# Patient Record
Sex: FEMALE | Race: WHITE | NOT HISPANIC OR LATINO | Employment: FULL TIME | URBAN - METROPOLITAN AREA
[De-identification: names, ages, dates, MRNs, and addresses within clinical notes are randomized per-mention and may not be internally consistent; named-entity substitution may affect disease eponyms.]

---

## 2018-11-15 ENCOUNTER — OFFICE VISIT (OUTPATIENT)
Dept: FAMILY MEDICINE CLINIC | Facility: CLINIC | Age: 58
End: 2018-11-15

## 2018-11-15 VITALS
SYSTOLIC BLOOD PRESSURE: 112 MMHG | WEIGHT: 176.4 LBS | BODY MASS INDEX: 32.46 KG/M2 | HEIGHT: 62 IN | TEMPERATURE: 97.8 F | OXYGEN SATURATION: 98 % | DIASTOLIC BLOOD PRESSURE: 78 MMHG | HEART RATE: 88 BPM

## 2018-11-15 DIAGNOSIS — H04.123 DRY EYES: ICD-10-CM

## 2018-11-15 DIAGNOSIS — K22.2 ESOPHAGEAL STRICTURE: ICD-10-CM

## 2018-11-15 DIAGNOSIS — K13.21 LEUKOPLAKIA OF TONGUE: Primary | ICD-10-CM

## 2018-11-15 DIAGNOSIS — R53.82 CHRONIC FATIGUE: ICD-10-CM

## 2018-11-15 DIAGNOSIS — R68.2 MOUTH DRYNESS: ICD-10-CM

## 2018-11-15 DIAGNOSIS — M25.50 ARTHRALGIA, UNSPECIFIED JOINT: ICD-10-CM

## 2018-11-15 DIAGNOSIS — H91.93 BILATERAL HEARING LOSS, UNSPECIFIED HEARING LOSS TYPE: ICD-10-CM

## 2018-11-15 DIAGNOSIS — H53.9 CHANGES IN VISION: ICD-10-CM
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
## 2018-11-15 DIAGNOSIS — R51.9 FREQUENT HEADACHES: ICD-10-CM

## 2018-11-15 DIAGNOSIS — M79.10 MYALGIA: ICD-10-CM

## 2018-11-15 DIAGNOSIS — R07.9 CHEST PAIN, UNSPECIFIED TYPE: ICD-10-CM

## 2018-11-15 PROCEDURE — 99204 OFFICE O/P NEW MOD 45 MIN: CPT | Performed by: NURSE PRACTITIONER

## 2018-11-15 NOTE — PROGRESS NOTES
Assessment/Plan:    No problem-specific Assessment & Plan notes found for this encounter  Diagnoses and all orders for this visit:    Leukoplakia of tongue  Comments:  DW patient that I am concerned with presence over a year need to see an ENT referral given   Orders:  -     Ambulatory Referral to Otolaryngology; Future    Bilateral hearing loss, unspecified hearing loss type  Comments:  ENT referral placed  Orders:  -     Ambulatory Referral to Otolaryngology; Future    Chronic fatigue  -     Lyme Antibody Profile with reflex to WB; Future  -     CBC and differential; Future  -     Comprehensive metabolic panel; Future  -     Lipid Panel with Direct LDL reflex; Future  -     TSH, 3rd generation with Free T4 reflex; Future    Myalgia  -     Lyme Antibody Profile with reflex to WB; Future  -     CBC and differential; Future  -     Comprehensive metabolic panel; Future  -     Lipid Panel with Direct LDL reflex; Future  -     TSH, 3rd generation with Free T4 reflex; Future    Arthralgia, unspecified joint  -     Lyme Antibody Profile with reflex to WB; Future  -     CBC and differential; Future  -     Comprehensive metabolic panel; Future  -     Lipid Panel with Direct LDL reflex; Future  -     TSH, 3rd generation with Free T4 reflex; Future    Frequent headaches  Comments:  prior neurology W/U 2 years ago for similar was told she had atypical migraines will eventually need neurology  Orders:  -     Lyme Antibody Profile with reflex to WB; Future  -     CBC and differential; Future  -     Comprehensive metabolic panel; Future  -     Lipid Panel with Direct LDL reflex; Future  -     TSH, 3rd generation with Free T4 reflex; Future    Changes in vision  -     Lyme Antibody Profile with reflex to WB; Future  -     CBC and differential; Future  -     Comprehensive metabolic panel; Future  -     Lipid Panel with Direct LDL reflex; Future  -     TSH, 3rd generation with Free T4 reflex;  Future    Esophageal stricture  Comments:  eventually will need to see GI for evaluation and possible EGD  Orders:  -     Lyme Antibody Profile with reflex to WB; Future  -     CBC and differential; Future  -     Comprehensive metabolic panel; Future  -     Lipid Panel with Direct LDL reflex; Future  -     TSH, 3rd generation with Free T4 reflex; Future    Dry eyes  -     Lyme Antibody Profile with reflex to WB; Future  -     CBC and differential; Future  -     Comprehensive metabolic panel; Future  -     Lipid Panel with Direct LDL reflex; Future  -     TSH, 3rd generation with Free T4 reflex; Future    Mouth dryness  Comments:  appearing to be possible autoimmune condition such as sjgrens   Orders:  -     Lyme Antibody Profile with reflex to WB; Future  -     CBC and differential; Future  -     Comprehensive metabolic panel; Future  -     Lipid Panel with Direct LDL reflex; Future  -     TSH, 3rd generation with Free T4 reflex; Future    Tick bite, initial encounter  Comments:  labs ordered  Orders:  -     Lyme Antibody Profile with reflex to WB; Future    Chest pain, unspecified type  Comments:  DW patient if symptoms return ED precautions           Subjective:      Patient ID: Chio Agustin is a 62 y o  female  Patient here today to establish care  Patient rpeorts that she is almost never sick  Patient reports that she feels that she has some autoimmune condition possible  Patient had seen rheumatology about 20 years ago and was told she may have had fibromyalgia and told she had rheumatoid arthritis and was not ever on any medications for this  Recently she is having multiple symptoms and feeling that all of her symptoms are on the right side of her body   Symptoms started about 3 months ago with an "ear Infection" on the right side of her head having a pain in the right ear and went to a walk in center and was told to take sudafed and was having ear blocked up and had diminished hearing and hearing is decreased and now has started on the other side as well  Patient then started with having right inside cheek mouth ulcers and something on her tongue and was told that her tongue was feeling too big and rubbing on things and having pain on the right side of her face  Patient is feeling a pain and swelling on the right side of her face  Patient at one point woke up with boils on her face and woke up at times with a rash red comes and goes right foot red rash and itchying  Pins and needles feeling on the right upper thigh comes and goes pins and needles an does have a history of Lumbar disc disease and joints are hurting elbows wrists hip right knees and attributable to weight per patient  Finger joint hurting at times and goes away  Patient also reports that she has a history of migraines her entire life and come and goes  Had a history of a tick bite several months ago present for two days  Patient reports that last week she was at work and reports that everything "went black" could not see anything but 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 and then vision came back slowly after about 30 seconds  Patient last eye exam was six months ago  Following that episode she felt like she couldn't talk and words were garbled  Patient had a neurology at [facility] and had a w/u in the past and had an MRI on the brain and CT scan and was done eight months after the prior episode which was 2 years ago  Patient has a constant headache and a pain in the left side of the head  Patient takes Excedrin migraine and does help  Father 80 with diabetes   Mom 80 with dementia aneurysm in brain and cardiomyopathy  The following portions of the patient's history were reviewed and updated as appropriate:   She  has no past medical history on file    She   Patient Active Problem List    Diagnosis Date Noted    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    Bilateral hearing loss 11/15/2018    Chronic fatigue 11/15/2018    Myalgia 11/15/2018    Arthralgia 11/15/2018  Changes in vision 11/15/2018    Frequent headaches 11/15/2018    Esophageal stricture 11/15/2018    Dry eyes 11/15/2018    Mouth dryness 11/15/2018    Tick bite 11/15/2018    Chest pain 11/15/2018     She  has no past surgical history on file  Her family history is not on file  She  reports that she has never smoked  She has never used smokeless tobacco  Her alcohol and drug histories are not on file  She is allergic to albuterol and penicillins       Review of Systems   Constitutional: Positive for fatigue  Negative for activity change, appetite change, chills, diaphoresis, fever and unexpected weight change  HENT: Positive for ear pain, hearing loss, mouth sores, tinnitus, trouble swallowing and voice change  Negative for congestion, dental problem, drooling, nosebleeds, postnasal drip, rhinorrhea, sinus pain, sinus pressure, sneezing and sore throat  Very dry mouth    Eyes: Negative for visual disturbance  Dry eyes   Respiratory: Positive for cough  Cardiovascular: Positive for chest pain  Had an episode of crushing chest pain last week lasting a few minutes not associated with eating a meal    Gastrointestinal: Negative for abdominal distention, abdominal pain, anal bleeding, blood in stool, constipation, diarrhea, nausea and vomiting  Esophageal stricture history with prior dilation r/t her GERD history now having problems with foods getting stuck in her throat    Endocrine: Positive for polydipsia, polyphagia and polyuria  Genitourinary:        Vaginal yeast infection had hysterectomy 11 years ago done for cevical precancer cells Patient has mammograms yearly last one 3 years ago have been ok   Musculoskeletal: Positive for arthralgias, back pain, joint swelling and myalgias  Skin: Positive for rash  Neurological: Positive for dizziness, numbness and headaches   Negative for tremors, seizures, syncope, facial asymmetry, speech difficulty, weakness and light-headedness  Right side of face and right leg at times goes numb    Hematological: Negative for adenopathy  Bruises/bleeds easily  Psychiatric/Behavioral: Positive for sleep disturbance  Falling asleep ok waking up at times and can't fall back to sleep happening more than not  Patient does snore no prior sleep study  Objective:      /78 (Cuff Size: Standard)   Pulse 88   Temp 97 8 °F (36 6 °C) (Tympanic)   Ht 5' 2" (1 575 m)   Wt 80 kg (176 lb 6 4 oz)   SpO2 98%   BMI 32 26 kg/m²          Physical Exam   Constitutional: She is oriented to person, place, and time  Vital signs are normal  She appears well-developed and well-nourished  She is cooperative  No distress  HENT:   Head: Normocephalic  Right Ear: Tympanic membrane, external ear and ear canal normal  Decreased hearing is noted  Left Ear: Tympanic membrane, external ear and ear canal normal  Decreased hearing is noted  Mouth/Throat: Uvula is midline, oropharynx is clear and moist and mucous membranes are normal  Oral lesions present  Neck: Trachea normal and normal range of motion  Normal carotid pulses present  Spinous process tenderness and muscular tenderness present  Carotid bruit is not present  No thyroid mass and no thyromegaly present  Cardiovascular: Normal rate, regular rhythm, S1 normal, S2 normal and normal heart sounds  Pulmonary/Chest: Effort normal and breath sounds normal    Abdominal: Soft  Normal appearance and bowel sounds are normal  She exhibits distension  Musculoskeletal:        Cervical back: She exhibits tenderness and spasm  Lymphadenopathy:     She has cervical adenopathy  Right cervical: Superficial cervical adenopathy present  Neurological: She is alert and oriented to person, place, and time  She has normal strength  GCS eye subscore is 4  GCS verbal subscore is 5  GCS motor subscore is 6  Skin: Skin is warm and dry  She is not diaphoretic  Psychiatric: She has a normal mood and affect  Her speech is normal and behavior is normal  Judgment and thought content normal  Cognition and memory are normal    Nursing note and vitals reviewed

## 2018-11-24 ENCOUNTER — APPOINTMENT (OUTPATIENT)
Dept: LAB | Facility: CLINIC | Age: 58
End: 2018-11-24
Payer: COMMERCIAL

## 2018-11-24 DIAGNOSIS — R51.9 FREQUENT HEADACHES: ICD-10-CM

## 2018-11-24 DIAGNOSIS — M25.50 ARTHRALGIA, UNSPECIFIED JOINT: ICD-10-CM

## 2018-11-24 DIAGNOSIS — R53.82 CHRONIC FATIGUE: ICD-10-CM

## 2018-11-24 DIAGNOSIS — R68.2 MOUTH DRYNESS: ICD-10-CM

## 2018-11-24 DIAGNOSIS — K22.2 ESOPHAGEAL STRICTURE: ICD-10-CM
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
## 2018-11-24 DIAGNOSIS — H53.9 CHANGES IN VISION: ICD-10-CM

## 2018-11-24 DIAGNOSIS — M79.10 MYALGIA: ICD-10-CM

## 2018-11-24 DIAGNOSIS — H04.123 DRY EYES: ICD-10-CM

## 2018-11-24 LAB
ALBUMIN SERPL BCP-MCNC: 3.5 G/DL (ref 3.5–5)
ALP SERPL-CCNC: 88 U/L (ref 46–116)
ALT SERPL W P-5'-P-CCNC: 31 U/L (ref 12–78)
ANION GAP SERPL CALCULATED.3IONS-SCNC: 5 MMOL/L (ref 4–13)
AST SERPL W P-5'-P-CCNC: 23 U/L (ref 5–45)
BASOPHILS # BLD AUTO: 0.03 THOUSANDS/ΜL (ref 0–0.1)
BASOPHILS NFR BLD AUTO: 1 % (ref 0–1)
BILIRUB SERPL-MCNC: 0.31 MG/DL (ref 0.2–1)
BUN SERPL-MCNC: 18 MG/DL (ref 5–25)
CALCIUM SERPL-MCNC: 9.7 MG/DL (ref 8.3–10.1)
CHLORIDE SERPL-SCNC: 106 MMOL/L (ref 100–108)
CHOLEST SERPL-MCNC: 238 MG/DL (ref 50–200)
CO2 SERPL-SCNC: 26 MMOL/L (ref 21–32)
CREAT SERPL-MCNC: 0.85 MG/DL (ref 0.6–1.3)
EOSINOPHIL # BLD AUTO: 0.14 THOUSAND/ΜL (ref 0–0.61)
EOSINOPHIL NFR BLD AUTO: 2 % (ref 0–6)
ERYTHROCYTE [DISTWIDTH] IN BLOOD BY AUTOMATED COUNT: 14.6 % (ref 11.6–15.1)
GFR SERPL CREATININE-BSD FRML MDRD: 76 ML/MIN/1.73SQ M
GLUCOSE P FAST SERPL-MCNC: 100 MG/DL (ref 65–99)
HCT VFR BLD AUTO: 43.5 % (ref 34.8–46.1)
HDLC SERPL-MCNC: 56 MG/DL (ref 40–60)
HGB BLD-MCNC: 13.9 G/DL (ref 11.5–15.4)
IMM GRANULOCYTES # BLD AUTO: 0.01 THOUSAND/UL (ref 0–0.2)
IMM GRANULOCYTES NFR BLD AUTO: 0 % (ref 0–2)
LDLC SERPL CALC-MCNC: 157 MG/DL (ref 0–100)
LYMPHOCYTES # BLD AUTO: 1.99 THOUSANDS/ΜL (ref 0.6–4.47)
LYMPHOCYTES NFR BLD AUTO: 30 % (ref 14–44)
MCH RBC QN AUTO: 28.7 PG (ref 26.8–34.3)
MCHC RBC AUTO-ENTMCNC: 32 G/DL (ref 31.4–37.4)
MCV RBC AUTO: 90 FL (ref 82–98)
MONOCYTES # BLD AUTO: 0.43 THOUSAND/ΜL (ref 0.17–1.22)
MONOCYTES NFR BLD AUTO: 7 % (ref 4–12)
NEUTROPHILS # BLD AUTO: 4.03 THOUSANDS/ΜL (ref 1.85–7.62)
NEUTS SEG NFR BLD AUTO: 60 % (ref 43–75)
NRBC BLD AUTO-RTO: 0 /100 WBCS
PLATELET # BLD AUTO: 312 THOUSANDS/UL (ref 149–390)
PMV BLD AUTO: 10.5 FL (ref 8.9–12.7)
POTASSIUM SERPL-SCNC: 4.1 MMOL/L (ref 3.5–5.3)
PROT SERPL-MCNC: 7.3 G/DL (ref 6.4–8.2)
RBC # BLD AUTO: 4.85 MILLION/UL (ref 3.81–5.12)
SODIUM SERPL-SCNC: 137 MMOL/L (ref 136–145)
TRIGL SERPL-MCNC: 127 MG/DL
TSH SERPL DL<=0.05 MIU/L-ACNC: 2.17 UIU/ML (ref 0.36–3.74)
WBC # BLD AUTO: 6.63 THOUSAND/UL (ref 4.31–10.16)

## 2018-11-24 PROCEDURE — 85025 COMPLETE CBC W/AUTO DIFF WBC: CPT

## 2018-11-24 PROCEDURE — 86618 LYME DISEASE ANTIBODY: CPT

## 2018-11-24 PROCEDURE — 80061 LIPID PANEL: CPT

## 2018-11-24 PROCEDURE — 80053 COMPREHEN METABOLIC PANEL: CPT

## 2018-11-24 PROCEDURE — 36415 COLL VENOUS BLD VENIPUNCTURE: CPT

## 2018-11-24 PROCEDURE — 84443 ASSAY THYROID STIM HORMONE: CPT

## 2018-11-26 LAB
B BURGDOR IGG SER IA-ACNC: 0.32
B BURGDOR IGM SER IA-ACNC: 0.4

## 2018-11-29 ENCOUNTER — LAB REQUISITION (OUTPATIENT)
Dept: LAB | Facility: HOSPITAL | Age: 58
End: 2018-11-29

## 2018-11-29 DIAGNOSIS — D37.02 NEOPLASM OF UNCERTAIN BEHAVIOR OF TONGUE: ICD-10-CM

## 2018-11-29 PROCEDURE — 88309 TISSUE EXAM BY PATHOLOGIST: CPT | Performed by: PATHOLOGY

## 2018-11-30 DIAGNOSIS — M25.50 ARTHRALGIA, UNSPECIFIED JOINT: ICD-10-CM

## 2018-11-30 DIAGNOSIS — R53.82 CHRONIC FATIGUE: Primary | ICD-10-CM

## 2018-11-30 DIAGNOSIS — M79.10 MYALGIA: ICD-10-CM

## 2018-12-04 ENCOUNTER — TELEPHONE (OUTPATIENT)
Dept: NEUROLOGY | Facility: CLINIC | Age: 58
End: 2018-12-04

## 2018-12-05 ENCOUNTER — TRANSCRIBE ORDERS (OUTPATIENT)
Dept: ADMINISTRATIVE | Facility: HOSPITAL | Age: 58
End: 2018-12-05

## 2018-12-05 DIAGNOSIS — C02.2 MALIGNANT NEOPLASM OF VENTRAL SURFACE OF TONGUE (HCC): Primary | ICD-10-CM

## 2018-12-13 ENCOUNTER — HOSPITAL ENCOUNTER (OUTPATIENT)
Dept: RADIOLOGY | Age: 58
Discharge: HOME | End: 2018-12-13
Payer: COMMERCIAL

## 2018-12-13 DIAGNOSIS — C02.2 MALIGNANT NEOPLASM OF VENTRAL SURFACE OF TONGUE (HCC): ICD-10-CM

## 2018-12-13 LAB — GLUCOSE SERPL-MCNC: 99 MG/DL (ref 65–140)

## 2018-12-13 PROCEDURE — 82948 REAGENT STRIP/BLOOD GLUCOSE: CPT

## 2018-12-13 PROCEDURE — A9552 F18 FDG: HCPCS

## 2018-12-13 PROCEDURE — 70491 CT SOFT TISSUE NECK W/DYE: CPT

## 2018-12-13 PROCEDURE — 78815 PET IMAGE W/CT SKULL-THIGH: CPT

## 2018-12-13 RX ORDER — LORAZEPAM 2 MG/ML
1 INJECTION INTRAMUSCULAR
Status: COMPLETED | OUTPATIENT
Start: 2018-12-13 | End: 2018-12-13

## 2018-12-13 RX ADMIN — IOHEXOL 85 ML: 350 INJECTION, SOLUTION INTRAVENOUS at 10:25

## 2018-12-13 RX ADMIN — LORAZEPAM 1 MG: 2 INJECTION INTRAMUSCULAR; INTRAVENOUS at 08:05

## 2018-12-22 ENCOUNTER — TRANSCRIBE ORDERS (OUTPATIENT)
Dept: ADMINISTRATIVE | Facility: HOSPITAL | Age: 58
End: 2018-12-22

## 2018-12-22 ENCOUNTER — APPOINTMENT (OUTPATIENT)
Dept: LAB | Facility: CLINIC | Age: 58
End: 2018-12-22
Payer: COMMERCIAL

## 2018-12-22 DIAGNOSIS — C02.2 MALIGNANT NEOPLASM OF VENTRAL SURFACE OF TONGUE (HCC): ICD-10-CM

## 2018-12-22 DIAGNOSIS — D68.8 FAMILIAL MULTIPLE FACTOR DEFICIENCY SYNDROME (HCC): ICD-10-CM

## 2018-12-22 DIAGNOSIS — D68.8 FAMILIAL MULTIPLE FACTOR DEFICIENCY SYNDROME (HCC): Primary | ICD-10-CM

## 2018-12-22 LAB
ANION GAP SERPL CALCULATED.3IONS-SCNC: 6 MMOL/L (ref 4–13)
APTT PPP: 32 SECONDS (ref 26–38)
BASOPHILS # BLD AUTO: 0.03 THOUSANDS/ΜL (ref 0–0.1)
BASOPHILS NFR BLD AUTO: 1 % (ref 0–1)
BUN SERPL-MCNC: 14 MG/DL (ref 5–25)
CALCIUM SERPL-MCNC: 9.1 MG/DL (ref 8.3–10.1)
CHLORIDE SERPL-SCNC: 108 MMOL/L (ref 100–108)
CO2 SERPL-SCNC: 27 MMOL/L (ref 21–32)
CREAT SERPL-MCNC: 0.82 MG/DL (ref 0.6–1.3)
EOSINOPHIL # BLD AUTO: 0.1 THOUSAND/ΜL (ref 0–0.61)
EOSINOPHIL NFR BLD AUTO: 2 % (ref 0–6)
ERYTHROCYTE [DISTWIDTH] IN BLOOD BY AUTOMATED COUNT: 14.5 % (ref 11.6–15.1)
GFR SERPL CREATININE-BSD FRML MDRD: 79 ML/MIN/1.73SQ M
GLUCOSE P FAST SERPL-MCNC: 103 MG/DL (ref 65–99)
HCT VFR BLD AUTO: 44 % (ref 34.8–46.1)
HGB BLD-MCNC: 14.4 G/DL (ref 11.5–15.4)
IMM GRANULOCYTES # BLD AUTO: 0 THOUSAND/UL (ref 0–0.2)
IMM GRANULOCYTES NFR BLD AUTO: 0 % (ref 0–2)
INR PPP: 0.95 (ref 0.86–1.17)
LYMPHOCYTES # BLD AUTO: 1.65 THOUSANDS/ΜL (ref 0.6–4.47)
LYMPHOCYTES NFR BLD AUTO: 35 % (ref 14–44)
MCH RBC QN AUTO: 29 PG (ref 26.8–34.3)
MCHC RBC AUTO-ENTMCNC: 32.7 G/DL (ref 31.4–37.4)
MCV RBC AUTO: 89 FL (ref 82–98)
MONOCYTES # BLD AUTO: 0.35 THOUSAND/ΜL (ref 0.17–1.22)
MONOCYTES NFR BLD AUTO: 7 % (ref 4–12)
NEUTROPHILS # BLD AUTO: 2.65 THOUSANDS/ΜL (ref 1.85–7.62)
NEUTS SEG NFR BLD AUTO: 55 % (ref 43–75)
NRBC BLD AUTO-RTO: 0 /100 WBCS
PLATELET # BLD AUTO: 278 THOUSANDS/UL (ref 149–390)
PMV BLD AUTO: 11.3 FL (ref 8.9–12.7)
POTASSIUM SERPL-SCNC: 4.1 MMOL/L (ref 3.5–5.3)
PROTHROMBIN TIME: 12.8 SECONDS (ref 11.8–14.2)
RBC # BLD AUTO: 4.97 MILLION/UL (ref 3.81–5.12)
SODIUM SERPL-SCNC: 141 MMOL/L (ref 136–145)
WBC # BLD AUTO: 4.78 THOUSAND/UL (ref 4.31–10.16)

## 2018-12-22 PROCEDURE — 80048 BASIC METABOLIC PNL TOTAL CA: CPT

## 2018-12-22 PROCEDURE — 36415 COLL VENOUS BLD VENIPUNCTURE: CPT

## 2018-12-22 PROCEDURE — 85025 COMPLETE CBC W/AUTO DIFF WBC: CPT

## 2018-12-22 PROCEDURE — 85610 PROTHROMBIN TIME: CPT

## 2018-12-22 PROCEDURE — 85730 THROMBOPLASTIN TIME PARTIAL: CPT

## 2018-12-27 ENCOUNTER — OFFICE VISIT (OUTPATIENT)
Dept: FAMILY MEDICINE CLINIC | Facility: CLINIC | Age: 58
End: 2018-12-27

## 2018-12-27 VITALS
HEART RATE: 74 BPM | DIASTOLIC BLOOD PRESSURE: 76 MMHG | BODY MASS INDEX: 30.99 KG/M2 | TEMPERATURE: 97.5 F | OXYGEN SATURATION: 99 % | HEIGHT: 62 IN | WEIGHT: 168.4 LBS | SYSTOLIC BLOOD PRESSURE: 120 MMHG

## 2018-12-27 DIAGNOSIS — D00.07 SQUAMOUS CELL CARCINOMA IN SITU OF LATERAL PORTION OF TONGUE: ICD-10-CM

## 2018-12-27 DIAGNOSIS — Z01.818 PRE-OP EXAMINATION: Primary | ICD-10-CM

## 2018-12-27 PROCEDURE — 93000 ELECTROCARDIOGRAM COMPLETE: CPT | Performed by: FAMILY MEDICINE

## 2018-12-27 PROCEDURE — 99213 OFFICE O/P EST LOW 20 MIN: CPT | Performed by: FAMILY MEDICINE

## 2018-12-27 NOTE — PROGRESS NOTES
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  She is cleared for surgery  Follow up in 1 month for a wellness exam        Subjective:      Patient ID: Sheridan Odell is a 62 y o  female  Patient is here for a Pre-op clearance of removal of squamous cell carcinoma cancer of the right lateral portion of the tongue  The surgery is to be done on January 9th by Dr Juanjo Brewster at [address] [place] [address]  She was never a smoker never chewed tobacco          The following portions of the patient's history were reviewed and updated as appropriate:   She  has no past medical history on file  She   Patient Active Problem List    Diagnosis Date Noted    Pre-op examination 12/27/2018    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 12/27/2018    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    Bilateral hearing loss 11/15/2018    Chronic fatigue 11/15/2018    Myalgia 11/15/2018    Arthralgia 11/15/2018    Changes in vision 11/15/2018    Frequent headaches 11/15/2018    Esophageal stricture 11/15/2018    Dry eyes 11/15/2018    Mouth dryness 11/15/2018    Tick bite 11/15/2018    Chest pain 11/15/2018     She  has no past surgical history on file  Her family history is not on file  She  reports that she has never smoked  She has never used smokeless tobacco  Her alcohol and drug histories are not on file  No current outpatient prescriptions on file  No current facility-administered medications for this visit  No current outpatient prescriptions on file prior to visit  No current facility-administered medications on file prior to visit  She is allergic to bee venom; albuterol; and penicillins       Review of Systems   Constitutional: Negative for activity change, appetite change, fatigue and fever  HENT: Negative for congestion and ear discharge  Respiratory: Negative for cough and shortness of breath  Cardiovascular: Negative for chest pain and palpitations  Gastrointestinal: Negative for diarrhea and nausea  Musculoskeletal: Negative for arthralgias and back pain  Skin: Positive for color change and wound  Negative for rash  Neurological: Negative for dizziness and headaches  Psychiatric/Behavioral: Negative for agitation and behavioral problems  Objective:      /76   Pulse 74   Temp 97 5 °F (36 4 °C)   Ht 5' 2" (1 575 m)   Wt 76 4 kg (168 lb 6 4 oz)   SpO2 99%   BMI 30 80 kg/m²          Physical Exam   Constitutional: She is oriented to person, place, and time  She appears well-developed and well-nourished  No distress  HENT:   Head: Normocephalic and atraumatic  Nose: Nose normal    Mouth/Throat: Oropharynx is clear and moist    A well circumscribed white plaque noted on the right side of her tongue  Eyes: Pupils are equal, round, and reactive to light  Conjunctivae are normal    Cardiovascular: Normal rate, regular rhythm and normal heart sounds  No murmur heard  Pulmonary/Chest: Effort normal and breath sounds normal  No respiratory distress  She has no wheezes  Abdominal: Soft  Bowel sounds are normal  She exhibits no distension  There is no tenderness  Neurological: She is alert and oriented to person, place, and time  Skin: Skin is warm and dry  No rash noted  She is not diaphoretic  No erythema  Psychiatric: She has a normal mood and affect

## 2018-12-31 NOTE — PRE-PROCEDURE INSTRUCTIONS
No outpatient prescriptions have been marked as taking for the 1/9/19 encounter [facility] Encounter)  Instructed has no medications to be taken morning of surgery  Stop NSAIDs, aspirin, vitamins, and supplements 1 week before surgery  Instructed re chlorhexidine showers per hospital policy

## 2019-01-08 ENCOUNTER — ANESTHESIA EVENT (OUTPATIENT)
Dept: PERIOP | Facility: HOSPITAL | Age: 59
End: 2019-01-08

## 2019-01-09 ENCOUNTER — ANESTHESIA (OUTPATIENT)
Dept: PERIOP | Facility: HOSPITAL | Age: 59
End: 2019-01-09

## 2019-01-09 ENCOUNTER — HOSPITAL ENCOUNTER (OUTPATIENT)
Facility: HOSPITAL | Age: 59
Setting detail: OUTPATIENT SURGERY
Discharge: HOME | End: 2019-01-09
Attending: OTOLARYNGOLOGY | Admitting: OTOLARYNGOLOGY

## 2019-01-09 VITALS
TEMPERATURE: 97.5 F | BODY MASS INDEX: 30.91 KG/M2 | WEIGHT: 168 LBS | HEIGHT: 62 IN | DIASTOLIC BLOOD PRESSURE: 70 MMHG | HEART RATE: 67 BPM | RESPIRATION RATE: 16 BRPM | OXYGEN SATURATION: 98 % | SYSTOLIC BLOOD PRESSURE: 133 MMHG

## 2019-01-09 DIAGNOSIS — C02.2 MALIGNANT NEOPLASM OF VENTRAL SURFACE OF TONGUE (HCC): ICD-10-CM

## 2019-01-09 PROCEDURE — 88309 TISSUE EXAM BY PATHOLOGIST: CPT | Performed by: PATHOLOGY

## 2019-01-09 PROCEDURE — 88331 PATH CONSLTJ SURG 1 BLK 1SPC: CPT | Performed by: PATHOLOGY

## 2019-01-09 RX ORDER — DEXAMETHASONE SODIUM PHOSPHATE 4 MG/ML
INJECTION, SOLUTION INTRA-ARTICULAR; INTRALESIONAL; INTRAMUSCULAR; INTRAVENOUS; SOFT TISSUE AS NEEDED
Status: DISCONTINUED | OUTPATIENT
Start: 2019-01-09 | End: 2019-01-09 | Stop reason: SURG

## 2019-01-09 RX ORDER — ONDANSETRON 2 MG/ML
4 INJECTION INTRAMUSCULAR; INTRAVENOUS ONCE AS NEEDED
Status: DISCONTINUED | OUTPATIENT
Start: 2019-01-09 | End: 2019-01-09 | Stop reason: HOSPADM

## 2019-01-09 RX ORDER — ACETAMINOPHEN 325 MG/1
650 TABLET ORAL EVERY 4 HOURS PRN
Status: DISCONTINUED | OUTPATIENT
Start: 2019-01-09 | End: 2019-01-09 | Stop reason: HOSPADM

## 2019-01-09 RX ORDER — MIDAZOLAM HYDROCHLORIDE 1 MG/ML
INJECTION INTRAMUSCULAR; INTRAVENOUS AS NEEDED
Status: DISCONTINUED | OUTPATIENT
Start: 2019-01-09 | End: 2019-01-09 | Stop reason: SURG

## 2019-01-09 RX ORDER — NEOSTIGMINE METHYLSULFATE 1 MG/ML
INJECTION INTRAVENOUS AS NEEDED
Status: DISCONTINUED | OUTPATIENT
Start: 2019-01-09 | End: 2019-01-09 | Stop reason: SURG

## 2019-01-09 RX ORDER — DEXTROSE MONOHYDRATE AND SODIUM CHLORIDE 5; .45 G/100ML; G/100ML
125 INJECTION, SOLUTION INTRAVENOUS CONTINUOUS
Status: DISCONTINUED | OUTPATIENT
Start: 2019-01-09 | End: 2019-01-09 | Stop reason: HOSPADM

## 2019-01-09 RX ORDER — FENTANYL CITRATE 50 UG/ML
INJECTION, SOLUTION INTRAMUSCULAR; INTRAVENOUS AS NEEDED
Status: DISCONTINUED | OUTPATIENT
Start: 2019-01-09 | End: 2019-01-09 | Stop reason: SURG

## 2019-01-09 RX ORDER — CLINDAMYCIN PHOSPHATE 900 MG/50ML
900 INJECTION INTRAVENOUS ONCE
Status: COMPLETED | OUTPATIENT
Start: 2019-01-09 | End: 2019-01-09

## 2019-01-09 RX ORDER — LIDOCAINE HYDROCHLORIDE AND EPINEPHRINE 10; 10 MG/ML; UG/ML
INJECTION, SOLUTION INFILTRATION; PERINEURAL AS NEEDED
Status: DISCONTINUED | OUTPATIENT
Start: 2019-01-09 | End: 2019-01-09 | Stop reason: HOSPADM

## 2019-01-09 RX ORDER — SODIUM CHLORIDE 9 MG/ML
125 INJECTION, SOLUTION INTRAVENOUS CONTINUOUS
Status: DISCONTINUED | OUTPATIENT
Start: 2019-01-09 | End: 2019-01-09 | Stop reason: HOSPADM

## 2019-01-09 RX ORDER — FENTANYL CITRATE/PF 50 MCG/ML
50 SYRINGE (ML) INJECTION
Status: DISCONTINUED | OUTPATIENT
Start: 2019-01-09 | End: 2019-01-09 | Stop reason: HOSPADM

## 2019-01-09 RX ORDER — OXYMETAZOLINE HYDROCHLORIDE 0.05 G/100ML
SPRAY NASAL AS NEEDED
Status: DISCONTINUED | OUTPATIENT
Start: 2019-01-09 | End: 2019-01-09 | Stop reason: SURG

## 2019-01-09 RX ORDER — ONDANSETRON 2 MG/ML
INJECTION INTRAMUSCULAR; INTRAVENOUS AS NEEDED
Status: DISCONTINUED | OUTPATIENT
Start: 2019-01-09 | End: 2019-01-09 | Stop reason: SURG

## 2019-01-09 RX ORDER — PROPOFOL 10 MG/ML
INJECTION, EMULSION INTRAVENOUS AS NEEDED
Status: DISCONTINUED | OUTPATIENT
Start: 2019-01-09 | End: 2019-01-09 | Stop reason: SURG

## 2019-01-09 RX ORDER — OXYCODONE HYDROCHLORIDE AND ACETAMINOPHEN 5; 325 MG/1; MG/1
2 TABLET ORAL EVERY 4 HOURS PRN
Status: DISCONTINUED | OUTPATIENT
Start: 2019-01-09 | End: 2019-01-09 | Stop reason: HOSPADM

## 2019-01-09 RX ORDER — ONDANSETRON 2 MG/ML
4 INJECTION INTRAMUSCULAR; INTRAVENOUS EVERY 6 HOURS PRN
Status: DISCONTINUED | OUTPATIENT
Start: 2019-01-09 | End: 2019-01-09 | Stop reason: HOSPADM

## 2019-01-09 RX ORDER — ROCURONIUM BROMIDE 10 MG/ML
INJECTION, SOLUTION INTRAVENOUS AS NEEDED
Status: DISCONTINUED | OUTPATIENT
Start: 2019-01-09 | End: 2019-01-09 | Stop reason: SURG

## 2019-01-09 RX ORDER — MAGNESIUM HYDROXIDE 1200 MG/15ML
LIQUID ORAL AS NEEDED
Status: DISCONTINUED | OUTPATIENT
Start: 2019-01-09 | End: 2019-01-09 | Stop reason: HOSPADM

## 2019-01-09 RX ORDER — GLYCOPYRROLATE 0.2 MG/ML
INJECTION INTRAMUSCULAR; INTRAVENOUS AS NEEDED
Status: DISCONTINUED | OUTPATIENT
Start: 2019-01-09 | End: 2019-01-09 | Stop reason: SURG

## 2019-01-09 RX ADMIN — LIDOCAINE HYDROCHLORIDE 50 MG: 20 INJECTION, SOLUTION INTRAVENOUS at 09:27

## 2019-01-09 RX ADMIN — SODIUM CHLORIDE 125 ML/HR: 0.9 INJECTION, SOLUTION INTRAVENOUS at 07:38

## 2019-01-09 RX ADMIN — OXYMETAZOLINE HYDROCHLORIDE 2 SPRAY: 5 SPRAY NASAL at 09:26

## 2019-01-09 RX ADMIN — GLYCOPYRROLATE 0.4 MG: 0.2 INJECTION, SOLUTION INTRAMUSCULAR; INTRAVENOUS at 10:48

## 2019-01-09 RX ADMIN — ROCURONIUM BROMIDE 35 MG: 10 INJECTION INTRAVENOUS at 09:28

## 2019-01-09 RX ADMIN — NEOSTIGMINE METHYLSULFATE 3 MG: 1 INJECTION INTRAVENOUS at 10:48

## 2019-01-09 RX ADMIN — MIDAZOLAM 2 MG: 1 INJECTION INTRAMUSCULAR; INTRAVENOUS at 09:17

## 2019-01-09 RX ADMIN — SODIUM CHLORIDE: 0.9 INJECTION, SOLUTION INTRAVENOUS at 10:40

## 2019-01-09 RX ADMIN — FENTANYL CITRATE 150 MCG: 50 INJECTION, SOLUTION INTRAMUSCULAR; INTRAVENOUS at 09:27

## 2019-01-09 RX ADMIN — CLINDAMYCIN PHOSPHATE 900 MG: 18 INJECTION, SOLUTION INTRAMUSCULAR; INTRAVENOUS at 09:30

## 2019-01-09 RX ADMIN — ONDANSETRON 4 MG: 2 INJECTION INTRAMUSCULAR; INTRAVENOUS at 10:47

## 2019-01-09 RX ADMIN — DEXAMETHASONE SODIUM PHOSPHATE 12 MG: 4 INJECTION, SOLUTION INTRAMUSCULAR; INTRAVENOUS at 09:40

## 2019-01-09 RX ADMIN — FENTANYL CITRATE 50 MCG: 50 INJECTION, SOLUTION INTRAMUSCULAR; INTRAVENOUS at 11:26

## 2019-01-09 RX ADMIN — PROPOFOL 160 MG: 10 INJECTION, EMULSION INTRAVENOUS at 09:27

## 2019-01-09 NOTE — ANESTHESIA PREPROCEDURE EVALUATION
Review of Systems/Medical History  Patient summary reviewed  Chart reviewed  No history of anesthetic complications     Cardiovascular  Negative cardio ROS    Pulmonary  Negative pulmonary ROS        GI/Hepatic  Negative GI/hepatic ROS          Kidney stones,        Endo/Other  Negative endo/other ROS      GYN  Negative gynecology ROS          Hematology  Negative hematology ROS      Musculoskeletal  Negative musculoskeletal ROS        Neurology    Headaches,    Psychology   Negative psychology ROS              Physical Exam    Airway    Mallampati score: III  TM Distance: >3 FB  Neck ROM: full     Dental   No notable dental hx     Cardiovascular  Comment: Negative ROS, Rhythm: regular, Rate: normal, Cardiovascular exam normal    Pulmonary  Pulmonary exam normal     Other Findings        Anesthesia Plan  ASA Score- 2     Anesthesia Type- general with ASA Monitors  Additional Monitors:   Airway Plan: ETT  Plan Factors-Patient not instructed to abstain from smoking on day of procedure  Patient did not smoke on day of surgery  Induction- intravenous  Postoperative Plan-     Informed Consent- Anesthetic plan and risks discussed with spouse and patient

## 2019-01-09 NOTE — OP NOTE
OPERATIVE REPORT  PATIENT NAME: Jolie Rincon    :  1960  MRN: [id number]  Pt Location: [facility] OR ROOM 04    SURGERY DATE: 2019    Surgeon(s) and Role:     Alysha Mancilla DO - Primary    Preop Diagnosis:  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 ([facility] [address] ) [C02 2]    Post-Op Diagnosis Codes:     * 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 (HCC) [C02 2]    Procedure(s) (LRB):  Partial glossectomy with FROZEN SECTION (N/A)    Specimen(s):  ID Type Source Tests Collected by Time Destination   1 : Ventral Tongue Right Dorsal margin 1200 Tissue Tongue TISSUE EXAM Clinton Thomas DO 2019 0943        Estimated Blood Loss:   Minimal    Drains:       Anesthesia Type:   General    Operative Indications:  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 ([facility] [address] ) [C02 2]      Operative Findings:  As above  Complications:   None    Procedure and Technique:  After the patient was properly identified she was placed on the operating table in the usual supine position  Informed time-out was completed noting the patient's date of birth, her drug allergies as well as the appropriate preop antibiotic which was given within 1 hr start time  The procedure was stated by the operative surgeon confirmed by Nursing and anesthesia with no contraindications to proceeding with surgery  General nasotracheal intubation was performed without difficulty  When deemed adequate patient was prepped and draped in the usual sterile fashion  A mouth gag was placed to keep the mouth open  A total of 4 mL of 1% lidocaine 1 to [id number] epinephrine was injected into the tongue  Small ulcer on the ventral surface of the right lateral tongue was noted  Using a marking pen, at least 1 cm anterior and inferiorly as well as 2 cm posteriorly was marked on the ventral tongue and carried along the dorsal surface of the tongue circumferentially around the lesion    A 15 blade scalpel was used to incise the lesion following markings and then cut through the intrinsic muscles of the tongue deep to the tumor  The specimen was passed off the table and marked anatomically with a 2 0 silk suture marking the 12 o'clock position  The specimen was placed and specimen container with and stapled at 3 6 and 9 o'clock to help pathology with orientation of the specimen  The specimen was sent to pathology and I personally spoke with the pathologist to make sure all the orientation of the specimen was clear  Mild bleeding at the surgical site was controlled with electrocautery  A wet dressing was placed over the wound as we waited for pathologic diagnosis  Pathology confirmed that all margins were free of invasive squamous cell carcinoma  Initially surgical closure was planned with a biological dressing; this was not required I was able to close the wound primarily using 3 0 Vicryl simple interrupted sutures  The wound was inspected and noted to be hemostatic  The oropharynx was suctioned and there was no active bleeding noted within the oral cavity or on the tongue proper  General nasotracheal intubation was discontinued, the nasotracheal tube was removed without difficulty and the patient was returned to the recovery room stable condition     I was present for the entire procedure    Patient Disposition:  PACU     SIGNATURE: Jarrod Powell DO  DATE: January 9, 2019  TIME: 11:05 AM

## 2019-01-09 NOTE — DISCHARGE INSTRUCTIONS
[facility]    POST OPERATIVE EXCISION INSTRUCTIONS        1  Keep area of incision clean and dry  2   You may shower ad kera after surgery  No strenuous activity  3   No smoking  Avoid second hand smoke exposure  4   May eat and drink as tolerated  Avoid spicy, acidic foods  Avoid mouthwash or drinks containing alcohol  Your speech may be slurred during healing process; do not be concerned, speech will return to normal as you heal  Do not be concerned if unable to eat solids or pureed foods but you must drink adequately to avoid dehydration  If unable to drink adequately contact our office; we will guide you and set up IV fluid hydration if necessary  5    If you are short of breath call 911 immediately or go to the nearest emergency room  6    A graft was not required to close surgical site on the tongue  You may rinse mouth with warm water and peroxide (50/50) or warm water alone before and after meals  4   Call office at [phone] or [phone] for any of the following:  Fever greater than 101°F, redness or warmth of surgical area, acute swelling of surgical area, discharge from surgical area

## 2019-02-06 NOTE — PROGRESS NOTES
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 in 2019 referred here for evaluation of headache  Neurologic assessment reveals normal neurological exam     Migraine with aura  Dr Mikel Bingham reports she has had migraines since age 9 and they have changed over her lifetime  She reports she will have long periods of time where she does not have migraines, she can go months or up to a year without one, but typically has 12-15 a year  In the last 2 years she has some new features to her Migraines with aura:  - visual auras in the past where her vision would tunnel down and worked its way back up, but in 2018 she had complete loss of vision for about 30 sec followed by slowly coming back in reversed tunnel vision  She also had vertical diplopia with this episode  - typical sensory aura with right hand and arm numbness with this episode  - language auras which started about 10 years ago, where her speech is not clear, words come out in the wrong order, which also happened with this most recent episode  - no history of hemiplegic weakness, however does feel generally "weak" all over/fatigue type - not true weakness  - over the last few years, she has had more of these auras without subsequent migraine, but she also admits that she often goes to bed and sleeps     Workup:  - she reports in  she had a workup including MRI brain, carotid ultrasound, TTE that she thinks were unremarkable    However, it certainly sounds like she has had new symptoms since then  - I discussed that I recommended further evaluation for alternative cause besides migraine with aura, although the description does sound more like migraine aura than stroke, stroke evaluation is indicated in this case  - recommended MRI brain with without contrast, TTE and carotid US ordered    Preventative:  - I recommended starting aspirin 81 mg daily which she plans to buy over-the-counter, recommended optimizing lipid control/as last LDL was 157 11/2018, she reports she has made multiple lifestyle changes since then and believes it will be much lower at recheck, recommended healthy diet and exercise  - due to low frequency of her migraines, she would not like to start a prescription daily preventative at this time  - I recommended considering at least daily headache preventative supplements including riboflavin and butterbur (magnesium caused diarrhea for her in the past)  - discussed precaution with aggressive chiropractic neck adjustments due to stroke risk    Abortive:  - okay to take Excedrin in limited fashion, however recommended avoiding triptans      * I recommended that she go to the emergency department for further evaluation next time this happens  Patient instructions      Additional Testing:   Neurodiagnostic workup:  Stroke work up to ensure nothing more than migraine with visual, language, basilar aura     I would recommend aspirin 81 mg daily until we evaluate for stroke risk   And discuss with your primary doctor the high cholesterol    Headache/migraine treatment:   - When you have a moderate to severe headache, you should seek rest, initiate relaxation and apply cold compresses to the head  Abortive medications (for immediate treatment of a headache): It is ok to take ibuprofen, acetaminophen or naproxen (Advil, Tylenol,  Aleve, Excedrin) if they help your headaches you should limit these to No more than 3 times a week to avoid medication overuse/rebound headaches       I do not recommend ever using "triptans" for your migraines     Over the counter preventive supplements for headaches/migraines   (to take every day to help prevent headaches - not to take at the time of headache):  [x] Riboflavin (Vitamin B2) 400mg daily   (FYI B2 may make your urine bright/neon yellow)  AND/OR  [x] Herbal medication: Petasites/Butterbur 150 mg daily  (When choosing your Butterbur online or in the store, beware that there are some poor preps containing pyrrolizidine alkaloids (PAs) that can be harmful to the liver  Therefore, do not use butterbur products that are not certified and labeled as hepatotoxic PA-free )    Lifestyle Recommendations:  [x] SLEEP - Maintain a regular sleep schedule: Adults need at least 7-8 hours of uninterrupted a night  Maintain good sleep hygiene:  Going to bed and waking up at consistent times, avoiding excessive daytime naps, avoiding caffeinated beverages in the evening, avoid excessive stimulation in the evening and generally using bed primarily for sleeping  One hour before bedtime would recommend turning lights down lower, decreasing your activity (may read quietly, listen to music at a low volume)  When you get into bed, should eliminate all technology (no texting, emailing, playing with your phone, iPad or tablet in bed)  [x] HYDRATION - Maintain good hydration  Drink  2L of fluid a day (4 typical small water bottles)  [x] DIET - Maintain good nutrition  In particular don't skip meals and try and eat healthy balanced meals regularly  [x] TRIGGERS - Look for other triggers and avoid them: Limit caffeine to 1-2 cups a day or less  Avoid dietary triggers that you have noticed bring on your headaches (this could include aged cheese, peanuts, MSG, aspartame and nitrates)  [x] EXERCISE - physical exercise as we all know is good for you in many ways, and not only is good for your heart, but also is beneficial for your mental health, cognitive health and  chronic pain/headaches  I would encourage at the least 5 days of physical exercise weekly for at least 30 minutes  Education and Follow-up  [x] Please call with any questions or concerns  [x] follow up once testing completed, bring in old tests if possible       CC: We had the pleasure of evaluating Margarita Louis in neurological consultation today  Margarita Louis is a 62 y o    right handed female who presents today for evaluation of headaches  History of Present Illness:   ENT note reviewed mentioning an episode early November where she described loss of vision slowly recovered after 30 sec  She reported a period of aphasia followed by garbled speech  History of migraines, tongue squamous cell cancer on right ventral side of tongue and floor of mouth    What is your current pain level - 0    Headaches started at what age? 9years old  - changed over time  How often do the headaches occur?    Has not had one since [place]  Will have long periods of time where she has migraines - Can go months to a year without one   12-15 a year     - in the last two years some new features:   -In sept was sitting talking to a patient and both eye completely black, could see nothing for max 30 seconds, and then slowly came back in reverse tunnel vision, then had vertical double vision, right hand and arm numbness (like usual) and felt spacey, and then he said she was talking weird and he could not understand her because it sounds like she is talking in a different language (words come out in wrong order), and she went white - took 4 exedrin, did not get a headache, lasted maybe 30 minutes  - another time could not find the 5 on the phone - could not dial a number she was looking at - lasted maybe an hour  - overall feeling weak  - after these episodes go to bed, wakes up without symptoms except for fog for a it after   - the last couple over the last couple of years does not get head pain     (before would tunnel down and work its way up, never complete loss before, in the last 10 years the language aura is new, always had the sensory aura)    - thinks she had a TTE, Carotid US and MRI back in 2013     What time of the day do the headaches start? no particular time of day  How long do the headaches last? 1-2 hours aura, head pain lasts a few hours, never more than 12 hours  Are you ever headache free? Yes    Aura? with aura - see above - visual, language, sensory auras    Describe your usual headache pain quality and location? Pain varies, starts mid frontal or left frontal, sometimes creep up from back, rarely on the right   What is the intensity of pain? 10  Associated symptoms:   [x] Nausea       [] Vomiting        [] Diarrhea  [] Insomnia     [] Stiff or sore neck   [x] Problems with concentration  [x] Photophobia     [x]Phonophobia      [x] Osmophobia  [x] Blurred vision   [x] Prefer quiet, dark room  [x] Light-headed  [x] Tinnitus   [x] Hands or feet tingle or feel numb/paresthesias  - aura     [] Red ear      [] Ptosis      [] Facial droop  [] Lacrimation  [] Nasal congestion/rhinorrhea   [] Flushing of face  [] Change in pupil size      Headache triggers:  None noticed, except for pizza/ or spagetti two days in a row      Have you seen someone else for headaches or pain? Yes, last saw neurology Dr Zhen Hodges   Have you had trigger point injection performed and how often? No  Have you had Botox injection performed and how often? No   Have you had epidural injections or transforaminal injections performed? No  Have you used CBD or THC for your headaches and how often? No  Are you current pregnant or planning on getting pregnant? No  Have you ever had any Brain imaging? Yes     What medications do you take or have you taken for your headaches?    ABORTIVE:    exedrin    PREVENTIVE:   No     Magnesium at 1000 mg and then 500 mg caused major diarrhea     Alternative therapies used in the past for headaches? chiropractic care once a month for neck disc issue, massage every 2 month     Neck pain?: no    LIFESTYLE  Sleep - averages 8 hours    Physical activity: not recently   Water: 4 bottles       Mood: no history of anxiety or depression       The following portions of the patient's history were reviewed and updated as appropriate: allergies, current medications, past family history, past medical history, past social history, past surgical history and problem list     Past Medical History:     Past Medical History:   Diagnosis Date    Cancer ([address] )     tongue    Hearing loss     down 50% in right ear    Kidney stone     6 or 7 yrs ago       Patient Active Problem List   Diagnosis    Leukoplakia of tongue    Bilateral hearing loss    Chronic fatigue    Myalgia    Arthralgia    Changes in vision    Frequent headaches    Esophageal stricture    Dry eyes    Mouth dryness    Tick bite    Chest pain    Pre-op examination    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    Memory loss    Hemispheric carotid artery syndrome    Calculus of right kidney       Medications:      No current outpatient prescriptions on file  No current facility-administered medications for this visit  Allergies: Allergies   Allergen Reactions    Bee Venom Anaphylaxis    Albuterol      tachycardia    Penicillins        Family History:   Family history of headaches:  migraine headaches in son  Any family history of aneurysms - Yes her mother noted 4 years ago   Dementia in mom around [de-identified]  No family history on file  Social History:   Work: psychologist   Education: psy[employer]  Lives with      Illicit Drugs: denies  Alcohol/tobacco: Denies tobacco use, Denies caffeine use  Alcohol: none     Social History     Social History    Marital status: /Civil Union     Spouse name: N/A    Number of children: N/A    Years of education: N/A     Occupational History    Not on file       Social History Main Topics    Smoking status: Never Smoker    Smokeless tobacco: Never Used    Alcohol use No    Drug use: No    Sexual activity: Yes     Partners: Male     Other Topics Concern    Not on file     Social History Narrative    No narrative on file         Objective:     Physical Exam:                                                                 Vitals:            Constitutional:    /78 (BP Location: Right arm, Patient Position: Sitting, Cuff Size: Standard)   Pulse 75   Ht 5' 2" (1 575 m)   Wt 72 1 kg (159 lb)   BMI 29 08 kg/m²   BP Readings from Last 3 Encounters:   02/07/19 120/78   01/09/19 133/70   12/27/18 120/76     Pulse Readings from Last 3 Encounters:   02/07/19 75   01/09/19 67   12/27/18 74         Well developed, well nourished, well groomed  No dysmorphic features  HEENT:  Normocephalic atraumatic  No meningismus  Oropharynx is clear and moist  No oral mucosal lesions  Chest:  Respirations regular and unlabored  Cardiovascular:  Regular rate, intact distal pulses  Distal extremities warm without palpable edema or tenderness, no observed significant swelling  Musculoskeletal:  Full range of motion  (see below under neurologic exam for evaluation of motor function and gait)   Skin:  warm and dry, not diaphoretic  No apparent birthmarks or stigmata of neurocutaneous disease  Psychiatric:  Normal behavior and appropriate affect        Neurological Examination:     Mental status/cognitive function:   Orientated to time, place and person  Recent and remote memory intact  Attention span and concentration as well as fund of knowledge are appropriate for age  Normal language and spontaneous speech  Cranial Nerves:  II-visual fields full  Fundi poorly visualized due to pupillary constriction (last dilated fundus exam 3/2018, next in 2 weeks)  III, IV, VI-Pupils were equal, round, and reactive to light and accomodation  Extraocular movements were full and conjugate without nystagmus  conjugate gaze, normal smooth pursuits, normal saccades   V-facial sensation symmetric      VII-facial expression symmetric, intact forehead wrinkle, strong eye closure, symmetric smile    VIII-hearing grossly intact bilaterally   IX, X-palate elevation symmetric, no dysarthria  XI-shoulder shrug strength intact    XII-tongue protrusion midline  Motor Exam: symmetric bulk and tone throughout, no pronator drift  Power/strength 5/5 bilateral upper and lower extremities, no atrophy, fasciculations or abnormal movements noted  Sensory: grossly intact light touch in all extremities  Reflexes: brachioradialis 2+, biceps 2+, knee 2+, ankle 2+ bilaterally  No ankle clonus  Coordination: Finger nose finger intact bilaterally, no apparent dysmetria, ataxia or tremor noted  Gait: steady casual and tandem gait  Romberg Negative  Pertinent lab results:    11/24/2018:  CMP and CBC unremarkable  TSH 2 17    11/24/2018:      Imaging:   MRI Brain 3/11/13 - told was normal      Review of Systems:   ROS obtained by medical assistant Personally reviewed and updated if indicated  Review of Systems   Constitutional: Negative  HENT: Negative  Eyes: Positive for visual disturbance (flashing lights)  Respiratory: Negative  Cardiovascular: Negative  Gastrointestinal: Positive for nausea  Endocrine: Negative  Genitourinary: Negative  Musculoskeletal: Positive for neck stiffness  Skin: Negative  Allergic/Immunologic: Negative  Neurological: Positive for speech difficulty and headaches (no pattern)  Hematological: Negative  Psychiatric/Behavioral: Positive for confusion, decreased concentration and sleep disturbance (less)  All other systems reviewed and are negative  I have spent 60 minutes with Patient  today in which greater than 50% of this time was spent in counseling/coordination of care regarding Prognosis, Risks and benefits of tx options, Intructions for management, Importance of tx compliance, Risk factor reductions and Impressions        Author:  Lorrie Pena MD 2/7/2019 11:31 AM

## 2019-02-07 ENCOUNTER — OFFICE VISIT (OUTPATIENT)
Dept: NEUROLOGY | Facility: CLINIC | Age: 59
End: 2019-02-07

## 2019-02-07 VITALS
HEIGHT: 62 IN | HEART RATE: 75 BPM | BODY MASS INDEX: 29.26 KG/M2 | DIASTOLIC BLOOD PRESSURE: 78 MMHG | SYSTOLIC BLOOD PRESSURE: 120 MMHG | WEIGHT: 159 LBS

## 2019-02-07 DIAGNOSIS — G43.109 MIGRAINE WITH AURA AND WITHOUT STATUS MIGRAINOSUS, NOT INTRACTABLE: Primary | ICD-10-CM

## 2019-02-07 DIAGNOSIS — G43.109 RETINAL MIGRAINE: ICD-10-CM

## 2019-02-07 PROCEDURE — 99244 OFF/OP CNSLTJ NEW/EST MOD 40: CPT | Performed by: PSYCHIATRY & NEUROLOGY

## 2019-02-07 RX ORDER — ASPIRIN 81 MG/1
81 TABLET, CHEWABLE ORAL ONCE
Status: DISCONTINUED | OUTPATIENT
Start: 2019-02-07 | End: 2019-02-07

## 2019-02-07 RX ORDER — ASPIRIN 81 MG/1
81 TABLET ORAL DAILY
Qty: 90 TABLET | Refills: 3
Start: 2019-02-07 | End: 2019-03-08 | Stop reason: HOSPADM

## 2019-02-07 NOTE — LETTER
2019     Belen Melendez, [address]  [facility]  [address]    Patient: Chio Agustin   YOB: 1960   Date of Visit: 2019       Dear Dr Maxine Valencia: Thank you for referring Chio Agustin to me for evaluation  Below are my notes for this consultation  In short, although her symptoms sound consistent with migraine with aura, stroke workup ordered to ensure symptoms not TIA, recommended asa out of an abundance of caution until work up completed   - MRI Brain, TTE, Echo  - diet and exercise  - improved lipid control     If you have questions, please do not hesitate to call me  I look forward to following your patient along with you  Sincerely,        Spencer Lott MD        CC: No Recipients  Spencer Lott MD  2019  3:59 PM  Sign at close encounter  [facility] Consult  PATIENT:  Chio Agustin  MRN:  [id number]  :  1960  DATE OF SERVICE:  2019  REFERRED BY: PHILIPPE Valverde  PMD: PHILIPPE Walls    Assessment/Plan:     Chio Agustin is a delightful 62 y o  female with a past medical history of migraine with aura, right ear hearing loss, 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 in  referred here for evaluation of headache  Neurologic assessment reveals normal neurological exam     Migraine with aura  Dr Paulette Badillo reports she has had migraines since age 9 and they have changed over her lifetime  She reports she will have long periods of time where she does not have migraines, she can go months or up to a year without one, but typically has 12-15 a year  In the last 2 years she has some new features to her Migraines with aura:  - visual auras in the past where her vision would tunnel down and worked its way back up, but in 2018 she had complete loss of vision for about 30 sec followed by slowly coming back in reversed tunnel vision    She also had vertical diplopia with this episode  - typical sensory aura with right hand and arm numbness with this episode  - language auras which started about 10 years ago, where her speech is not clear, words come out in the wrong order, which also happened with this most recent episode  - no history of hemiplegic weakness, however does feel generally "weak" all over/fatigue type - not true weakness  - over the last few years, she has had more of these auras without subsequent migraine, but she also admits that she often goes to bed and sleeps     Workup:  - she reports in 2013 she had a workup including MRI brain, carotid ultrasound, TTE that she thinks were unremarkable  However, it certainly sounds like she has had new symptoms since then  - I discussed that I recommended further evaluation for alternative cause besides migraine with aura, although the description does sound more like migraine aura than stroke, stroke evaluation is indicated in this case  - recommended MRI brain with without contrast, TTE and carotid US ordered    Preventative:  - I recommended starting aspirin 81 mg daily which she plans to buy over-the-counter, recommended optimizing lipid control/as last LDL was 157 11/2018, she reports she has made multiple lifestyle changes since then and believes it will be much lower at recheck, recommended healthy diet and exercise  - due to low frequency of her migraines, she would not like to start a prescription daily preventative at this time  - I recommended considering at least daily headache preventative supplements including riboflavin and butterbur (magnesium caused diarrhea for her in the past)  - discussed precaution with aggressive chiropractic neck adjustments due to stroke risk    Abortive:  - okay to take Excedrin in limited fashion, however recommended avoiding triptans      * I recommended that she go to the emergency department for further evaluation next time this happens        Patient instructions Additional Testing:   Neurodiagnostic workup:  Stroke work up to ensure nothing more than migraine with visual, language, basilar aura     I would recommend aspirin 81 mg daily until we evaluate for stroke risk   And discuss with your primary doctor the high cholesterol    Headache/migraine treatment:   - When you have a moderate to severe headache, you should seek rest, initiate relaxation and apply cold compresses to the head  Abortive medications (for immediate treatment of a headache): It is ok to take ibuprofen, acetaminophen or naproxen (Advil, Tylenol,  Aleve, Excedrin) if they help your headaches you should limit these to No more than 3 times a week to avoid medication overuse/rebound headaches  I do not recommend ever using "triptans" for your migraines     Over the counter preventive supplements for headaches/migraines   (to take every day to help prevent headaches - not to take at the time of headache):  [x] Riboflavin (Vitamin B2) 400mg daily   (FYI B2 may make your urine bright/neon yellow)  AND/OR  [x] Herbal medication: Petasites/Butterbur 150 mg daily  (When choosing your Butterbur online or in the store, beware that there are some poor preps containing pyrrolizidine alkaloids (PAs) that can be harmful to the liver  Therefore, do not use butterbur products that are not certified and labeled as hepatotoxic PA-free )    Lifestyle Recommendations:  [x] SLEEP - Maintain a regular sleep schedule: Adults need at least 7-8 hours of uninterrupted a night  Maintain good sleep hygiene:  Going to bed and waking up at consistent times, avoiding excessive daytime naps, avoiding caffeinated beverages in the evening, avoid excessive stimulation in the evening and generally using bed primarily for sleeping  One hour before bedtime would recommend turning lights down lower, decreasing your activity (may read quietly, listen to music at a low volume)   When you get into bed, should eliminate all technology (no texting, emailing, playing with your phone, iPad or tablet in bed)  [x] HYDRATION - Maintain good hydration  Drink  2L of fluid a day (4 typical small water bottles)  [x] DIET - Maintain good nutrition  In particular don't skip meals and try and eat healthy balanced meals regularly  [x] TRIGGERS - Look for other triggers and avoid them: Limit caffeine to 1-2 cups a day or less  Avoid dietary triggers that you have noticed bring on your headaches (this could include aged cheese, peanuts, MSG, aspartame and nitrates)  [x] EXERCISE - physical exercise as we all know is good for you in many ways, and not only is good for your heart, but also is beneficial for your mental health, cognitive health and  chronic pain/headaches  I would encourage at the least 5 days of physical exercise weekly for at least 30 minutes  Education and Follow-up  [x] Please call with any questions or concerns  [x] follow up once testing completed, bring in old tests if possible       CC: We had the pleasure of evaluating Tino Rodriguez in neurological consultation today  Tino Rodriguez is a 62 y o    right handed female who presents today for evaluation of headaches  History of Present Illness:   ENT note reviewed mentioning an episode early November where she described loss of vision slowly recovered after 30 sec  She reported a period of aphasia followed by garbled speech  History of migraines, tongue squamous cell cancer on right ventral side of tongue and floor of mouth    What is your current pain level - 0    Headaches started at what age? 9years old  - changed over time  How often do the headaches occur?    Has not had one since Septrosalva  Will have long periods of time where she has migraines - Can go months to a year without one   12-15 a year     - in the last two years some new features:   -In sept was sitting talking to a patient and both eye completely black, could see nothing for max 30 seconds, and then slowly came back in reverse tunnel vision, then had vertical double vision, right hand and arm numbness (like usual) and felt spacey, and then he said she was talking weird and he could not understand her because it sounds like she is talking in a different language (words come out in wrong order), and she went white - took 4 exedrin, did not get a headache, lasted maybe 30 minutes  - another time could not find the 5 on the phone - could not dial a number she was looking at - lasted maybe an hour  - overall feeling weak  - after these episodes go to bed, wakes up without symptoms except for fog for a it after   - the last couple over the last couple of years does not get head pain     (before would tunnel down and work its way up, never complete loss before, in the last 10 years the language aura is new, always had the sensory aura)    - thinks she had a TTE, Carotid US and MRI back in 2013     What time of the day do the headaches start? no particular time of day  How long do the headaches last? 1-2 hours aura, head pain lasts a few hours, never more than 12 hours  Are you ever headache free? Yes    Aura? with aura - see above - visual, language, sensory auras    Describe your usual headache pain quality and location? Pain varies, starts mid frontal or left frontal, sometimes creep up from back, rarely on the right   What is the intensity of pain?  10  Associated symptoms:   [x] Nausea       [] Vomiting        [] Diarrhea  [] Insomnia     [] Stiff or sore neck   [x] Problems with concentration  [x] Photophobia     [x]Phonophobia      [x] Osmophobia  [x] Blurred vision   [x] Prefer quiet, dark room  [x] Light-headed  [x] Tinnitus   [x] Hands or feet tingle or feel numb/paresthesias  - aura     [] Red ear      [] Ptosis      [] Facial droop  [] Lacrimation  [] Nasal congestion/rhinorrhea   [] Flushing of face  [] Change in pupil size      Headache triggers:  None noticed, except for pizza/ or spagetti two days in a row Have you seen someone else for headaches or pain? Yes, last saw neurology Dr Prince Mead   Have you had trigger point injection performed and how often? No  Have you had Botox injection performed and how often? No   Have you had epidural injections or transforaminal injections performed? No  Have you used CBD or THC for your headaches and how often? No  Are you current pregnant or planning on getting pregnant? No  Have you ever had any Brain imaging? Yes     What medications do you take or have you taken for your headaches? ABORTIVE:    exedrin    PREVENTIVE:   No     Magnesium at 1000 mg and then 500 mg caused major diarrhea     Alternative therapies used in the past for headaches? chiropractic care once a month for neck disc issue, massage every 2 month     Neck pain?: no    LIFESTYLE  Sleep - averages 8 hours    Physical activity: not recently   Water: 4 bottles       Mood: no history of anxiety or depression       The following portions of the patient's history were reviewed and updated as appropriate: allergies, current medications, past family history, past medical history, past social history, past surgical history and problem list     Past Medical History:     Past Medical History:   Diagnosis Date    Cancer ([facility] [address] )     tongue    Hearing loss     down 50% in right ear    Kidney stone     6 or 7 yrs ago       Patient Active Problem List   Diagnosis    Leukoplakia of tongue    Bilateral hearing loss    Chronic fatigue    Myalgia    Arthralgia    Changes in vision    Frequent headaches    Esophageal stricture    Dry eyes    Mouth dryness    Tick bite    Chest pain    Pre-op examination    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    Memory loss    Hemispheric carotid artery syndrome    Calculus of right kidney       Medications:      No current outpatient prescriptions on file  No current facility-administered medications for this visit           Allergies: Allergies   Allergen Reactions    Bee Venom Anaphylaxis    Albuterol      tachycardia    Penicillins        Family History:   Family history of headaches:  migraine headaches in son  Any family history of aneurysms  Yes her mother noted 4 years ago   Dementia in mom around [de-identified]  No family history on file  Social History:   Work: psychologist   Education: [employer]  Lives with      Illicit Drugs: denies  Alcohol/tobacco: Denies tobacco use, Denies caffeine use  Alcohol: none     Social History     Social History    Marital status: /Civil Union     Spouse name: N/A    Number of children: N/A    Years of education: N/A     Occupational History    Not on file  Social History Main Topics    Smoking status: Never Smoker    Smokeless tobacco: Never Used    Alcohol use No    Drug use: No    Sexual activity: Yes     Partners: Male     Other Topics Concern    Not on file     Social History Narrative    No narrative on file         Objective:     Physical Exam:                                                                 Vitals:            Constitutional:    /78 (BP Location: Right arm, Patient Position: Sitting, Cuff Size: Standard)   Pulse 75   Ht 5' 2" (1 575 m)   Wt 72 1 kg (159 lb)   BMI 29 08 kg/m²    BP Readings from Last 3 Encounters:   02/07/19 120/78   01/09/19 133/70   12/27/18 120/76     Pulse Readings from Last 3 Encounters:   02/07/19 75   01/09/19 67   12/27/18 74         Well developed, well nourished, well groomed  No dysmorphic features  HEENT:  Normocephalic atraumatic  No meningismus  Oropharynx is clear and moist  No oral mucosal lesions  Chest:  Respirations regular and unlabored  Cardiovascular:  Regular rate, intact distal pulses  Distal extremities warm without palpable edema or tenderness, no observed significant swelling  Musculoskeletal:  Full range of motion   (see below under neurologic exam for evaluation of motor function and gait) Skin:  warm and dry, not diaphoretic  No apparent birthmarks or stigmata of neurocutaneous disease  Psychiatric:  Normal behavior and appropriate affect        Neurological Examination:     Mental status/cognitive function:   Orientated to time, place and person  Recent and remote memory intact  Attention span and concentration as well as fund of knowledge are appropriate for age  Normal language and spontaneous speech  Cranial Nerves:  II-visual fields full  Fundi poorly visualized due to pupillary constriction (last dilated fundus exam 3/2018, next in 2 weeks)  III, IV, VI-Pupils were equal, round, and reactive to light and accomodation  Extraocular movements were full and conjugate without nystagmus  conjugate gaze, normal smooth pursuits, normal saccades   V-facial sensation symmetric  VII-facial expression symmetric, intact forehead wrinkle, strong eye closure, symmetric smile    VIII-hearing grossly intact bilaterally   IX, X-palate elevation symmetric, no dysarthria  XI-shoulder shrug strength intact    XII-tongue protrusion midline  Motor Exam: symmetric bulk and tone throughout, no pronator drift  Power/strength 5/5 bilateral upper and lower extremities, no atrophy, fasciculations or abnormal movements noted  Sensory: grossly intact light touch in all extremities  Reflexes: brachioradialis 2+, biceps 2+, knee 2+, ankle 2+ bilaterally  No ankle clonus  Coordination: Finger nose finger intact bilaterally, no apparent dysmetria, ataxia or tremor noted  Gait: steady casual and tandem gait  Romberg Negative  Pertinent lab results:    11/24/2018:  CMP and CBC unremarkable  TSH 2 17    11/24/2018:      Imaging:   MRI Brain 3/11/13 - told was normal      Review of Systems:   ROS obtained by medical assistant Personally reviewed and updated if indicated  Review of Systems   Constitutional: Negative  HENT: Negative  Eyes: Positive for visual disturbance (flashing lights)  Respiratory: Negative  Cardiovascular: Negative  Gastrointestinal: Positive for nausea  Endocrine: Negative  Genitourinary: Negative  Musculoskeletal: Positive for neck stiffness  Skin: Negative  Allergic/Immunologic: Negative  Neurological: Positive for speech difficulty and headaches (no pattern)  Hematological: Negative  Psychiatric/Behavioral: Positive for confusion, decreased concentration and sleep disturbance (less)  All other systems reviewed and are negative  I have spent 60 minutes with Patient  today in which greater than 50% of this time was spent in counseling/coordination of care regarding Prognosis, Risks and benefits of tx options, Intructions for management, Importance of tx compliance, Risk factor reductions and Impressions        Author:  Wander Hendrickson MD 2/7/2019 11:31 AM

## 2019-02-07 NOTE — PROGRESS NOTES
Patient ID: Sarah Sorenson is a 62 y o  female  Assessment/Plan:    No problem-specific Assessment & Plan notes found for this encounter  {Assess/PlanSmartLinks:73440}       Subjective:    HPI    {[facility] HPI texts:75860}    {Common ambulatory SmartLinks:52745}         Objective:    Blood pressure 120/78, pulse 75, height 5' 2" (1 575 m), weight 72 1 kg (159 lb)  Physical Exam    Neurological Exam      ROS:    Review of Systems   Constitutional: Negative  HENT: Negative  Eyes: Positive for visual disturbance (flashing lights)  Respiratory: Negative  Cardiovascular: Negative  Gastrointestinal: Positive for nausea  Endocrine: Negative  Genitourinary: Negative  Musculoskeletal: Positive for neck stiffness  Skin: Negative  Allergic/Immunologic: Negative  Neurological: Positive for speech difficulty and headaches (no pattern)  Hematological: Negative  Psychiatric/Behavioral: Positive for confusion, decreased concentration and sleep disturbance (less)  All other systems reviewed and are negative

## 2019-02-07 NOTE — PATIENT INSTRUCTIONS
Additional Testing:   Neurodiagnostic workup:  Stroke work up to ensure nothing more than migraine with visual, language, basilar aura     I would recommend aspirin 81 mg daily until we evaluate for stroke risk   And discuss with your primary doctor the high cholesterol    Headache/migraine treatment:   - When you have a moderate to severe headache, you should seek rest, initiate relaxation and apply cold compresses to the head  Abortive medications (for immediate treatment of a headache): It is ok to take ibuprofen, acetaminophen or naproxen (Advil, Tylenol,  Aleve, Excedrin) if they help your headaches you should limit these to No more than 3 times a week to avoid medication overuse/rebound headaches  I do not recommend ever using "triptans" for your migraines     Over the counter preventive supplements for headaches/migraines   (to take every day to help prevent headaches - not to take at the time of headache):  [x] Riboflavin (Vitamin B2) 400mg daily   (FYI B2 may make your urine bright/neon yellow)  AND/OR  [x] Herbal medication: Petasites/Butterbur 150 mg daily  (When choosing your Butterbur online or in the store, beware that there are some poor preps containing pyrrolizidine alkaloids (PAs) that can be harmful to the liver  Therefore, do not use butterbur products that are not certified and labeled as hepatotoxic PA-free )    Lifestyle Recommendations:  [x] SLEEP - Maintain a regular sleep schedule: Adults need at least 7-8 hours of uninterrupted a night  Maintain good sleep hygiene:  Going to bed and waking up at consistent times, avoiding excessive daytime naps, avoiding caffeinated beverages in the evening, avoid excessive stimulation in the evening and generally using bed primarily for sleeping  One hour before bedtime would recommend turning lights down lower, decreasing your activity (may read quietly, listen to music at a low volume)   When you get into bed, should eliminate all technology (no texting, emailing, playing with your phone, iPad or tablet in bed)  [x] HYDRATION - Maintain good hydration  Drink  2L of fluid a day (4 typical small water bottles)  [x] DIET - Maintain good nutrition  In particular don't skip meals and try and eat healthy balanced meals regularly  [x] TRIGGERS - Look for other triggers and avoid them: Limit caffeine to 1-2 cups a day or less  Avoid dietary triggers that you have noticed bring on your headaches (this could include aged cheese, peanuts, MSG, aspartame and nitrates)  [x] EXERCISE - physical exercise as we all know is good for you in many ways, and not only is good for your heart, but also is beneficial for your mental health, cognitive health and  chronic pain/headaches  I would encourage at the least 5 days of physical exercise weekly for at least 30 minutes  Education and Follow-up  [x] Please call with any questions or concerns     [x] follow up once testing completed, bring in old tests if possible

## 2019-02-23 ENCOUNTER — APPOINTMENT (OUTPATIENT)
Dept: LAB | Facility: CLINIC | Age: 59
End: 2019-02-23
Payer: COMMERCIAL

## 2019-02-23 ENCOUNTER — APPOINTMENT (OUTPATIENT)
Dept: LAB | Facility: CLINIC | Age: 59
End: 2019-02-23

## 2019-02-23 ENCOUNTER — TRANSCRIBE ORDERS (OUTPATIENT)
Dept: ADMINISTRATIVE | Facility: HOSPITAL | Age: 59
End: 2019-02-23

## 2019-02-23 DIAGNOSIS — D68.8 FAMILIAL MULTIPLE FACTOR DEFICIENCY SYNDROME (HCC): Primary | ICD-10-CM

## 2019-02-23 DIAGNOSIS — C76.2 MALIGNANT NEOPLASM OF ABDOMEN (HCC): ICD-10-CM

## 2019-02-23 DIAGNOSIS — C76.0 MALIGNANT NEOPLASM OF HEAD, FACE, AND NECK (HCC): ICD-10-CM

## 2019-02-23 DIAGNOSIS — C02.2 MALIGNANT NEOPLASM OF VENTRAL SURFACE OF TONGUE (HCC): ICD-10-CM

## 2019-02-23 LAB
ANION GAP SERPL CALCULATED.3IONS-SCNC: 5 MMOL/L (ref 4–13)
APTT PPP: 32 SECONDS (ref 26–38)
BASOPHILS # BLD AUTO: 0.03 THOUSANDS/ΜL (ref 0–0.1)
BASOPHILS NFR BLD AUTO: 1 % (ref 0–1)
BUN SERPL-MCNC: 16 MG/DL (ref 5–25)
CALCIUM SERPL-MCNC: 9.1 MG/DL (ref 8.3–10.1)
CHLORIDE SERPL-SCNC: 108 MMOL/L (ref 100–108)
CO2 SERPL-SCNC: 28 MMOL/L (ref 21–32)
CREAT SERPL-MCNC: 0.75 MG/DL (ref 0.6–1.3)
EOSINOPHIL # BLD AUTO: 0.12 THOUSAND/ΜL (ref 0–0.61)
EOSINOPHIL NFR BLD AUTO: 2 % (ref 0–6)
ERYTHROCYTE [DISTWIDTH] IN BLOOD BY AUTOMATED COUNT: 15.2 % (ref 11.6–15.1)
GFR SERPL CREATININE-BSD FRML MDRD: 88 ML/MIN/1.73SQ M
GLUCOSE P FAST SERPL-MCNC: 85 MG/DL (ref 65–99)
HCT VFR BLD AUTO: 42.6 % (ref 34.8–46.1)
HGB BLD-MCNC: 13.7 G/DL (ref 11.5–15.4)
IMM GRANULOCYTES # BLD AUTO: 0.02 THOUSAND/UL (ref 0–0.2)
IMM GRANULOCYTES NFR BLD AUTO: 0 % (ref 0–2)
INR PPP: 0.88 (ref 0.86–1.17)
LYMPHOCYTES # BLD AUTO: 1.68 THOUSANDS/ΜL (ref 0.6–4.47)
LYMPHOCYTES NFR BLD AUTO: 26 % (ref 14–44)
MCH RBC QN AUTO: 29.4 PG (ref 26.8–34.3)
MCHC RBC AUTO-ENTMCNC: 32.2 G/DL (ref 31.4–37.4)
MCV RBC AUTO: 91 FL (ref 82–98)
MONOCYTES # BLD AUTO: 0.39 THOUSAND/ΜL (ref 0.17–1.22)
MONOCYTES NFR BLD AUTO: 6 % (ref 4–12)
NEUTROPHILS # BLD AUTO: 4.25 THOUSANDS/ΜL (ref 1.85–7.62)
NEUTS SEG NFR BLD AUTO: 65 % (ref 43–75)
NRBC BLD AUTO-RTO: 0 /100 WBCS
PLATELET # BLD AUTO: 293 THOUSANDS/UL (ref 149–390)
PMV BLD AUTO: 11 FL (ref 8.9–12.7)
POTASSIUM SERPL-SCNC: 3.9 MMOL/L (ref 3.5–5.3)
PROTHROMBIN TIME: 12 SECONDS (ref 11.8–14.2)
RBC # BLD AUTO: 4.66 MILLION/UL (ref 3.81–5.12)
SODIUM SERPL-SCNC: 141 MMOL/L (ref 136–145)
WBC # BLD AUTO: 6.49 THOUSAND/UL (ref 4.31–10.16)

## 2019-02-23 PROCEDURE — 85025 COMPLETE CBC W/AUTO DIFF WBC: CPT | Performed by: OTOLARYNGOLOGY

## 2019-02-23 PROCEDURE — 80048 BASIC METABOLIC PNL TOTAL CA: CPT

## 2019-02-23 PROCEDURE — 85730 THROMBOPLASTIN TIME PARTIAL: CPT

## 2019-02-23 PROCEDURE — 36415 COLL VENOUS BLD VENIPUNCTURE: CPT

## 2019-02-23 PROCEDURE — 85610 PROTHROMBIN TIME: CPT

## 2019-02-23 PROCEDURE — 36415 COLL VENOUS BLD VENIPUNCTURE: CPT | Performed by: OTOLARYNGOLOGY

## 2019-03-05 ENCOUNTER — ANESTHESIA EVENT (OUTPATIENT)
Dept: PERIOP | Facility: HOSPITAL | Age: 59
End: 2019-03-05

## 2019-03-08 ENCOUNTER — HOSPITAL ENCOUNTER (OUTPATIENT)
Facility: HOSPITAL | Age: 59
Setting detail: OUTPATIENT SURGERY
Discharge: HOME | End: 2019-03-08
Attending: OTOLARYNGOLOGY | Admitting: OTOLARYNGOLOGY

## 2019-03-08 ENCOUNTER — ANESTHESIA (OUTPATIENT)
Dept: PERIOP | Facility: HOSPITAL | Age: 59
End: 2019-03-08

## 2019-03-08 VITALS
BODY MASS INDEX: 28 KG/M2 | TEMPERATURE: 97.8 F | SYSTOLIC BLOOD PRESSURE: 125 MMHG | WEIGHT: 158 LBS | OXYGEN SATURATION: 98 % | HEART RATE: 86 BPM | HEIGHT: 63 IN | DIASTOLIC BLOOD PRESSURE: 73 MMHG | RESPIRATION RATE: 18 BRPM

## 2019-03-08 DIAGNOSIS — C76.0 MALIGNANT NEOPLASM OF HEAD, FACE AND NECK (HCC): ICD-10-CM

## 2019-03-08 DIAGNOSIS — C02.2 MALIGNANT NEOPLASM OF VENTRAL SURFACE OF TONGUE (HCC): ICD-10-CM

## 2019-03-08 PROBLEM — C02.9 PRIMARY SQUAMOUS CELL CARCINOMA OF TONGUE (HCC): Status: ACTIVE | Noted: 2019-03-08

## 2019-03-08 PROCEDURE — 88309 TISSUE EXAM BY PATHOLOGIST: CPT | Performed by: PATHOLOGY

## 2019-03-08 PROCEDURE — 88331 PATH CONSLTJ SURG 1 BLK 1SPC: CPT | Performed by: PATHOLOGY

## 2019-03-08 PROCEDURE — 88331 PATH CONSLTJ SURG 1 BLK 1SPC: CPT | Performed by: OTOLARYNGOLOGY

## 2019-03-08 PROCEDURE — 88307 TISSUE EXAM BY PATHOLOGIST: CPT | Performed by: PATHOLOGY

## 2019-03-08 RX ORDER — MAGNESIUM HYDROXIDE 1200 MG/15ML
LIQUID ORAL AS NEEDED
Status: DISCONTINUED | OUTPATIENT
Start: 2019-03-08 | End: 2019-03-08 | Stop reason: HOSPADM

## 2019-03-08 RX ORDER — DEXAMETHASONE SODIUM PHOSPHATE 4 MG/ML
INJECTION, SOLUTION INTRA-ARTICULAR; INTRALESIONAL; INTRAMUSCULAR; INTRAVENOUS; SOFT TISSUE AS NEEDED
Status: DISCONTINUED | OUTPATIENT
Start: 2019-03-08 | End: 2019-03-08 | Stop reason: SURG

## 2019-03-08 RX ORDER — CLINDAMYCIN PHOSPHATE 900 MG/50ML
900 INJECTION INTRAVENOUS ONCE
Status: COMPLETED | OUTPATIENT
Start: 2019-03-08 | End: 2019-03-08

## 2019-03-08 RX ORDER — ONDANSETRON 2 MG/ML
4 INJECTION INTRAMUSCULAR; INTRAVENOUS EVERY 6 HOURS PRN
Status: DISCONTINUED | OUTPATIENT
Start: 2019-03-08 | End: 2019-03-08 | Stop reason: HOSPADM

## 2019-03-08 RX ORDER — OXYCODONE HYDROCHLORIDE AND ACETAMINOPHEN 5; 325 MG/1; MG/1
2 TABLET ORAL EVERY 4 HOURS PRN
Status: DISCONTINUED | OUTPATIENT
Start: 2019-03-08 | End: 2019-03-08 | Stop reason: HOSPADM

## 2019-03-08 RX ORDER — LIDOCAINE HYDROCHLORIDE AND EPINEPHRINE 10; 10 MG/ML; UG/ML
INJECTION, SOLUTION INFILTRATION; PERINEURAL AS NEEDED
Status: DISCONTINUED | OUTPATIENT
Start: 2019-03-08 | End: 2019-03-08 | Stop reason: HOSPADM

## 2019-03-08 RX ORDER — ACETAMINOPHEN 325 MG/1
650 TABLET ORAL EVERY 4 HOURS PRN
Status: DISCONTINUED | OUTPATIENT
Start: 2019-03-08 | End: 2019-03-08 | Stop reason: HOSPADM

## 2019-03-08 RX ORDER — GLYCOPYRROLATE 0.2 MG/ML
INJECTION INTRAMUSCULAR; INTRAVENOUS AS NEEDED
Status: DISCONTINUED | OUTPATIENT
Start: 2019-03-08 | End: 2019-03-08 | Stop reason: SURG

## 2019-03-08 RX ORDER — SODIUM CHLORIDE 9 MG/ML
125 INJECTION, SOLUTION INTRAVENOUS CONTINUOUS
Status: DISCONTINUED | OUTPATIENT
Start: 2019-03-08 | End: 2019-03-08 | Stop reason: HOSPADM

## 2019-03-08 RX ORDER — FENTANYL CITRATE 50 UG/ML
INJECTION, SOLUTION INTRAMUSCULAR; INTRAVENOUS AS NEEDED
Status: DISCONTINUED | OUTPATIENT
Start: 2019-03-08 | End: 2019-03-08 | Stop reason: SURG

## 2019-03-08 RX ORDER — PROPOFOL 10 MG/ML
INJECTION, EMULSION INTRAVENOUS AS NEEDED
Status: DISCONTINUED | OUTPATIENT
Start: 2019-03-08 | End: 2019-03-08 | Stop reason: SURG

## 2019-03-08 RX ORDER — NEOSTIGMINE METHYLSULFATE 1 MG/ML
INJECTION INTRAVENOUS AS NEEDED
Status: DISCONTINUED | OUTPATIENT
Start: 2019-03-08 | End: 2019-03-08 | Stop reason: SURG

## 2019-03-08 RX ORDER — HYDROMORPHONE HCL/PF 1 MG/ML
0.5 SYRINGE (ML) INJECTION
Status: DISCONTINUED | OUTPATIENT
Start: 2019-03-08 | End: 2019-03-08 | Stop reason: HOSPADM

## 2019-03-08 RX ORDER — ONDANSETRON 2 MG/ML
INJECTION INTRAMUSCULAR; INTRAVENOUS AS NEEDED
Status: DISCONTINUED | OUTPATIENT
Start: 2019-03-08 | End: 2019-03-08 | Stop reason: SURG

## 2019-03-08 RX ORDER — MIDAZOLAM HYDROCHLORIDE 1 MG/ML
INJECTION INTRAMUSCULAR; INTRAVENOUS AS NEEDED
Status: DISCONTINUED | OUTPATIENT
Start: 2019-03-08 | End: 2019-03-08 | Stop reason: SURG

## 2019-03-08 RX ORDER — FENTANYL CITRATE 50 UG/ML
25 INJECTION, SOLUTION INTRAMUSCULAR; INTRAVENOUS
Status: DISCONTINUED | OUTPATIENT
Start: 2019-03-08 | End: 2019-03-08 | Stop reason: HOSPADM

## 2019-03-08 RX ORDER — HYDROMORPHONE HCL/PF 1 MG/ML
SYRINGE (ML) INJECTION AS NEEDED
Status: DISCONTINUED | OUTPATIENT
Start: 2019-03-08 | End: 2019-03-08 | Stop reason: SURG

## 2019-03-08 RX ORDER — DEXTROSE AND SODIUM CHLORIDE 5; .45 G/100ML; G/100ML
125 INJECTION, SOLUTION INTRAVENOUS CONTINUOUS
Status: DISCONTINUED | OUTPATIENT
Start: 2019-03-08 | End: 2019-03-08 | Stop reason: HOSPADM

## 2019-03-08 RX ORDER — GINSENG 100 MG
CAPSULE ORAL AS NEEDED
Status: DISCONTINUED | OUTPATIENT
Start: 2019-03-08 | End: 2019-03-08 | Stop reason: HOSPADM

## 2019-03-08 RX ORDER — ROCURONIUM BROMIDE 10 MG/ML
INJECTION, SOLUTION INTRAVENOUS AS NEEDED
Status: DISCONTINUED | OUTPATIENT
Start: 2019-03-08 | End: 2019-03-08 | Stop reason: SURG

## 2019-03-08 RX ADMIN — FENTANYL CITRATE 25 MCG: 50 INJECTION, SOLUTION INTRAMUSCULAR; INTRAVENOUS at 13:46

## 2019-03-08 RX ADMIN — GLYCOPYRROLATE 0.4 MG: 0.2 INJECTION, SOLUTION INTRAMUSCULAR; INTRAVENOUS at 12:36

## 2019-03-08 RX ADMIN — PHENYLEPHRINE HYDROCHLORIDE 100 MCG: 10 INJECTION INTRAVENOUS at 10:43

## 2019-03-08 RX ADMIN — NEOSTIGMINE METHYLSULFATE 3 MG: 1 INJECTION INTRAVENOUS at 12:36

## 2019-03-08 RX ADMIN — FENTANYL CITRATE 25 MCG: 50 INJECTION, SOLUTION INTRAMUSCULAR; INTRAVENOUS at 13:36

## 2019-03-08 RX ADMIN — FENTANYL CITRATE 100 MCG: 50 INJECTION, SOLUTION INTRAMUSCULAR; INTRAVENOUS at 09:54

## 2019-03-08 RX ADMIN — PHENYLEPHRINE HYDROCHLORIDE 50 MCG: 10 INJECTION INTRAVENOUS at 10:55

## 2019-03-08 RX ADMIN — SODIUM CHLORIDE 125 ML/HR: 0.9 INJECTION, SOLUTION INTRAVENOUS at 08:24

## 2019-03-08 RX ADMIN — PROPOFOL 200 MG: 10 INJECTION, EMULSION INTRAVENOUS at 09:54

## 2019-03-08 RX ADMIN — REMIFENTANIL HYDROCHLORIDE 0.12 MCG/KG/MIN: 1 INJECTION, POWDER, LYOPHILIZED, FOR SOLUTION INTRAVENOUS at 09:54

## 2019-03-08 RX ADMIN — PHENYLEPHRINE HYDROCHLORIDE 100 MCG: 10 INJECTION INTRAVENOUS at 10:36

## 2019-03-08 RX ADMIN — SODIUM CHLORIDE: 0.9 INJECTION, SOLUTION INTRAVENOUS at 12:24

## 2019-03-08 RX ADMIN — MIDAZOLAM 2 MG: 1 INJECTION INTRAMUSCULAR; INTRAVENOUS at 09:47

## 2019-03-08 RX ADMIN — HYDROMORPHONE HYDROCHLORIDE 0.25 MG: 1 INJECTION, SOLUTION INTRAMUSCULAR; INTRAVENOUS; SUBCUTANEOUS at 12:12

## 2019-03-08 RX ADMIN — PHENYLEPHRINE HYDROCHLORIDE 50 MCG: 10 INJECTION INTRAVENOUS at 11:01

## 2019-03-08 RX ADMIN — CLINDAMYCIN PHOSPHATE 900 MG: 18 INJECTION, SOLUTION INTRAMUSCULAR; INTRAVENOUS at 10:06

## 2019-03-08 RX ADMIN — LIDOCAINE HYDROCHLORIDE 60 MG: 20 INJECTION, SOLUTION INTRAVENOUS at 09:54

## 2019-03-08 RX ADMIN — ROCURONIUM BROMIDE 50 MG: 10 INJECTION INTRAVENOUS at 09:54

## 2019-03-08 RX ADMIN — DEXAMETHASONE SODIUM PHOSPHATE 8 MG: 4 INJECTION, SOLUTION INTRAMUSCULAR; INTRAVENOUS at 09:54

## 2019-03-08 RX ADMIN — ONDANSETRON 4 MG: 2 INJECTION INTRAMUSCULAR; INTRAVENOUS at 12:24

## 2019-03-08 RX ADMIN — HYDROMORPHONE HYDROCHLORIDE 0.25 MG: 1 INJECTION, SOLUTION INTRAMUSCULAR; INTRAVENOUS; SUBCUTANEOUS at 12:24

## 2019-03-08 RX ADMIN — SODIUM CHLORIDE: 0.9 INJECTION, SOLUTION INTRAVENOUS at 10:36

## 2019-03-08 NOTE — ANESTHESIA PREPROCEDURE EVALUATION
Review of Systems/Medical History  Patient summary reviewed  Chart reviewed  No history of anesthetic complications     Cardiovascular  Negative cardio ROS    Pulmonary  Negative pulmonary ROS        GI/Hepatic  Negative GI/hepatic ROS     Comment: Tongue ca     Kidney stones,        Endo/Other  Negative endo/other ROS      GYN  Negative gynecology ROS          Hematology  Negative hematology ROS      Musculoskeletal  Negative musculoskeletal ROS        Neurology    No TIA, Headaches,    Psychology   Negative psychology ROS              Physical Exam    Airway    Mallampati score: III  TM Distance: >3 FB  Neck ROM: full     Dental   Comment: overbite, No notable dental hx     Cardiovascular  Comment: Negative ROS, Rhythm: regular, Rate: normal, Cardiovascular exam normal    Pulmonary  Pulmonary exam normal     Other Findings        Anesthesia Plan  ASA Score- 2     Anesthesia Type- general with ASA Monitors  Additional Monitors:   Airway Plan: ETT  Comment: Oral ETT ok per ENT   Plan Factors-Patient not instructed to abstain from smoking on day of procedure  Patient did not smoke on day of surgery  Induction- intravenous  Postoperative Plan- Plan for postoperative opioid use  Informed Consent- Anesthetic plan and risks discussed with spouse and patient

## 2019-03-08 NOTE — OP NOTE
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 ([address] ) [C02 2]     * Malignant neoplasm of head, face and neck (HCC) [C76 0]    Procedure(s) (LRB):  SUPRAOMOHYOID NECK DISSECTION (Bilateral)  LATERAL TONGUE SURGICAL SITE RE-EXCISION WITH FROZEN SECTION  (Right)    Specimen(s):  ID Type Source Tests Collected by Time Destination   1 : anterior margin right ventral nider surface stitch at 3:00  Tissue Tongue TISSUE EXAM Chan Quiñones DO 3/8/2019 1016    2 : bilateral neck zone 1  Tissue Lymph Node, Regional Resection TISSUE EXAM Chanrodolfo QueenQuiñonesDO 3/8/2019 1016    3 : left neck zone 2 & 3  Tissue Lymph Node, Regional Resection TISSUE EXAM Chanrodolfo QueenQuiñonesDO 3/8/2019 1200    4 : right neck zone 2 & 3  Tissue Lymph Node, Regional Resection TISSUE EXAM Chan Quiñones,  3/8/2019 1222        Estimated Blood Loss:   Minimal    Drains:  * No LDAs found *    Anesthesia Type:   General    Operative Indications:  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 (HCC) [C02 2]  Malignant neoplasm of head, face and neck ([address] ) [C76 0]      Operative Findings:  As above    Complications:   None    Procedure and Technique:  After the patient was properly identified she is placed on operating table in the usual supine position  Informed time-out was completed noting that we complete the surgery in 2 stages  Initial procedure would be re-excision of right lateral ventral tongue with frozen section diagnosis and primary closure    Second procedure was bilateral supra omohyoid neck dissections  Preop antibiotics were given within 1 hour start time  The procedure was stated confirmed by the operative surgeon, assistant surgeon nursing and anesthesia with no contraindications to proceeding  General endotracheal anesthesia was initiated when deemed adequate the tube was lateralized to the left and secured on the patient  She was placed on a shoulder roll and prepped and draped in the usual sterile fashion  3 mL of 1% lidocaine 1 to 169709 epinephrine were injected into the right lateral ventral tongue  Previous scar was identified and an elliptical incision was created anterior to previous scarring posterior to the posterior margin of the previous scar  Ventral surface of the tongue and musculature proximally 1 cm deep was excised using 15 blade scalpel well as Gill scissors  The muscle bed was cauterized and noted to be hemostatic  Primary closure with 2 0 silk sutures anteriorly into a Vicryl sutures posteriorly were used to close the ventral tongue  Hemostasis was noted  The patient was then re-prepped and draped in sterile fashion with an apron incision marked on the neck  10 mL of 1% lidocaine 1 to [id number] epinephrine were injected into the incision site  The incision site was marked just inferior to the mastoid bilaterally and connected an apron fashion  A 15 blade scalpel used to make our skin incision which was carried down through the subcutaneous tissues using sharp and blunt dissection  Skin edges were cauterized using electrocautery  Platysma was identified  and split into superior and inferior flaps with electrocautery  The flaps were raised superiorly and inferiorly  Anterior jugular veins were identified and ligated proximally and distally with 2 0 silk ties  Posterior facial veins were identified bilaterally and ligated proximally and distally with 2 0 silk ties as well as stick ties    We began on the left side and stayed on dura of the posterior facial vein as an anatomical landmark to protect the marginal mandibular nerve  We raised the superficial layer of deep cervical fascia and preserve the submandibular gland on the left identifying zone 1 as well as zones 2 through 4 along the sternocleidomastoid  This procedure was repeated on the right side and same fashion  Once full exposure was obtained, zone 1 was cleared taking all tissue inferior to the mylohyoid as well as surrounding the submandibular glands  Hypoglossal nerves were identified bilaterally and preserved  Zone 1 contents were passed off the table and labeled separately  Again a to addressing the left side 1st we identified the supra omohyoid and worked superior laterally releasing the fascial layer over the sternocleidomastoid and addressing the superior portion of zone 4 working superiorly identifying the internal jugular vein and carotid artery and taking all of the contents of zone 3 and zone 2 up to the angle of the mandible  In posteriorly identifying the inferior parotid tail  The specimen was labeled and passed off the operative field  Left supra omohyoid field was inspected and noted to be hemostatic  Our attention was then turned to the right side and again the same fashion we identified the supra omohyoid and worked at the superior margin of zone 4 including zone 3 and zone 2 with all contents removed preserving the internal jugular as well as all great vessels  The parotid tail on the right was identified and preserved  The neck was inspected noted to be hemostatic  There was no need for a drain  Platysma was then reapproximated with 4 0 Vicryl simple interrupted sutures  Hash marks on the skin were then reapproximated using 4 0 Vicryl simple interrupted sutures  The skin edges were then reapproximated using staples  The neck was cleaned and antibiotic ointment was placed on the wound  The shoulder was removed    The oral cavity was inspected and the suture line on the right lateral tongue was intact with no active bleeding no bloody discharge in the mouth  General endotracheal anesthesia was then discontinued  The patient was awakened without difficulty returned to the recovery room stable condition noting that more both marginal mandibular nerves were functional as well as tongue motion was intact     I was present for the entire procedure    Patient Disposition:  PACU     SIGNATURE: Yogi Sinha, DO  DATE: March 8, 2019  TIME: 1:02 PM

## 2019-03-08 NOTE — DISCHARGE INSTRUCTIONS
[facility]    POST OPERATIVE EXCISION/NECK DISSECTION INSTRUCTIONS        1  Keep area of incision (neck) clean and dry  Apply bacitracin ointment continuously to keep the staple line moist   You may apply ice 20 minutes on 5 minutes off to the neck as tolerated  2   You may shower 24 hours after surgery  Do not scrub incision site  Pat gently with a towel  Do not soak wound  3   No smoking  Avoid second hand smoke exposure  4   You need any foods that you may that you tolerate  Because of the wound on the lateral tongue, avoidance of spicy or acidic foods as recommended  Take care not to bite the surgical margin of the tongue  He may start with soft foods and advance as tolerated  Be cautious while brushing her teeth and do not use mouthwash that contains alcohol  He may gargle with salt water before and after meals  He may substitute to peroxide, warm water (50:50) mixture for gargling  5   Call office at [phone] or [phone] for any of the following:  Fever greater than 101°F, redness or warmth of surgical area, acute swelling of surgical area, discharge from surgical area

## 2019-10-03 ENCOUNTER — OFFICE VISIT (OUTPATIENT)
Dept: FAMILY MEDICINE CLINIC | Facility: CLINIC | Age: 59
End: 2019-10-03

## 2019-10-03 VITALS
WEIGHT: 167.8 LBS | OXYGEN SATURATION: 98 % | DIASTOLIC BLOOD PRESSURE: 76 MMHG | HEIGHT: 63 IN | TEMPERATURE: 97.4 F | BODY MASS INDEX: 29.73 KG/M2 | SYSTOLIC BLOOD PRESSURE: 110 MMHG | HEART RATE: 72 BPM

## 2019-10-03 DIAGNOSIS — Z12.39 SCREENING FOR MALIGNANT NEOPLASM OF BREAST: ICD-10-CM

## 2019-10-03 DIAGNOSIS — M25.511 ACUTE PAIN OF RIGHT SHOULDER: ICD-10-CM

## 2019-10-03 DIAGNOSIS — Z01.419 NORMAL GYNECOLOGIC EXAMINATION: ICD-10-CM

## 2019-10-03 DIAGNOSIS — M79.671 BILATERAL FOOT PAIN: ICD-10-CM

## 2019-10-03 DIAGNOSIS — E78.5 HYPERLIPIDEMIA, UNSPECIFIED HYPERLIPIDEMIA TYPE: ICD-10-CM

## 2019-10-03 DIAGNOSIS — K21.9 GASTROESOPHAGEAL REFLUX DISEASE WITHOUT ESOPHAGITIS: Primary | ICD-10-CM

## 2019-10-03 DIAGNOSIS — M79.672 BILATERAL FOOT PAIN: ICD-10-CM

## 2019-10-03 DIAGNOSIS — B37.3 CANDIDA VAGINITIS: ICD-10-CM

## 2019-10-03 PROBLEM — B37.31 CANDIDA VAGINITIS: Status: ACTIVE | Noted: 2019-10-03

## 2019-10-03 PROCEDURE — 99213 OFFICE O/P EST LOW 20 MIN: CPT | Performed by: NURSE PRACTITIONER

## 2019-10-03 RX ORDER — CYCLOBENZAPRINE HCL 5 MG
5 TABLET ORAL 3 TIMES DAILY PRN
Qty: 30 TABLET | Refills: 0 | Status: SHIPPED | OUTPATIENT
Start: 2019-10-03 | End: 2019-12-26 | Stop reason: SDUPTHER

## 2019-10-03 RX ORDER — FLUCONAZOLE 150 MG/1
150 TABLET ORAL ONCE
Qty: 1 TABLET | Refills: 0 | Status: SHIPPED | OUTPATIENT
Start: 2019-10-03 | End: 2019-10-03

## 2019-10-03 RX ORDER — PANTOPRAZOLE SODIUM 40 MG/1
40 TABLET, DELAYED RELEASE ORAL
Qty: 21 TABLET | Refills: 0 | Status: SHIPPED | OUTPATIENT
Start: 2019-10-03 | End: 2019-12-26 | Stop reason: ALTCHOICE

## 2019-10-03 NOTE — PROGRESS NOTES
Assessment/Plan:    No problem-specific Assessment & Plan notes found for this encounter  Diagnoses and all orders for this visit:    Gastroesophageal reflux disease without esophagitis  Comments:  will place referral to GI  Orders:  -     Ambulatory referral to Gastroenterology; Future    Acute pain of right shoulder  Comments:  ordered flexeril 5mg, pain is muscular in nature   Orders:  -     cyclobenzaprine (FLEXERIL) 5 mg tablet; Take 1 tablet (5 mg total) by mouth 3 (three) times a day as needed for muscle spasms for up to 30 doses    Candida vaginitis  Comments:  diflucan oral once  Orders:  -     Ambulatory referral to Obstetrics / Gynecology; Future  -     fluconazole (DIFLUCAN) 150 mg tablet; Take 1 tablet (150 mg total) by mouth once for 1 dose    Bilateral foot pain  Comments:  likely plantar fasciitis, encouraged antiinflamtory, arch support, stretches    Hyperlipidemia, unspecified hyperlipidemia type  Comments:  update lipids  Orders:  -     Basic metabolic panel; Future  -     Lipid Panel with Direct LDL reflex; Future    Normal gynecologic examination  Comments:  referral to GYN placed          Subjective:      Patient ID: Emperatriz Lee is a 61 y o  female  Pt here today with multiple questions  Pt remains without insurance  She states she is experiencing increased stress with her 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  She also shares that her feet hurt  She notes pain after sitting for long periods  She notes pain worse in the morning and worse at night  She notes pain in the soles and also her heels  She is also s/p excisional surgery for SCC of the tongue  She states she feels she healed well  She states she feels her "collar bone is higher" on the right side  She notes shoulder and neck discomfort  She states she did recently do some heavy lifting in the home  She also notes increased sx of acid reflux  She states she occasionally awakes with sx of nausea at night   She has a sensation of food getting stuck and she can feel this sensation in her thoracic back  She states she has not had an EGD and she does not have insurance  She also shares she has a vaginal yeast infection  She notes white discharge  She notes some pain with urination  The following portions of the patient's history were reviewed and updated as appropriate:   She  has a past medical history of Cancer [facility]), Hearing loss, Kidney stone, Lumbar herniated disc, Neck pain, Prediabetes, 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[address] ) (3/8/2019), and Wears glasses  She   Patient Active Problem List    Diagnosis Date Noted    Gastroesophageal reflux disease without esophagitis 10/03/2019    Acute pain of right shoulder 10/03/2019    Candida vaginitis 10/03/2019    Bilateral foot pain 10/03/2019    Hyperlipidemia 10/03/2019    Normal gynecologic examination 10/03/2019    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HCC) 03/08/2019    Migraine with aura and without status migrainosus, not intractable 02/07/2019    Pre-op examination 12/27/2018    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 12/27/2018    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    Bilateral hearing loss 11/15/2018    Chronic fatigue 11/15/2018    Myalgia 11/15/2018    Arthralgia 11/15/2018    Changes in vision 11/15/2018    Frequent headaches 11/15/2018    Esophageal stricture 11/15/2018    Dry eyes 11/15/2018    Mouth dryness 11/15/2018    Tick bite 11/15/2018    Chest pain 11/15/2018    Memory loss 10/15/2015    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    Calculus of right kidney 10/15/2015     She  has a past surgical history that includes Tonsillectomy; Hysterectomy; Tubal ligation; [place] tooth extraction; pr excis tongue lesn (N/A, 1/9/2019); pr removal nodes, neck,cerv mod rad (Bilateral, 3/8/2019); and pr excis tongue lesn (Right, 3/8/2019)    Her family history includes Diabetes in her father; Hypertension in her father and mother  She  reports that she has never smoked  She has never used smokeless tobacco  She reports that she does not drink alcohol or use drugs  Current Outpatient Medications   Medication Sig Dispense Refill    cyanocobalamin (VITAMIN B-12) 1,000 mcg tablet Take 1,000 mcg by mouth      cyclobenzaprine (FLEXERIL) 5 mg tablet Take 1 tablet (5 mg total) by mouth 3 (three) times a day as needed for muscle spasms for up to 30 doses 30 tablet 0    fluconazole (DIFLUCAN) 150 mg tablet Take 1 tablet (150 mg total) by mouth once for 1 dose 1 tablet 0    Vitamin D, Cholecalciferol, 400 units TABS Take by mouth       No current facility-administered medications for this visit  Current Outpatient Medications on File Prior to Visit   Medication Sig    cyanocobalamin (VITAMIN B-12) 1,000 mcg tablet Take 1,000 mcg by mouth    Vitamin D, Cholecalciferol, 400 units TABS Take by mouth     No current facility-administered medications on file prior to visit  She is allergic to albuterol; bee venom; and penicillins       Review of Systems   Constitutional: Negative for activity change, appetite change, chills, diaphoresis, fatigue, fever and unexpected weight change  HENT: Negative for congestion, ear pain, hearing loss, postnasal drip, sinus pressure, sinus pain, sneezing and sore throat  Eyes: Negative for pain, redness and visual disturbance  Respiratory: Negative for cough and shortness of breath  Cardiovascular: Negative for chest pain and leg swelling  Gastrointestinal: Positive for nausea  Negative for abdominal pain, diarrhea and vomiting  Genitourinary: Positive for dysuria and vaginal discharge  White vaginal discharge   Musculoskeletal: Negative for arthralgias  Bilateral foot pain    R shoulder muscular pain   Neurological: Negative for dizziness and light-headedness  Psychiatric/Behavioral: Negative for behavioral problems and dysphoric mood  Objective:      /76   Pulse 72   Temp (!) 97 4 °F (36 3 °C)   Ht 5' 3" (1 6 m)   Wt 76 1 kg (167 lb 12 8 oz)   SpO2 98%   BMI 29 72 kg/m²          Physical Exam   Constitutional: She is oriented to person, place, and time  Vital signs are normal  She appears well-developed and well-nourished  No distress  HENT:   Head: Normocephalic and atraumatic  Eyes: Pupils are equal, round, and reactive to light  Neck: Normal range of motion  No thyromegaly present  Cardiovascular: Normal rate, regular rhythm, normal heart sounds and intact distal pulses  No murmur heard  Pulmonary/Chest: Effort normal and breath sounds normal  No respiratory distress  She has no wheezes  Abdominal: Soft  Bowel sounds are normal    Musculoskeletal: Normal range of motion  Right shoulder: She exhibits tenderness and spasm  Neurological: She is alert and oriented to person, place, and time  Skin: Skin is warm and dry  Psychiatric: She has a normal mood and affect  BMI Counseling: Body mass index is 29 72 kg/m²  The BMI is above normal  Nutrition recommendations include reducing portion sizes, decreasing overall calorie intake and 3-5 servings of fruits/vegetables daily  Exercise recommendations include moderate aerobic physical activity for 150 minutes/week and exercising 3-5 times per week

## 2019-10-03 NOTE — PATIENT INSTRUCTIONS
Plantar Fasciitis Exercises   WHAT YOU NEED TO KNOW:   Plantar fasciitis exercises help stretch your plantar fascia, calf muscles, and Achilles tendon  They also help strengthen the muscles that support your heel and foot  Exercises and stretching can help prevent plantar fasciitis from getting worse or coming back  DISCHARGE INSTRUCTIONS:   Contact your healthcare provider if:   · Your pain and swelling increase  · You develop new knee, hip, or back pain  · You have questions or concerns about your condition or care  How to do plantar fasciitis exercises:  Ask your healthcare provider when to start these exercises and how often to do them  · Heel stretch:  Stand up straight with your hands on a wall  Place your injured leg slightly behind your other leg  Keep your heels flat on the floor, lean forward, and bend both knees  Hold for 30 seconds  · Calf stretch:  Stand up straight with your hands on a wall  Step forward so that your uninjured foot is in front of your injured foot  Keep your front leg bent and your back leg straight  Gently lean forward until you feel your calf stretch  Hold for 30 seconds and then relax  · Seated plantar fascia stretch:  Sit on a firm surface, such as the floor or a mat  Extend your legs out in front of you  Raise your injured foot a few inches off the ground  Keep your leg straight  Grab the toes of your injured foot and pull them toward you  With your other hand, feel your plantar fascia  You should feel it press outward  Hold for 30 seconds  If you cannot reach your toes, loop a towel or tie around your foot  Gently pull on the towel or tie and flex your toes toward you  · Heel raises:  Stand on the injured leg  Raise your other leg off the ground  Hold onto a railing or wall for balance  Slowly rise up on the toes of your injured leg  Hold for 5 seconds  Slowly lower your heel to the ground             · Toe curls:  Place a towel on the floor  Put your foot flat on the towel  Grab the towel with your toes by curling them around the towel  Lift the towel up with your toes  Follow up with your healthcare provider as directed:  Write down your questions so you remember to ask them during your visits  © 2017 [employer] Information is for End User's use only and may not be sold, redistributed or otherwise used for commercial purposes  All illustrations and images included in CareNotes® are the copyrighted property of [employer] , [employer]  or [employer]  The above information is an  only  It is not intended as medical advice for individual conditions or treatments  Talk to your doctor, nurse or pharmacist before following any medical regimen to see if it is safe and effective for you

## 2019-10-10 ENCOUNTER — HOSPITAL ENCOUNTER (OUTPATIENT)
Dept: MAMMOGRAPHY | Facility: CLINIC | Age: 59
Discharge: HOME | End: 2019-10-10

## 2019-10-10 VITALS — WEIGHT: 165 LBS | HEIGHT: 63 IN | BODY MASS INDEX: 29.23 KG/M2

## 2019-10-10 DIAGNOSIS — Z12.39 SCREENING FOR MALIGNANT NEOPLASM OF BREAST: ICD-10-CM

## 2019-10-10 PROCEDURE — 77067 SCR MAMMO BI INCL CAD: CPT

## 2019-10-10 PROCEDURE — G0279 TOMOSYNTHESIS, MAMMO: HCPCS

## 2019-10-19 ENCOUNTER — APPOINTMENT (OUTPATIENT)
Dept: LAB | Facility: CLINIC | Age: 59
End: 2019-10-19
Payer: COMMERCIAL

## 2019-10-19 DIAGNOSIS — E78.5 HYPERLIPIDEMIA, UNSPECIFIED HYPERLIPIDEMIA TYPE: ICD-10-CM

## 2019-10-19 LAB
ANION GAP SERPL CALCULATED.3IONS-SCNC: 5 MMOL/L (ref 4–13)
BUN SERPL-MCNC: 22 MG/DL (ref 5–25)
CALCIUM SERPL-MCNC: 9.3 MG/DL (ref 8.3–10.1)
CHLORIDE SERPL-SCNC: 108 MMOL/L (ref 100–108)
CHOLEST SERPL-MCNC: 242 MG/DL (ref 50–200)
CO2 SERPL-SCNC: 27 MMOL/L (ref 21–32)
CREAT SERPL-MCNC: 0.77 MG/DL (ref 0.6–1.3)
GFR SERPL CREATININE-BSD FRML MDRD: 85 ML/MIN/1.73SQ M
GLUCOSE P FAST SERPL-MCNC: 103 MG/DL (ref 65–99)
HDLC SERPL-MCNC: 66 MG/DL (ref 40–60)
LDLC SERPL CALC-MCNC: 159 MG/DL (ref 0–100)
POTASSIUM SERPL-SCNC: 4.2 MMOL/L (ref 3.5–5.3)
SODIUM SERPL-SCNC: 140 MMOL/L (ref 136–145)
TRIGL SERPL-MCNC: 86 MG/DL

## 2019-10-19 PROCEDURE — 80048 BASIC METABOLIC PNL TOTAL CA: CPT

## 2019-10-19 PROCEDURE — 80061 LIPID PANEL: CPT

## 2019-10-19 PROCEDURE — 36415 COLL VENOUS BLD VENIPUNCTURE: CPT

## 2019-10-24 ENCOUNTER — OFFICE VISIT (OUTPATIENT)
Dept: OBGYN CLINIC | Facility: CLINIC | Age: 59
End: 2019-10-24

## 2019-10-24 VITALS — BODY MASS INDEX: 30.02 KG/M2 | WEIGHT: 166.8 LBS | DIASTOLIC BLOOD PRESSURE: 80 MMHG | SYSTOLIC BLOOD PRESSURE: 122 MMHG

## 2019-10-24 DIAGNOSIS — Z01.419 ENCNTR FOR GYN EXAM (GENERAL) (ROUTINE) W/O ABN FINDINGS: Primary | ICD-10-CM

## 2019-10-24 DIAGNOSIS — Z12.39 SCREENING FOR MALIGNANT NEOPLASM OF BREAST: ICD-10-CM

## 2019-10-24 DIAGNOSIS — B37.3 CANDIDA VAGINITIS: ICD-10-CM

## 2019-10-24 PROCEDURE — G0145 SCR C/V CYTO,THINLAYER,RESCR: HCPCS | Performed by: STUDENT IN AN ORGANIZED HEALTH CARE EDUCATION/TRAINING PROGRAM

## 2019-10-24 PROCEDURE — 87624 HPV HI-RISK TYP POOLED RSLT: CPT | Performed by: STUDENT IN AN ORGANIZED HEALTH CARE EDUCATION/TRAINING PROGRAM

## 2019-10-24 PROCEDURE — 99386 PREV VISIT NEW AGE 40-64: CPT | Performed by: STUDENT IN AN ORGANIZED HEALTH CARE EDUCATION/TRAINING PROGRAM

## 2019-10-24 RX ORDER — LANOLIN ALCOHOL/MO/W.PET/CERES
1 CREAM (GRAM) TOPICAL 3 TIMES DAILY
COMMUNITY

## 2019-10-24 RX ORDER — ASCORBIC ACID 1000 MG
TABLET ORAL
COMMUNITY

## 2019-10-24 RX ORDER — CINNAMON
OIL (ML) MISCELLANEOUS
COMMUNITY

## 2019-10-24 NOTE — PROGRESS NOTES
Assessment/Plan:    Encntr for gyn exam (general) (routine) w/o abn findings  60 yo postmenopausal [id number] here for annual/new patient visit  History of total hysterectomy with removal of tubes and ovaries in 2010 for cervical dysplasia  Pap most recent 2014 NILM, no HPV collected  Repeat pap and HPV collected today  Mammo 10/10/19 negative, repeat in one year, appt scheduled and slip given  Patient has GI appt next month for discussion of colonoscopy  Dexa low risk, repeat at 72, discussed Ca and vit D supplementation  Discussed healthy diet and exercise       Diagnoses and all orders for this visit:    Encntr for gyn exam (general) (routine) w/o abn findings    Screening for malignant neoplasm of breast    Candida vaginitis  Comments:  diflucan oral once  Orders:  -     Ambulatory referral to Obstetrics / Gynecology    Other orders  -     Cancel: Mammo screening bilateral w 3d & cad; Future  -     Cinnamon Oil OIL; by Does not apply route  -     Ginkgo Biloba 40 MG TABS; Take by mouth  -     glucosamine-chondroitin 500-400 MG tablet; Take 1 tablet by mouth 3 (three) times a day          Subjective:      Patient ID: Rima Edwards is a 61 y o  female  60 yo [id number] postmenopausal patient here for annual exam as new patient  She had a hysterectomy in 2008 for persistent cervical dysplasia (CHIN/BSO)  She was no HRT for 2-3 months  No bleeding since  No complaints today  She recently had head and neck cancer requiring neck dissection but is overall doing well and recovering at this time  She is sexually active with her  without issue or concern  She sticks to an "anti-cancer" diet with lots of fruits and vegetables  Needs to increase her activity as she recovers  Recent yeast infection that resolved with one dose of diflucan         The following portions of the patient's history were reviewed and updated as appropriate: allergies, current medications, past family history, past medical history, past social history, past surgical history and problem list     Review of Systems   Constitutional: Negative for chills and fever  Respiratory: Negative for shortness of breath  Cardiovascular: Negative for chest pain  Gastrointestinal: Positive for diarrhea (loose stools attributed to fiber intake)  Negative for constipation and nausea  Genitourinary: Negative for dysuria, frequency, vaginal bleeding and vaginal discharge  Musculoskeletal: Positive for arthralgias (right shoulder pain due to activity)  Objective:      /80 (BP Location: Right arm, Patient Position: Sitting, Cuff Size: Standard)   Wt 75 7 kg (166 lb 12 8 oz)   BMI 30 02 kg/m²          Physical Exam   Constitutional: She appears well-developed and well-nourished  HENT:   Head: Normocephalic  Neck: No thyromegaly present  Large 8-10 cm scar across neck from prior neck dissection     Pulmonary/Chest: Breath sounds normal  Right breast exhibits no inverted nipple, no mass, no nipple discharge, no skin change and no tenderness  Left breast exhibits no inverted nipple, no mass, no nipple discharge, no skin change and no tenderness  Breasts are symmetrical    Abdominal: Soft  She exhibits no distension and no mass  There is no tenderness  There is no rebound and no guarding  Genitourinary: Vagina normal  There is no rash, tenderness, lesion or injury on the right labia  There is no rash, tenderness, lesion or injury on the left labia  Right adnexum displays no mass, no tenderness and no fullness  Left adnexum displays no mass, no tenderness and no fullness  No tenderness or bleeding in the vagina  No signs of injury around the vagina  No vaginal discharge found  Genitourinary Comments: Cervix, uterus and ovaries surgically absent  No pelvic masses palpated  Vagina with atrophy consistent with postmenopausal status

## 2019-10-24 NOTE — ASSESSMENT & PLAN NOTE
60 yo postmenopausal [id number] here for annual/new patient visit  History of total hysterectomy with removal of tubes and ovaries in 2010 for cervical dysplasia  Pap most recent 2014 NILM, no HPV collected  Repeat pap and HPV collected today  Mammo 10/10/19 negative, repeat in one year, appt scheduled and slip given    Patient has GI appt next month for discussion of colonoscopy  Dexa low risk, repeat at 72, discussed Ca and vit D supplementation  Discussed healthy diet and exercise

## 2019-10-25 LAB
HPV HR 12 DNA CVX QL NAA+PROBE: NEGATIVE
HPV16 DNA CVX QL NAA+PROBE: NEGATIVE
HPV18 DNA CVX QL NAA+PROBE: NEGATIVE

## 2019-10-30 ENCOUNTER — TELEPHONE (OUTPATIENT)
Dept: OBGYN CLINIC | Facility: CLINIC | Age: 59
End: 2019-10-30

## 2019-10-30 LAB
LAB AP GYN PRIMARY INTERPRETATION: NORMAL
Lab: NORMAL

## 2019-11-13 ENCOUNTER — OFFICE VISIT (OUTPATIENT)
Dept: GASTROENTEROLOGY | Facility: CLINIC | Age: 59
End: 2019-11-13

## 2019-11-13 VITALS
HEART RATE: 86 BPM | SYSTOLIC BLOOD PRESSURE: 120 MMHG | WEIGHT: 168 LBS | HEIGHT: 62 IN | DIASTOLIC BLOOD PRESSURE: 90 MMHG | BODY MASS INDEX: 30.91 KG/M2

## 2019-11-13 DIAGNOSIS — R13.19 ESOPHAGEAL DYSPHAGIA: Primary | ICD-10-CM

## 2019-11-13 PROCEDURE — 99243 OFF/OP CNSLTJ NEW/EST LOW 30: CPT | Performed by: INTERNAL MEDICINE

## 2019-11-13 NOTE — PROGRESS NOTES
[facility] - Outpatient Consultation  Yumiko Goodman 61 y o  female MRN: [id number]  Encounter: [id number]          ASSESSMENT AND PLAN:      1  Esophageal dysphagia    2  Colorectal cancer screening     All will schedule both esophagogastroduodenoscopy as well as colonoscopy to be performed sometime within the beginning of next year, 2020    I have advised her to stay not eat within 2 hours of lying down  I told her that if she continues to have problems with a sour or acid like taste in the morning then she should begin taking an H2 antagonist such as Pepcid, Tagamet, or Axid at bedtime      ______________________________________________________________________    HPI:  This 61year old female comes to the office today with a complaint of recurring dysphagia  This began about 2 months ago  She has been having difficulty with solid foods  She denies any problems with liquids  She has had a prior history of a Schatzki's ring 1st diagnosed in 1996  Esophagogastroduodenoscopy demonstrated the Schatzki's ring and she was dilated with the 48 Arabic Savary dilator  She subsequently underwent EGDs with dilations in 2001 and again in 2006  Each time she has had the dilations, the dysphagia symptoms have improved  She admits to occasional heartburn  She also states that she sometimes wakes in the morning with a acid like taste in the mouth  She admits to eating late at night time before she goes to bed  She denies taking any H2 antagonists or proton pump inhibitors  She denies any weight loss or weight gain  She denies abdominal pain, nausea, vomiting, diarrhea, bloating, gaseousness, odynophagia  She has never undergone colonoscopy  She states that currently she does not have health insurance  She wants to have both esophagogastroduodenoscopy and colonoscopy performed but she does not want to do it at this time    She was planning on putting in often to the beginning of next year       REVIEW OF SYSTEMS:    CONSTITUTIONAL: Denies any fever, chills, rigors, and weight loss  HEENT: No earache or tinnitus  Denies hearing loss or visual disturbances  CARDIOVASCULAR: No chest pain or palpitations  RESPIRATORY: Denies any cough, hemoptysis, shortness of breath or dyspnea on exertion  GASTROINTESTINAL: As noted in the History of Present Illness  GENITOURINARY: No problems with urination  Denies any hematuria or dysuria  NEUROLOGIC: No dizziness or vertigo, denies headaches  MUSCULOSKELETAL: Denies any muscle or joint pain  SKIN: Denies skin rashes or itching  ENDOCRINE: Denies excessive thirst  Denies intolerance to heat or cold  PSYCHOSOCIAL: Denies depression or anxiety  Denies any recent memory loss  Historical Information   Past Medical History:   Diagnosis Date    Abnormal Pap smear of cervix     Cancer ([facility] ) 12/2018    oral    Hearing loss     down 50% in right ear    Kidney stone     6 or 7 yrs ago    Lumbar herniated disc     Migraine     Neck pain     Prediabetes     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[facility] ) 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    Varicella     Wears glasses      Past Surgical History:   Procedure Laterality Date    HYSTERECTOMY  2007    OOPHORECTOMY Bilateral 2007    W[place] EXCIS TONGUE LESN N/A 1/9/2019    Procedure: Partial glossectomy with FROZEN SECTION;  Surgeon: Sam Grace DO;  [facility];  Service: ENT    W[place] EXCIS TONGUE LESN Right 3/8/2019    Procedure: LATERAL TONGUE SURGICAL SITE RE-EXCISION WITH FROZEN SECTION ;  Surgeon: Sam Grace DO;  [facility];  Service: ENT    WY REMOVAL NODES, NECK,CERV MOD RAD Bilateral 3/8/2019    Procedure: SUPRAOMOHYOID NECK DISSECTION;  Surgeon:  Sam Grace DO;  [facility];  Service: ENT    TONSILLECTOMY      TUBAL LIGATION      WISDOM TOOTH EXTRACTION       Social History   Social History     Substance and Sexual Activity   Alcohol Use No     Social History Substance and Sexual Activity   Drug Use No     Social History     Tobacco Use   Smoking Status Never Smoker   Smokeless Tobacco Never Used     Family History   Problem Relation Age of Onset    Hypertension Mother     Diabetes Father     Hypertension Father     No Known Problems Daughter     No Known Problems Maternal Grandmother     No Known Problems Paternal Grandmother     No Known Problems Paternal Aunt     No Known Problems Paternal Aunt     Breast cancer Neg Hx     Endometrial cancer Neg Hx     Ovarian cancer Neg Hx        Meds/Allergies       Current Outpatient Medications:     Chromium Picolinate 200 MCG CAPS    Cinnamon Oil OIL    cyanocobalamin (VITAMIN B-12) 1,000 mcg tablet    Ginkgo Biloba 40 MG TABS    glucosamine-chondroitin 500-400 MG tablet    Riboflavin (B2 PO)    Vitamin D, Cholecalciferol, 400 units TABS    cyclobenzaprine (FLEXERIL) 5 mg tablet    pantoprazole (PROTONIX) 40 mg tablet    Allergies   Allergen Reactions    Albuterol      tachycardia    Bee Venom Anaphylaxis    Penicillins      As a toddler           Objective     Blood pressure 120/90, pulse 86, height 5' 2" (1 575 m), weight 76 2 kg (168 lb), not currently breastfeeding  Body mass index is 30 73 kg/m²  PHYSICAL EXAM:      General Appearance:   Alert, cooperative, no distress   HEENT:   Normocephalic, atraumatic, anicteric      Neck:  Supple, symmetrical, trachea midline   Lungs:   Clear to auscultation bilaterally; no rales, rhonchi or wheezing; respirations unlabored    Heart[de-identified]   Regular rate and rhythm; no murmur, rub, or gallop     Abdomen:   Soft, non-tender, non-distended; normal bowel sounds; no masses, no organomegaly    Genitalia:   Deferred    Rectal:   Deferred    Extremities:  No cyanosis, clubbing or edema    Pulses:  2+ and symmetric    Skin:  No jaundice, rashes, or lesions    Lymph nodes:  No palpable cervical lymphadenopathy        Lab Results:   No visits with results within 1 Day(s) from this visit  Latest known visit with results is:   Office Visit on 10/24/2019   Component Date Value    Case Report 10/24/2019                      Value:Gynecologic Cytology Report                       Case: [id number]                                  Authorizing Provider:  Lisa Mast MD         Collected:           10/24/2019 0909              Ordering Location:     [facility]     Received:            10/24/2019 0909                                     [facility]                                                                               [place]                                                                       First Screen:          Luc Gill, CT                                                       Specimen:    LIQUID-BASED PAP, SCREENING, Cervix                                                        Primary Interpretation 10/24/2019 Negative for intraepithelial lesion or malignancy     Specimen Adequacy 10/24/2019 Satisfactory for evaluation  (See note)     Note 10/24/2019                      Value: This result contains rich text formatting which cannot be displayed here   Additional Information 10/24/2019                      Value: This result contains rich text formatting which cannot be displayed here   HPV Other HR 10/24/2019 Negative     HPV16 10/24/2019 Negative     HPV18 10/24/2019 Negative          Radiology Results:   No results found

## 2019-12-26 ENCOUNTER — OFFICE VISIT (OUTPATIENT)
Dept: FAMILY MEDICINE CLINIC | Facility: CLINIC | Age: 59
End: 2019-12-26

## 2019-12-26 VITALS
HEART RATE: 90 BPM | OXYGEN SATURATION: 98 % | BODY MASS INDEX: 31.91 KG/M2 | TEMPERATURE: 97.9 F | WEIGHT: 173.4 LBS | SYSTOLIC BLOOD PRESSURE: 118 MMHG | DIASTOLIC BLOOD PRESSURE: 74 MMHG | HEIGHT: 62 IN

## 2019-12-26 DIAGNOSIS — M72.2 PLANTAR FASCIITIS: ICD-10-CM

## 2019-12-26 DIAGNOSIS — M25.511 ACUTE PAIN OF RIGHT SHOULDER: ICD-10-CM

## 2019-12-26 DIAGNOSIS — C02.9 PRIMARY SQUAMOUS CELL CARCINOMA OF TONGUE (HCC): ICD-10-CM

## 2019-12-26 DIAGNOSIS — E78.5 HYPERLIPIDEMIA, UNSPECIFIED HYPERLIPIDEMIA TYPE: ICD-10-CM

## 2019-12-26 DIAGNOSIS — K21.9 GASTROESOPHAGEAL REFLUX DISEASE WITHOUT ESOPHAGITIS: Primary | ICD-10-CM

## 2019-12-26 PROBLEM — H53.9 CHANGES IN VISION: Status: RESOLVED | Noted: 2018-11-15 | Resolved: 2019-12-26
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
## 2019-12-26 PROBLEM — B37.31 CANDIDA VAGINITIS: Status: RESOLVED | Noted: 2019-10-03 | Resolved: 2019-12-26

## 2019-12-26 PROBLEM — R51.9 FREQUENT HEADACHES: Status: RESOLVED | Noted: 2018-11-15 | Resolved: 2019-12-26

## 2019-12-26 PROBLEM — R07.9 CHEST PAIN: Status: RESOLVED | Noted: 2018-11-15 | Resolved: 2019-12-26

## 2019-12-26 PROBLEM — Z01.818 PRE-OP EXAMINATION: Status: RESOLVED | Noted: 2018-12-27 | Resolved: 2019-12-26

## 2019-12-26 PROBLEM — M79.10 MYALGIA: Status: RESOLVED | Noted: 2018-11-15 | Resolved: 2019-12-26

## 2019-12-26 PROBLEM — B37.3 CANDIDA VAGINITIS: Status: RESOLVED | Noted: 2019-10-03 | Resolved: 2019-12-26

## 2019-12-26 PROCEDURE — 99213 OFFICE O/P EST LOW 20 MIN: CPT | Performed by: NURSE PRACTITIONER

## 2019-12-26 RX ORDER — CYCLOBENZAPRINE HCL 5 MG
5 TABLET ORAL 3 TIMES DAILY PRN
Qty: 30 TABLET | Refills: 0 | Status: SHIPPED | OUTPATIENT
Start: 2019-12-26 | End: 2020-01-30

## 2020-01-30 ENCOUNTER — OFFICE VISIT (OUTPATIENT)
Dept: RHEUMATOLOGY | Facility: CLINIC | Age: 60
End: 2020-01-30

## 2020-01-30 VITALS
DIASTOLIC BLOOD PRESSURE: 78 MMHG | HEIGHT: 62 IN | WEIGHT: 172 LBS | BODY MASS INDEX: 31.65 KG/M2 | HEART RATE: 84 BPM | SYSTOLIC BLOOD PRESSURE: 122 MMHG

## 2020-01-30 DIAGNOSIS — M79.10 MYALGIA: Primary | ICD-10-CM

## 2020-01-30 DIAGNOSIS — R53.83 FATIGUE, UNSPECIFIED TYPE: ICD-10-CM

## 2020-01-30 DIAGNOSIS — M25.50 POLYARTHRALGIA: ICD-10-CM

## 2020-01-30 DIAGNOSIS — M85.852 OSTEOPENIA OF LEFT HIP: ICD-10-CM

## 2020-01-30 PROCEDURE — 99244 OFF/OP CNSLTJ NEW/EST MOD 40: CPT | Performed by: INTERNAL MEDICINE

## 2020-01-30 NOTE — PROGRESS NOTES
Assessment and Plan:   Patient is a 49-year-old female who presents for rheumatology consult regarding suggestion of prior diagnosis of rheumatoid arthritis over 20 years ago and for re-evaluation of her chronic joint pain  She recalls that the previous rheumatologist 20 years ago felt she possibly had seronegative RA at that time but patient never consistently followed up as she does not want to take any medications  Over the year she has had intermittent joint pain in her fingers, knees, feet and chronic lower back pain and states she does have degenerative disc disease  She does not have inflammatory features to her joint pain as she has no significant morning stiffness  She does have chronic feet pain that is present at all times and does not improve with activity  Her exam today does not show any synovitis and also does not show any fixed rheumatoid deformities I would expect to see after 20 years of untreated RA  We did discuss that today in the office in that my suspicion is low for RA  She is self pay today and so we discussed I will do a limited workup to try to rule out RA since suspicion is low and she was agreeable with that plan  As long as labs and x-rays do not suggest RA she will not need additional Rheumatology follow-up  Plan:  Diagnoses and all orders for this visit:    Myalgia  -     Sjogren's Antibodies  -     RF Screen w/ Reflex to Titer  -     Cyclic citrul peptide antibody, IgG    Osteopenia of left hip    Fatigue, unspecified type    Polyarthralgia  -     XR hand 3+ vw right; Future  -     XR hand 3+ vw left; Future  -     XR foot 3+ vw right; Future  -     XR foot 3+ vw left;  Future  -     Sjogren's Antibodies  -     RF Screen w/ Reflex to Titer  -     Cyclic citrul peptide antibody, IgG        Follow-up plan: no rheumatology follow-up needed if work-up negative       [facility]  Rocael Juárez is a 61 y o   female with GERD, excision of squamous cell carcinoma of the tongue with neck dissection 03/2019, sschatzki's ring, migraines, hemochromatosis carrier, osteopenia, who presents for rheumatology consult by request of Bobo Arellano for myalgia and chronic fatigue  Patient reports she saw a rheumatologist>20 years ago, who thought she possibly had seronegative RA, but patient never followed-up because she did not want to take medications for this  Also does not think she has RA  Wants re-evaluation for this  "My knees are killing me " Knees hurt most when going up and down stair  Also has "fingers going crooked" with joint pain  Her feet hurt as soon as she gets out of bed in the morning, but never improve with movement or throughout the day, actually they worsen the more she walks  She does not have joint pain every day and it comes and goes  She states there are long periods of time where she has no joint pain  No joint swelling  Aside from her feet pain in the morning no other morning joint pain or stiffness  She has never seen a podiatrist   She has chronic dry eyes and occasional dry mouth  Denies photosensitivity, rashes, psoriasis, oral or nasal ulcers, alopecia, Raynaud's, h/o pericarditis or pleurisy, h/o blood clots  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  Gets a hoarse voice by the end of the day and has a dry throat  Also types a lot at work and wonders if she could have carpal tunnel  She states that she does not like taking medications and is on a list of 15-20 supplements and vitamins that she brings along with her today  One of them is tumeric which she does feel helps joint pain  Review of Systems  Review of Systems   Constitutional: Positive for fatigue  Negative for chills, fever and unexpected weight change  HENT: Negative for mouth sores and trouble swallowing  Eyes: Negative for pain and visual disturbance  +dry eyes   Respiratory: Negative for cough and shortness of breath      Cardiovascular: Negative for chest pain and leg swelling  Gastrointestinal: Negative for abdominal pain, blood in stool, constipation, diarrhea and nausea  +esophageal stricture  +GERD   Musculoskeletal: Positive for arthralgias and myalgias  Negative for back pain and joint swelling  Skin: Negative for color change and rash  Neurological: Negative for weakness and numbness  Hematological: Negative for adenopathy  Psychiatric/Behavioral: Negative for sleep disturbance  Allergies  Allergies   Allergen Reactions    Albuterol      tachycardia    Bee Venom Anaphylaxis    Penicillins      As a toddler       Home Medications    Current Outpatient Medications:     Chromium Picolinate 200 MCG CAPS, Take by mouth, Disp: , Rfl:     Cinnamon Oil OIL, by Does not apply route, Disp: , Rfl:     cyanocobalamin (VITAMIN B-12) 1,000 mcg tablet, Take 1,000 mcg by mouth, Disp: , Rfl:     Ginkgo Biloba 40 MG TABS, Take by mouth, Disp: , Rfl:     glucosamine-chondroitin 500-400 MG tablet, Take 1 tablet by mouth 3 (three) times a day, Disp: , Rfl:     Riboflavin (B2 PO), Take by mouth, Disp: , Rfl:     TURMERIC PO, Take by mouth, Disp: , Rfl:     Vitamin D, Cholecalciferol, 400 units TABS, Take by mouth, Disp: , Rfl:     Past Medical History  Past Medical History:   Diagnosis Date    Abnormal Pap smear of cervix     Cancer ([facility] ) 12/2018    oral    Hearing loss     down 50% in right ear    Kidney stone     6 or 7 yrs ago    Lumbar herniated disc     Migraine     Neck pain     Prediabetes     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    Varicella     Wears glasses        Past Surgical History   Past Surgical History:   Procedure Laterality Date    HYSTERECTOMY  2007    OOPHORECTOMY Bilateral 2007    [place] EXCIS TONGUE LESN N/A 1/9/2019    Procedure: Partial glossectomy with FROZEN SECTION;  Surgeon:  Andrew Valle DO;  [facility];  Service: ENT    MN EXCIS TONGUE LESN Right 3/8/2019    Procedure: LATERAL TONGUE SURGICAL SITE RE-EXCISION WITH FROZEN SECTION ;  Surgeon: Shay Sykes DO;  [facility];  Service: ENT    DE REMOVAL NODES, NECK,CERV MOD RAD Bilateral 3/8/2019    Procedure: SUPRAOMOHYOID NECK DISSECTION;  Surgeon: Shay Sykes DO;  [facility];  Service: ENT    TONSILLECTOMY      TUBAL LIGATION      WISDOM TOOTH EXTRACTION         Family History  No known family history of autoimmune or inflammatory diseases  Family History   Problem Relation Age of Onset    Hypertension Mother     Diabetes Father     Hypertension Father     No Known Problems Daughter     No Known Problems Maternal Grandmother     No Known Problems Paternal Grandmother     No Known Problems Paternal Aunt     No Known Problems Paternal Aunt     Breast cancer Neg Hx     Endometrial cancer Neg Hx     Ovarian cancer Neg Hx        Social History  Occupation: psychologist   Social History     Substance and Sexual Activity   Alcohol Use No     Social History     Substance and Sexual Activity   Drug Use No     Social History     Tobacco Use   Smoking Status Never Smoker   Smokeless Tobacco Never Used       Objective:    Vitals:    01/30/20 1308   BP: 122/78   BP Location: Left arm   Patient Position: Sitting   Cuff Size: Standard   Pulse: 84   Weight: 78 kg (172 lb)   Height: 5' 2" (1 575 m)       Physical Exam   Constitutional: She appears well-developed and well-nourished  She is cooperative  No distress  HENT:   Head: Normocephalic and atraumatic  Mouth/Throat: Oropharynx is clear and moist and mucous membranes are normal    Eyes: Conjunctivae, EOM and lids are normal  No scleral icterus  Neck: Neck supple  No spinous process tenderness and no muscular tenderness present  No thyromegaly present  Cardiovascular: Normal rate, regular rhythm, S1 normal and S2 normal  Exam reveals no friction rub  No murmur heard  Pulmonary/Chest: Effort normal and breath sounds normal  No tachypnea   No respiratory distress  She has no wheezes  She has no rhonchi  She has no rales  She exhibits no tenderness  Musculoskeletal:   No joint swelling or synovitis anywhere  No fixed RA deformities  Some myofascial tenderness across the trapezius and lateral hips  No other significant myofascial tenderness  Lymphadenopathy:        Head (right side): No submental and no submandibular adenopathy present  Head (left side): No submental and no submandibular adenopathy present  She has no cervical adenopathy  Neurological: She is alert  She has normal strength  No sensory deficit  Skin: Skin is warm and dry  No rash noted  Nails show no clubbing  Psychiatric: She has a normal mood and affect  Her behavior is normal        Imaging:   DEXA 2016:  RESULTS:  Lumbar Spine: 0 964 gms/cm2  T-score: -0 8  Left femoral neck: 0 735 gms/cm2  T-score: -1 0  Using the NOF/WHO FRAX calculator, the 10 year absolute risk for  any major osteoporotic fracture is 7 5% and the risk for hip  fracture is 0 4%  VFA not indicated  IMPRESSIONS:  Fracture risk is based upon the [address] [place] [address] guidelines and the [employer] of Rheumatology glucocorticoid recommendations  using bone mineral density values and clinical risk factors  obtained from the patient's questionnaire  A 10-year absolute  risk can be calculated using these criteria  This information is  found at the NOF website ()  1  The fracture risk is LOW/MODERATE (does not meet NOF criteria  for treatment)  2  The quality of the examination is GOOD  3  No previous DXA available for comparison      Labs:   Component      Latest Ref Rng & Units 10/19/2019   Sodium      136 - 145 mmol/L 140   Potassium      3 5 - 5 3 mmol/L 4 2   Chloride      100 - 108 mmol/L 108   CO2      21 - 32 mmol/L 27   Anion Gap      4 - 13 mmol/L 5   BUN      5 - 25 mg/dL 22   Creatinine      0 60 - 1 30 mg/dL 0 77 GLUCOSE FASTING      65 - 99 mg/dL 103 (H)   Calcium      8 3 - 10 1 mg/dL 9 3   eGFR      ml/min/1 73sq m 85     Component      Latest Ref Rng & Units 2/23/2019   WBC      4 31 - 10 16 Thousand/uL 6 49   Red Blood Cell Count      3 81 - 5 12 Million/uL 4 66   Hemoglobin      11 5 - 15 4 g/dL 13 7   HCT      34 8 - 46 1 % 42 6   MCV      82 - 98 fL 91   MCH      26 8 - 34 3 pg 29 4   MCHC      31 4 - 37 4 g/dL 32 2   RDW      11 6 - 15 1 % 15 2 (H)   MPV      8 9 - 12 7 fL 11 0   Platelet Count      265 - 390 Thousands/uL 293   nRBC      /100 WBCs 0   Neutrophils %      43 - 75 % 65   Immat GRANS %      0 - 2 % 0   Lymphocytes Relative      14 - 44 % 26   Monocytes Relative      4 - 12 % 6   Eosinophils      0 - 6 % 2   Basophils Relative      0 - 1 % 1   Absolute Neutrophils      1 85 - 7 62 Thousands/µL 4 25   Immature Grans Absolute      0 00 - 0 20 Thousand/uL 0 02   Lymphocytes Absolute      0 60 - 4 47 Thousands/µL 1 68   Absolute Monocytes      0 17 - 1 22 Thousand/µL 0 39   Absolute Eosinophils      0 00 - 0 61 Thousand/µL 0 12   Basophils Absolute      0 00 - 0 10 Thousands/µL 0 03     Component      Latest Ref Rng & Units 11/24/2018   LYME AB IGG      0 00 - 0 79 0 32   LYME AB IGM      0 00 - 0 79 0 40   TSH 3RD GENERATON      0 358 - 3 740 uIU/mL 2 170

## 2020-02-18 ENCOUNTER — TELEPHONE (OUTPATIENT)
Dept: GASTROENTEROLOGY | Facility: CLINIC | Age: 60
End: 2020-02-18

## 2020-02-18 NOTE — TELEPHONE ENCOUNTER
Raman pt - Pt would like to schedule a colon/egd, pt also needs the procedure codes  Please call [phone]   Ty

## 2020-06-25 ENCOUNTER — OFFICE VISIT (OUTPATIENT)
Dept: FAMILY MEDICINE CLINIC | Facility: CLINIC | Age: 60
End: 2020-06-25

## 2020-06-25 VITALS
HEIGHT: 62 IN | WEIGHT: 174.8 LBS | OXYGEN SATURATION: 97 % | BODY MASS INDEX: 32.17 KG/M2 | DIASTOLIC BLOOD PRESSURE: 84 MMHG | HEART RATE: 79 BPM | TEMPERATURE: 97.7 F | SYSTOLIC BLOOD PRESSURE: 128 MMHG

## 2020-06-25 DIAGNOSIS — M25.50 ARTHRALGIA, UNSPECIFIED JOINT: ICD-10-CM

## 2020-06-25 DIAGNOSIS — E78.5 HYPERLIPIDEMIA, UNSPECIFIED HYPERLIPIDEMIA TYPE: Primary | ICD-10-CM

## 2020-06-25 PROCEDURE — 99213 OFFICE O/P EST LOW 20 MIN: CPT | Performed by: NURSE PRACTITIONER

## 2020-06-25 PROCEDURE — 3008F BODY MASS INDEX DOCD: CPT | Performed by: NURSE PRACTITIONER

## 2020-06-25 PROCEDURE — 1036F TOBACCO NON-USER: CPT | Performed by: NURSE PRACTITIONER

## 2020-08-29 ENCOUNTER — APPOINTMENT (OUTPATIENT)
Dept: LAB | Facility: CLINIC | Age: 60
End: 2020-08-29

## 2020-08-29 DIAGNOSIS — E78.5 HYPERLIPIDEMIA, UNSPECIFIED HYPERLIPIDEMIA TYPE: ICD-10-CM

## 2020-08-29 LAB
ALBUMIN SERPL BCP-MCNC: 3.6 G/DL (ref 3.5–5)
ALP SERPL-CCNC: 92 U/L (ref 46–116)
ALT SERPL W P-5'-P-CCNC: 30 U/L (ref 12–78)
ANION GAP SERPL CALCULATED.3IONS-SCNC: 2 MMOL/L (ref 4–13)
AST SERPL W P-5'-P-CCNC: 24 U/L (ref 5–45)
BASOPHILS # BLD AUTO: 0.03 THOUSANDS/ΜL (ref 0–0.1)
BASOPHILS NFR BLD AUTO: 0 % (ref 0–1)
BILIRUB SERPL-MCNC: 0.54 MG/DL (ref 0.2–1)
BUN SERPL-MCNC: 14 MG/DL (ref 5–25)
CALCIUM SERPL-MCNC: 8.9 MG/DL (ref 8.3–10.1)
CHLORIDE SERPL-SCNC: 109 MMOL/L (ref 100–108)
CHOLEST SERPL-MCNC: 271 MG/DL (ref 50–200)
CO2 SERPL-SCNC: 28 MMOL/L (ref 21–32)
CREAT SERPL-MCNC: 0.8 MG/DL (ref 0.6–1.3)
EOSINOPHIL # BLD AUTO: 0.4 THOUSAND/ΜL (ref 0–0.61)
EOSINOPHIL NFR BLD AUTO: 6 % (ref 0–6)
ERYTHROCYTE [DISTWIDTH] IN BLOOD BY AUTOMATED COUNT: 14.6 % (ref 11.6–15.1)
GFR SERPL CREATININE-BSD FRML MDRD: 80 ML/MIN/1.73SQ M
GLUCOSE P FAST SERPL-MCNC: 114 MG/DL (ref 65–99)
HCT VFR BLD AUTO: 44.3 % (ref 34.8–46.1)
HDLC SERPL-MCNC: 68 MG/DL
HGB BLD-MCNC: 14.2 G/DL (ref 11.5–15.4)
IMM GRANULOCYTES # BLD AUTO: 0.02 THOUSAND/UL (ref 0–0.2)
IMM GRANULOCYTES NFR BLD AUTO: 0 % (ref 0–2)
LDLC SERPL CALC-MCNC: 187 MG/DL (ref 0–100)
LYMPHOCYTES # BLD AUTO: 1.84 THOUSANDS/ΜL (ref 0.6–4.47)
LYMPHOCYTES NFR BLD AUTO: 27 % (ref 14–44)
MCH RBC QN AUTO: 29.3 PG (ref 26.8–34.3)
MCHC RBC AUTO-ENTMCNC: 32.1 G/DL (ref 31.4–37.4)
MCV RBC AUTO: 92 FL (ref 82–98)
MONOCYTES # BLD AUTO: 0.42 THOUSAND/ΜL (ref 0.17–1.22)
MONOCYTES NFR BLD AUTO: 6 % (ref 4–12)
NEUTROPHILS # BLD AUTO: 4.1 THOUSANDS/ΜL (ref 1.85–7.62)
NEUTS SEG NFR BLD AUTO: 61 % (ref 43–75)
NRBC BLD AUTO-RTO: 0 /100 WBCS
PLATELET # BLD AUTO: 318 THOUSANDS/UL (ref 149–390)
PMV BLD AUTO: 10.8 FL (ref 8.9–12.7)
POTASSIUM SERPL-SCNC: 4.4 MMOL/L (ref 3.5–5.3)
PROT SERPL-MCNC: 7.2 G/DL (ref 6.4–8.2)
RBC # BLD AUTO: 4.84 MILLION/UL (ref 3.81–5.12)
SODIUM SERPL-SCNC: 139 MMOL/L (ref 136–145)
TRIGL SERPL-MCNC: 78 MG/DL
WBC # BLD AUTO: 6.81 THOUSAND/UL (ref 4.31–10.16)

## 2020-08-29 PROCEDURE — 80061 LIPID PANEL: CPT

## 2020-08-29 PROCEDURE — 36415 COLL VENOUS BLD VENIPUNCTURE: CPT | Performed by: INTERNAL MEDICINE

## 2020-08-29 PROCEDURE — 85025 COMPLETE CBC W/AUTO DIFF WBC: CPT

## 2020-08-29 PROCEDURE — 80053 COMPREHEN METABOLIC PANEL: CPT

## 2020-12-24 ENCOUNTER — OFFICE VISIT (OUTPATIENT)
Dept: URGENT CARE | Facility: CLINIC | Age: 60
End: 2020-12-24
Payer: COMMERCIAL

## 2020-12-24 VITALS
HEART RATE: 98 BPM | TEMPERATURE: 97.4 F | RESPIRATION RATE: 18 BRPM | SYSTOLIC BLOOD PRESSURE: 100 MMHG | WEIGHT: 175 LBS | DIASTOLIC BLOOD PRESSURE: 78 MMHG | OXYGEN SATURATION: 98 % | BODY MASS INDEX: 31.01 KG/M2 | HEIGHT: 63 IN

## 2020-12-24 DIAGNOSIS — M54.50 LOW BACK PAIN WITHOUT SCIATICA, UNSPECIFIED BACK PAIN LATERALITY, UNSPECIFIED CHRONICITY: Primary | ICD-10-CM

## 2020-12-24 LAB
SL AMB  POCT GLUCOSE, UA: NEGATIVE
SL AMB LEUKOCYTE ESTERASE,UA: ABNORMAL
SL AMB POCT BILIRUBIN,UA: NEGATIVE
SL AMB POCT BLOOD,UA: NEGATIVE
SL AMB POCT CLARITY,UA: CLEAR
SL AMB POCT COLOR,UA: YELLOW
SL AMB POCT KETONES,UA: NEGATIVE
SL AMB POCT NITRITE,UA: NEGATIVE
SL AMB POCT PH,UA: 6.5
SL AMB POCT SPECIFIC GRAVITY,UA: 1
SL AMB POCT URINE PROTEIN: NEGATIVE
SL AMB POCT UROBILINOGEN: 0.2

## 2020-12-24 PROCEDURE — 81002 URINALYSIS NONAUTO W/O SCOPE: CPT | Performed by: PHYSICIAN ASSISTANT

## 2020-12-24 PROCEDURE — G0382 LEV 3 HOSP TYPE B ED VISIT: HCPCS | Performed by: PHYSICIAN ASSISTANT

## 2020-12-24 PROCEDURE — 87086 URINE CULTURE/COLONY COUNT: CPT | Performed by: PHYSICIAN ASSISTANT

## 2020-12-24 RX ORDER — METHOCARBAMOL 500 MG/1
500 TABLET, FILM COATED ORAL 4 TIMES DAILY
Qty: 40 TABLET | Refills: 0 | Status: SHIPPED | OUTPATIENT
Start: 2020-12-24

## 2020-12-24 RX ORDER — SULFAMETHOXAZOLE AND TRIMETHOPRIM 800; 160 MG/1; MG/1
1 TABLET ORAL EVERY 12 HOURS SCHEDULED
Qty: 10 TABLET | Refills: 0 | Status: SHIPPED | OUTPATIENT
Start: 2020-12-24 | End: 2020-12-29

## 2020-12-25 LAB — BACTERIA UR CULT: NORMAL

## 2020-12-31 ENCOUNTER — OFFICE VISIT (OUTPATIENT)
Dept: FAMILY MEDICINE CLINIC | Facility: CLINIC | Age: 60
End: 2020-12-31

## 2020-12-31 VITALS
WEIGHT: 176.2 LBS | HEART RATE: 79 BPM | BODY MASS INDEX: 31.22 KG/M2 | OXYGEN SATURATION: 100 % | TEMPERATURE: 97.5 F | HEIGHT: 63 IN | DIASTOLIC BLOOD PRESSURE: 74 MMHG | SYSTOLIC BLOOD PRESSURE: 120 MMHG

## 2020-12-31 DIAGNOSIS — K22.2 ESOPHAGEAL STRICTURE: ICD-10-CM

## 2020-12-31 DIAGNOSIS — R35.0 URINARY FREQUENCY: Primary | ICD-10-CM

## 2020-12-31 DIAGNOSIS — M72.2 PLANTAR FASCIITIS: ICD-10-CM

## 2020-12-31 PROBLEM — M25.511 ACUTE PAIN OF RIGHT SHOULDER: Status: RESOLVED | Noted: 2019-10-03 | Resolved: 2020-12-31

## 2020-12-31 LAB
SL AMB  POCT GLUCOSE, UA: NORMAL
SL AMB LEUKOCYTE ESTERASE,UA: NORMAL
SL AMB POCT BILIRUBIN,UA: NORMAL
SL AMB POCT BLOOD,UA: NORMAL
SL AMB POCT CLARITY,UA: CLEAR
SL AMB POCT COLOR,UA: YELLOW
SL AMB POCT KETONES,UA: NORMAL
SL AMB POCT NITRITE,UA: NORMAL
SL AMB POCT PH,UA: 5.5
SL AMB POCT SPECIFIC GRAVITY,UA: 1.03
SL AMB POCT URINE PROTEIN: NORMAL
SL AMB POCT UROBILINOGEN: 0.2

## 2020-12-31 PROCEDURE — 99213 OFFICE O/P EST LOW 20 MIN: CPT | Performed by: NURSE PRACTITIONER

## 2021-01-05 ENCOUNTER — TRANSCRIBE ORDERS (OUTPATIENT)
Dept: ADMINISTRATIVE | Facility: HOSPITAL | Age: 61
End: 2021-01-05

## 2021-01-05 DIAGNOSIS — Z12.31 ENCOUNTER FOR SCREENING MAMMOGRAM FOR MALIGNANT NEOPLASM OF BREAST: Primary | ICD-10-CM

## 2021-05-06 ENCOUNTER — HOSPITAL ENCOUNTER (OUTPATIENT)
Dept: MAMMOGRAPHY | Facility: CLINIC | Age: 61
Discharge: HOME | End: 2021-05-06

## 2021-05-06 VITALS — HEIGHT: 63 IN | WEIGHT: 176 LBS | BODY MASS INDEX: 31.18 KG/M2

## 2021-05-06 DIAGNOSIS — Z12.31 ENCOUNTER FOR SCREENING MAMMOGRAM FOR MALIGNANT NEOPLASM OF BREAST: ICD-10-CM

## 2021-05-06 PROCEDURE — 77067 SCR MAMMO BI INCL CAD: CPT

## 2021-05-06 PROCEDURE — 77063 BREAST TOMOSYNTHESIS BI: CPT

## 2021-08-03 ENCOUNTER — OFFICE VISIT (OUTPATIENT)
Dept: URGENT CARE | Facility: CLINIC | Age: 61
End: 2021-08-03
Payer: COMMERCIAL

## 2021-08-03 VITALS
HEART RATE: 72 BPM | SYSTOLIC BLOOD PRESSURE: 122 MMHG | WEIGHT: 177 LBS | TEMPERATURE: 97.5 F | RESPIRATION RATE: 16 BRPM | OXYGEN SATURATION: 98 % | BODY MASS INDEX: 31.35 KG/M2 | DIASTOLIC BLOOD PRESSURE: 92 MMHG

## 2021-08-03 DIAGNOSIS — R10.9 ABDOMINAL PAIN, UNSPECIFIED ABDOMINAL LOCATION: Primary | ICD-10-CM

## 2021-08-03 PROCEDURE — G0382 LEV 3 HOSP TYPE B ED VISIT: HCPCS | Performed by: PHYSICIAN ASSISTANT

## 2021-08-03 NOTE — PROGRESS NOTES
[facility]        NAME: Maryjane Veras is a 64 y o  female  : 1960    MRN: [id number]  DATE: August 3, 2021  TIME: 1:36 PM    Assessment and Plan   Abdominal pain, unspecified abdominal location [R10 9]  1  Abdominal pain, unspecified abdominal location           Patient Instructions   Patient Instructions   Abdominal Pain   WHAT YOU NEED TO KNOW:   Abdominal pain can be dull, achy, or sharp  You may have pain in one area of your abdomen, or in your entire abdomen  Your pain may be caused by a condition such as constipation, food sensitivity or poisoning, infection, or a blockage  Abdominal pain can also be from a hernia, appendicitis, or an ulcer  Liver, gallbladder, or kidney conditions can also cause abdominal pain  The cause of your abdominal pain may be unknown  DISCHARGE INSTRUCTIONS:   Return to the emergency department if:   · You have new chest pain or shortness of breath  · You have pulsing pain in your upper abdomen or lower back that suddenly becomes constant  · Your pain is in the right lower abdominal area and worsens with movement  · You have a fever over 100 4°F (38°C) or shaking chills  · You are vomiting and cannot keep food or liquids down  · Your pain does not improve or gets worse over the next 8 to 12 hours  · You see blood in your vomit or bowel movements, or they look black and tarry  · Your skin or the whites of your eyes turn yellow  · You are a woman and have a large amount of vaginal bleeding that is not your monthly period  Contact your healthcare provider if:   · You have pain in your lower back  · You are a man and have pain in your testicles  · You have pain when you urinate  · You have questions or concerns about your condition or care  Follow up with your healthcare provider within 24 hours or as directed:  Write down your questions so you remember to ask them during your visits     Medicines:   · Medicines  may be given to calm your stomach and prevent vomiting or to decrease pain  Ask how to take pain medicine safely  · Take your medicine as directed  Contact your healthcare provider if you think your medicine is not helping or if you have side effects  Tell him of her if you are allergic to any medicine  Keep a list of the medicines, vitamins, and herbs you take  Include the amounts, and when and why you take them  Bring the list or the pill bottles to follow-up visits  Carry your medicine list with you in case of an emergency  © Copyright [employer] 2021 Information is for End User's use only and may not be sold, redistributed or otherwise used for commercial purposes  All illustrations and images included in CareNotes® are the copyrighted property of A D A M , Inc  or [facility] [place]   The above information is an  only  It is not intended as medical advice for individual conditions or treatments  Talk to your doctor, nurse or pharmacist before following any medical regimen to see if it is safe and effective for you  Follow up with PCP in 3-5 days  Proceed to  ER if symptoms worsen  Chief Complaint     Chief Complaint   Patient presents with    Abdominal Pain     b/l mid epigastric pain x 3 days, also c/o occ mucus in her stool  Pain is on and off, feels tight  History of Present Illness       Patient presents with abdominal pain on and off in different places for the past 3-4 days  Pain most often is in the epigastric area  Sometimes gets RUQ and LUQ pain as well  Pain on the Right/LUQ are a "grinding pain" the epigastric area feels like a pressure/tightness when it occurs  At its worst the abdominal pain got to an 8/10  Pain will last just a couple minutes when it occurs  Currently has no pain or symptoms  Did have some GERD symptoms and burning up the esophagus  Not affected by eating or drinking  Denies anything that starts or worsens the symptoms   Noticed some mucous in the stools yesterday  Recently diet has changed and she has been eating "bad" food that is sugary/fried and unhealthy  Originally started after eating [employer]  Symptoms improved today compared to last night  Did take a gas ex last night which improved symptoms a lot  Denies lower abdominal pains, vomiting, nausea, blood in stool, melena, fevers, SOB, squeezing chest pains  Denies history of anything similar occurring before  Review of Systems   Review of Systems   Constitutional: Negative for fever  Respiratory: Negative for shortness of breath  Cardiovascular: Negative for chest pain and palpitations  Gastrointestinal: Positive for abdominal pain  Negative for blood in stool, constipation, diarrhea, nausea and vomiting  Neurological: Negative for weakness  Psychiatric/Behavioral: Negative for confusion           Current Medications       Current Outpatient Medications:     Aloe Vera 25 MG CAPS, Take 50 mg by mouth, Disp: , Rfl:     aspirin (ECOTRIN LOW STRENGTH) 81 mg EC tablet, Take 81 mg by mouth daily, Disp: , Rfl:     B COMPLEX-BIOTIN-FA ER PO, Take by mouth, Disp: , Rfl:     Calcium Citrate 200 MG TABS, Take 400 mg by mouth, Disp: , Rfl:     Chromium Picolinate 200 MCG CAPS, Take by mouth, Disp: , Rfl:     Cinnamon Oil OIL, by Does not apply route, Disp: , Rfl:     cyanocobalamin (VITAMIN B-12) 1,000 mcg tablet, Take 1,000 mcg by mouth, Disp: , Rfl:     Ginkgo Biloba 40 MG TABS, Take by mouth, Disp: , Rfl:     glucosamine-chondroitin 500-400 MG tablet, Take 1 tablet by mouth 3 (three) times a day, Disp: , Rfl:     methocarbamol (ROBAXIN) 500 mg tablet, Take 1 tablet (500 mg total) by mouth 4 (four) times a day, Disp: 40 tablet, Rfl: 0    NON FORMULARY, , Disp: , Rfl:     nystatin (MYCOSTATIN) 500,000 units/5 mL suspension, Apply 5 mL (500,000 Units total) to the mouth or throat 4 (four) times a day, Disp: 280 mL, Rfl: 1    patient supplied medication, , Disp: , Rfl:     patient supplied medication, , Disp: , Rfl:     patient supplied medication, , Disp: , Rfl:     Riboflavin (B2 PO), Take by mouth, Disp: , Rfl:     TURMERIC PO, Take by mouth, Disp: , Rfl:     Vitamin D, Cholecalciferol, 400 units TABS, Take by mouth, Disp: , Rfl:     Current Allergies     Allergies as of 08/03/2021 - Reviewed 08/03/2021   Allergen Reaction Noted    Albuterol  11/15/2018    Bee venom Anaphylaxis 05/07/2018    Penicillins  11/15/2018            The following portions of the patient's history were reviewed and updated as appropriate: allergies, current medications, past family history, past medical history, past social history, past surgical history and problem list      Past Medical History:   Diagnosis Date    Abnormal Pap smear of cervix     Cancer ([facility] [address] ) 12/2018    oral    Hearing loss     down 50% in right ear    Kidney stone     6 or 7 yrs ago    Lumbar herniated disc     Migraine     Neck pain     Prediabetes     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[facility] [address] ) 3/8/2019    Varicella     Wears glasses        Past Surgical History:   Procedure Laterality Date    HYSTERECTOMY  2007    OOPHORECTOMY Bilateral 2007    CT EXCIS TONGUE LESN N/A 1/9/2019    Procedure: Partial glossectomy with FROZEN SECTION;  Surgeon: Munira Woodall DO;  Location: [facility];  Service: ENT    CT EXCIS TONGUE LESN Right 3/8/2019    Procedure: LATERAL TONGUE SURGICAL SITE RE-EXCISION WITH FROZEN SECTION ;  Surgeon: Munira Woodall DO;  Location[facility];  Service: ENT    CT REMOVAL NODES, NECK,CERV MOD RAD Bilateral 3/8/2019    Procedure: SUPRAOMOHYOID NECK DISSECTION;  Surgeon:  Munira Woodall DO;  Location: [facility];  Service: ENT    TONSILLECTOMY      TUBAL LIGATION      WISDOM TOOTH EXTRACTION         Family History   Problem Relation Age of Onset    Hypertension Mother     Diabetes Father     Hypertension Father     No Known Problems Daughter     No Known Problems Maternal Grandmother     No Known Problems Paternal Grandmother     No Known Problems Paternal Aunt     No Known Problems Paternal Aunt     Breast cancer Neg Hx     Endometrial cancer Neg Hx     Ovarian cancer Neg Hx          Medications have been verified  Objective   /92   Pulse 72   Temp 97 5 °F (36 4 °C) (Temporal)   Resp 16   Wt 80 3 kg (177 lb)   SpO2 98%   BMI 31 35 kg/m²        Physical Exam     Physical Exam  Constitutional:       Appearance: She is well-developed  Abdominal:      General: Abdomen is flat  Bowel sounds are normal       Palpations: Abdomen is soft  Tenderness: There is no abdominal tenderness  There is no right CVA tenderness, left CVA tenderness, guarding or rebound  Negative signs include Mendoza's sign, Rovsing's sign and McBurney's sign  Skin:     General: Skin is warm  Neurological:      Mental Status: She is alert     Psychiatric:         Mood and Affect: Mood normal          Behavior: Behavior normal

## 2021-08-03 NOTE — PATIENT INSTRUCTIONS
Abdominal Pain   WHAT YOU NEED TO KNOW:   Abdominal pain can be dull, achy, or sharp  You may have pain in one area of your abdomen, or in your entire abdomen  Your pain may be caused by a condition such as constipation, food sensitivity or poisoning, infection, or a blockage  Abdominal pain can also be from a hernia, appendicitis, or an ulcer  Liver, gallbladder, or kidney conditions can also cause abdominal pain  The cause of your abdominal pain may be unknown  DISCHARGE INSTRUCTIONS:   Return to the emergency department if:   · You have new chest pain or shortness of breath  · You have pulsing pain in your upper abdomen or lower back that suddenly becomes constant  · Your pain is in the right lower abdominal area and worsens with movement  · You have a fever over 100 4°F (38°C) or shaking chills  · You are vomiting and cannot keep food or liquids down  · Your pain does not improve or gets worse over the next 8 to 12 hours  · You see blood in your vomit or bowel movements, or they look black and tarry  · Your skin or the whites of your eyes turn yellow  · You are a woman and have a large amount of vaginal bleeding that is not your monthly period  Contact your healthcare provider if:   · You have pain in your lower back  · You are a man and have pain in your testicles  · You have pain when you urinate  · You have questions or concerns about your condition or care  Follow up with your healthcare provider within 24 hours or as directed:  Write down your questions so you remember to ask them during your visits  Medicines:   · Medicines  may be given to calm your stomach and prevent vomiting or to decrease pain  Ask how to take pain medicine safely  · Take your medicine as directed  Contact your healthcare provider if you think your medicine is not helping or if you have side effects  Tell him of her if you are allergic to any medicine   Keep a list of the medicines, vitamins, and herbs you take  Include the amounts, and when and why you take them  Bring the list or the pill bottles to follow-up visits  Carry your medicine list with you in case of an emergency  © Copyright [employer] 2021 Information is for End User's use only and may not be sold, redistributed or otherwise used for commercial purposes  All illustrations and images included in CareNotes® are the copyrighted property of [employer]  or Arsalan Valencia   The above information is an  only  It is not intended as medical advice for individual conditions or treatments  Talk to your doctor, nurse or pharmacist before following any medical regimen to see if it is safe and effective for you

## 2021-09-23 ENCOUNTER — APPOINTMENT (OUTPATIENT)
Dept: RADIOLOGY | Facility: CLINIC | Age: 61
End: 2021-09-23

## 2021-09-23 ENCOUNTER — OFFICE VISIT (OUTPATIENT)
Dept: FAMILY MEDICINE CLINIC | Facility: CLINIC | Age: 61
End: 2021-09-23

## 2021-09-23 VITALS
BODY MASS INDEX: 31.57 KG/M2 | SYSTOLIC BLOOD PRESSURE: 120 MMHG | HEIGHT: 63 IN | TEMPERATURE: 97.6 F | DIASTOLIC BLOOD PRESSURE: 88 MMHG | HEART RATE: 88 BPM | OXYGEN SATURATION: 98 % | WEIGHT: 178.2 LBS

## 2021-09-23 DIAGNOSIS — H92.03 EAR PAIN, BILATERAL: Primary | ICD-10-CM

## 2021-09-23 DIAGNOSIS — M25.522 LEFT ELBOW PAIN: ICD-10-CM

## 2021-09-23 PROCEDURE — 73070 X-RAY EXAM OF ELBOW: CPT

## 2021-09-23 PROCEDURE — 99213 OFFICE O/P EST LOW 20 MIN: CPT | Performed by: PHYSICIAN ASSISTANT

## 2021-09-23 RX ORDER — MULTIVIT WITH MINERALS/LUTEIN
250 TABLET ORAL DAILY
COMMUNITY

## 2021-09-23 NOTE — PROGRESS NOTES
Assessment/Plan:       Problem List Items Addressed This Visit     None      Visit Diagnoses     Ear pain, bilateral    -  Primary    Left elbow pain        Relevant Orders    XR elbow 2 vw left        recommend daily flonase for ear pain/pressure/full sensation, trace effusions on exam, tylenol for pain  If pain worsens or drainage recurs follow up  If hearing does not return to baseline can refer to ENT  Point tenderness at tip of olecranon process, will XR  Recommend padding, OTC pain relievers, ice  No evidence of bursitis  Subjective:      Patient ID: Riley Valle is a 64 y o  female  Pt presents with 4 weeks of bilateral ear pain  Intermittent  Sometimes sharp  L ear feels "full"  Pain improved over the last 2 weeks  R ear was "wet" on her pillow a few weeks ago  She used peroxide  No fevers, chills  L ear hearing is muffled  No sore throat, HA, congestion, cough or other URI sx  Additionally, she has significant tenderness to the L elbow over the olecranon  Small area of point tenderness  No swelling, bruising skin changes or injuries  Hurts when she rests her arm down on something hard  The following portions of the patient's history were reviewed and updated as appropriate:   She  has a past medical history of Abnormal Pap smear of cervix, Cancer ([address] ) (12/2018), Hearing loss, Kidney stone, Lumbar herniated disc, Migraine, Neck pain, Prediabetes, 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[facility] [address] ) (3/8/2019), Varicella, and Wears glasses    She   Patient Active Problem List    Diagnosis Date Noted    Urinary frequency 12/31/2020    Encntr for gyn exam (general) (routine) w/o abn findings 10/24/2019    Gastroesophageal reflux disease without esophagitis 10/03/2019    Plantar fasciitis 10/03/2019    Hyperlipidemia 10/03/2019    Normal gynecologic examination 10/03/2019    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HCC) 03/08/2019    Migraine with aura and without status migrainosus, not intractable 02/07/2019    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 12/27/2018    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    Bilateral hearing loss 11/15/2018    Chronic fatigue 11/15/2018    Arthralgia 11/15/2018    Esophageal stricture 11/15/2018    Dry eyes 11/15/2018    Mouth dryness 11/15/2018    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    Calculus of right kidney 10/15/2015     She  has a past surgical history that includes Tonsillectomy; Tubal ligation; [place] tooth extraction; pr excis tongue lesn (N/A, 1/9/2019); pr removal nodes, neck,cerv mod rad (Bilateral, 3/8/2019); pr excis tongue lesn (Right, 3/8/2019); Hysterectomy (2007); and Oophorectomy (Bilateral, 2007)  Her family history includes Diabetes in her father; Hypertension in her father and mother; No Known Problems in her daughter, maternal grandmother, paternal aunt, paternal aunt, and paternal grandmother  She  reports that she has never smoked  She has never used smokeless tobacco  She reports that she does not drink alcohol and does not use drugs    Current Outpatient Medications   Medication Sig Dispense Refill    ascorbic acid (VITAMIN C) 250 mg tablet Take 250 mg by mouth daily      aspirin (ECOTRIN LOW STRENGTH) 81 mg EC tablet Take 81 mg by mouth daily      Vitamin D, Cholecalciferol, 400 units TABS Take by mouth      Zinc Sulfate (ZINC 15 PO) Take by mouth      Aloe Vera 25 MG CAPS Take 50 mg by mouth (Patient not taking: Reported on 9/23/2021)      B COMPLEX-BIOTIN-FA ER PO Take by mouth (Patient not taking: Reported on 9/23/2021)      Calcium Citrate 200 MG TABS Take 400 mg by mouth (Patient not taking: Reported on 9/23/2021)      Chromium Picolinate 200 MCG CAPS Take by mouth (Patient not taking: Reported on 9/23/2021)      Cinnamon Oil OIL by Does not apply route (Patient not taking: Reported on 9/23/2021)      cyanocobalamin (VITAMIN B-12) 1,000 mcg tablet Take 1,000 mcg by mouth (Patient not taking: Reported on 9/23/2021)      Ginkgo Biloba 40 MG TABS Take by mouth (Patient not taking: Reported on 9/23/2021)      glucosamine-chondroitin 500-400 MG tablet Take 1 tablet by mouth 3 (three) times a day (Patient not taking: Reported on 9/23/2021)      methocarbamol (ROBAXIN) 500 mg tablet Take 1 tablet (500 mg total) by mouth 4 (four) times a day (Patient not taking: Reported on 9/23/2021) 40 tablet 0    NON FORMULARY  (Patient not taking: Reported on 9/23/2021)      nystatin (MYCOSTATIN) 500,000 units/5 mL suspension Apply 5 mL (500,000 Units total) to the mouth or throat 4 (four) times a day (Patient not taking: Reported on 9/23/2021) 280 mL 1    patient supplied medication  (Patient not taking: Reported on 9/23/2021)      patient supplied medication  (Patient not taking: Reported on 9/23/2021)      patient supplied medication  (Patient not taking: Reported on 9/23/2021)      Riboflavin (B2 PO) Take by mouth (Patient not taking: Reported on 9/23/2021)      TURMERIC PO Take by mouth (Patient not taking: Reported on 9/23/2021)       No current facility-administered medications for this visit       Current Outpatient Medications on File Prior to Visit   Medication Sig    ascorbic acid (VITAMIN C) 250 mg tablet Take 250 mg by mouth daily    aspirin (ECOTRIN LOW STRENGTH) 81 mg EC tablet Take 81 mg by mouth daily    Vitamin D, Cholecalciferol, 400 units TABS Take by mouth    Zinc Sulfate (ZINC 15 PO) Take by mouth    Aloe Vera 25 MG CAPS Take 50 mg by mouth (Patient not taking: Reported on 9/23/2021)    B COMPLEX-BIOTIN-FA ER PO Take by mouth (Patient not taking: Reported on 9/23/2021)    Calcium Citrate 200 MG TABS Take 400 mg by mouth (Patient not taking: Reported on 9/23/2021)    Chromium Picolinate 200 MCG CAPS Take by mouth (Patient not taking: Reported on 9/23/2021)    Cinnamon Oil OIL by Does not apply route (Patient not taking: Reported on 9/23/2021)    cyanocobalamin (VITAMIN B-12) 1,000 mcg tablet Take 1,000 mcg by mouth (Patient not taking: Reported on 9/23/2021)    Ginkgo Biloba 40 MG TABS Take by mouth (Patient not taking: Reported on 9/23/2021)    glucosamine-chondroitin 500-400 MG tablet Take 1 tablet by mouth 3 (three) times a day (Patient not taking: Reported on 9/23/2021)    methocarbamol (ROBAXIN) 500 mg tablet Take 1 tablet (500 mg total) by mouth 4 (four) times a day (Patient not taking: Reported on 9/23/2021)   [address]   (Patient not taking: Reported on 9/23/2021)    nystatin (MYCOSTATIN) 500,000 units/5 mL suspension Apply 5 mL (500,000 Units total) to the mouth or throat 4 (four) times a day (Patient not taking: Reported on 9/23/2021)    patient supplied medication  (Patient not taking: Reported on 9/23/2021)    patient supplied medication  (Patient not taking: Reported on 9/23/2021)    patient supplied medication  (Patient not taking: Reported on 9/23/2021)    Riboflavin (B2 PO) Take by mouth (Patient not taking: Reported on 9/23/2021)    TURMERIC PO Take by mouth (Patient not taking: Reported on 9/23/2021)     No current facility-administered medications on file prior to visit  She is allergic to albuterol, bee venom, and penicillins       Review of Systems   Constitutional: Negative for chills, fatigue and fever  HENT: Positive for ear discharge and ear pain  Negative for congestion, hearing loss, nosebleeds, postnasal drip, rhinorrhea, sinus pressure, sinus pain, sneezing and sore throat  Eyes: Negative for pain, discharge, itching and visual disturbance  Respiratory: Negative for cough, chest tightness, shortness of breath and wheezing  Cardiovascular: Negative for chest pain, palpitations and leg swelling  Gastrointestinal: Negative for abdominal pain, blood in stool, constipation, diarrhea, nausea and vomiting  Genitourinary: Negative for frequency and urgency  Musculoskeletal: Positive for arthralgias     Neurological: Negative for dizziness, light-headedness and numbness  Objective:      /88 (BP Location: Left arm, Patient Position: Sitting)   Pulse 88   Temp 97 6 °F (36 4 °C) (Temporal)   Ht 5' 3" (1 6 m)   Wt 80 8 kg (178 lb 3 2 oz)   SpO2 98%   BMI 31 57 kg/m²          Physical Exam  Vitals and nursing note reviewed  Constitutional:       General: She is not in acute distress  Appearance: Normal appearance  HENT:      Head: Normocephalic and atraumatic  Right Ear: Ear canal and external ear normal  No drainage or swelling  A middle ear effusion is present  Left Ear: Ear canal and external ear normal  No drainage or swelling  A middle ear effusion is present  Ears:      Comments: Trace effusions bilat  Mild dry debris in the R EAC, possibly indicating recently resolved otitis externa  Normal L EAC  No evidence of AOM or large mid ear effusion      Nose: Nose normal       Mouth/Throat:      Mouth: Mucous membranes are moist       Pharynx: Oropharynx is clear  No oropharyngeal exudate or posterior oropharyngeal erythema  Eyes:      Conjunctiva/sclera: Conjunctivae normal       Pupils: Pupils are equal, round, and reactive to light  Pulmonary:      Effort: Pulmonary effort is normal  No respiratory distress  Musculoskeletal:         General: Normal range of motion  Right elbow: Normal       Left elbow: No swelling or deformity  Normal range of motion  Tenderness present in olecranon process  Cervical back: Normal range of motion and neck supple  Lymphadenopathy:      Cervical: No cervical adenopathy  Skin:     General: Skin is warm and dry  Neurological:      Mental Status: She is alert and oriented to person, place, and time     Psychiatric:         Mood and Affect: Mood and affect normal

## 2021-09-30 DIAGNOSIS — M25.522 LEFT ELBOW PAIN: Primary | ICD-10-CM

## 2022-03-29 NOTE — PROGRESS NOTES
Assessment/Plan:    No problem-specific Assessment & Plan notes found for this encounter  Diagnoses and all orders for this visit:    Gastroesophageal reflux disease without esophagitis  Comments:  Patient has upcoming appointment for her EGD secondary to recurrent troubles with swallowing     Acute pain of right shoulder  Comments:  ordered flexeril 5mg, pain is muscular in nature   Orders:  -     cyclobenzaprine (FLEXERIL) 5 mg tablet; Take 1 tablet (5 mg total) by mouth 3 (three) times a day as needed for muscle spasms for up to 30 doses    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HCC)  Comments:  following with ENT     Plantar fasciitis  Comments:  DW patient strategies to help     Hyperlipidemia, unspecified hyperlipidemia type    Other orders  -     TURMERIC PO; Take by mouth          Subjective:      Patient ID: Maurizio Barr is a 61 y o  female  Patient here today for her check up  Patient is following with ENT Dr Donna Diaz and is doing well and has follow up in 2/2020 following for her cancer  Patient also has an upcoming appointment with GI and plans for endoscopy and colonoscopy and following with Dr Harman Mcintyre  Patient is having an endoscopy has a history of a Schatzki ring and has had dilations in the past x4 and none recently  Patient reports that she is having problems again with swallowing foods  Patient saw her GYN and exam was normal  Patient reports that she is having problems with her shoulder and neck and is feeling tightness and pulling on the side of her neck  Patient is also reporting she is a carrier for hemochromatosis        The following portions of the patient's history were reviewed and updated as appropriate:   She  has a past medical history of Abnormal Pap smear of cervix, Cancer ([facility] [address] ) (12/2018), Hearing loss, Kidney stone, Lumbar herniated disc, Migraine, Neck pain, Prediabetes, 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[facility] [address] ) (3/8/2019), Varicella, and Wears glasses    She   The patient is seen today, after a lengthy absence, for follow-up regarding suspected IBS and a history of rectal bleeding.Her tTG IgA antibody titer was normal/negative in 2/2016 - previous trials of SL Levsin and Bentyl did not help with her discomfort - she noted the best relief with taking a Turmeric supplement. . brA flexible sigmoidoscopy to 20cm in 5/2016 (secondary to rectal bleeding) was only remarkable for mild internal hemorrhoids.
br
br  She returns today secondary to ongoing issues with postprandial fecal urgency - especially in the morning or if she leaves her house. She denies any hematochezia, emesis, fever or unexplained weight loss. There is no known family history of CRC but her father was found to have polyps in his 50s that required surgical removal.   Patient Active Problem List    Diagnosis Date Noted    Encntr for gyn exam (general) (routine) w/o abn findings 10/24/2019    Gastroesophageal reflux disease without esophagitis 10/03/2019    Acute pain of right shoulder 10/03/2019    Plantar fasciitis 10/03/2019    Hyperlipidemia 10/03/2019    Normal gynecologic examination 10/03/2019    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[address] ) 03/08/2019    Migraine with aura and without status migrainosus, not intractable 02/07/2019    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 12/27/2018    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    Bilateral hearing loss 11/15/2018    Chronic fatigue 11/15/2018    Arthralgia 11/15/2018    Esophageal stricture 11/15/2018    Dry eyes 11/15/2018    Mouth dryness 11/15/2018    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    Calculus of right kidney 10/15/2015     She  has a past surgical history that includes Tonsillectomy; Tubal ligation; [place] tooth extraction; pr excis tongue lesn (N/A, 1/9/2019); pr removal nodes, neck,cerv mod rad (Bilateral, 3/8/2019); pr excis tongue lesn (Right, 3/8/2019); Hysterectomy (2007); and Oophorectomy (Bilateral, 2007)  Her family history includes Diabetes in her father; Hypertension in her father and mother; No Known Problems in her daughter, maternal grandmother, paternal aunt, paternal aunt, and paternal grandmother  She  reports that she has never smoked  She has never used smokeless tobacco  She reports that she does not drink alcohol or use drugs  She is allergic to albuterol; bee venom; and penicillins       Review of Systems   Constitutional: Negative for activity change, appetite change, chills, diaphoresis, fatigue, fever and unexpected weight change  HENT: Negative for congestion, ear pain, hearing loss, postnasal drip, sinus pressure, sinus pain, sneezing and sore throat  Eyes: Negative for pain, redness and visual disturbance     Respiratory: Negative for cough and shortness of breath  Cardiovascular: Negative for chest pain and leg swelling  Gastrointestinal: Negative for abdominal pain, diarrhea, nausea and vomiting  Endocrine: Negative  Genitourinary: Negative  Musculoskeletal: Positive for arthralgias  Allergic/Immunologic: Negative  Neurological: Negative for dizziness and light-headedness  Hematological: Negative  Psychiatric/Behavioral: Negative for behavioral problems and dysphoric mood  Objective:      /74   Pulse 90   Temp 97 9 °F (36 6 °C) (Tympanic)   Ht 5' 2" (1 575 m)   Wt 78 7 kg (173 lb 6 4 oz)   SpO2 98%   BMI 31 72 kg/m²          Physical Exam   Constitutional: She is oriented to person, place, and time  Vital signs are normal  She appears well-developed and well-nourished  No distress  HENT:   Head: Normocephalic and atraumatic  Eyes: Pupils are equal, round, and reactive to light  Neck: Normal range of motion  No thyromegaly present  Cardiovascular: Normal rate, regular rhythm, normal heart sounds and intact distal pulses  No murmur heard  Pulmonary/Chest: Effort normal and breath sounds normal  No respiratory distress  She has no wheezes  Abdominal: Soft  Bowel sounds are normal    Musculoskeletal: Normal range of motion  Neurological: She is alert and oriented to person, place, and time  Skin: Skin is warm and dry  Psychiatric: She has a normal mood and affect  Nursing note and vitals reviewed

## 2022-05-04 ENCOUNTER — OFFICE VISIT (OUTPATIENT)
Dept: URGENT CARE | Facility: CLINIC | Age: 62
End: 2022-05-04

## 2022-05-04 VITALS
RESPIRATION RATE: 16 BRPM | DIASTOLIC BLOOD PRESSURE: 74 MMHG | HEART RATE: 71 BPM | OXYGEN SATURATION: 99 % | TEMPERATURE: 97.5 F | SYSTOLIC BLOOD PRESSURE: 159 MMHG

## 2022-05-04 DIAGNOSIS — H60.501 ACUTE OTITIS EXTERNA OF RIGHT EAR, UNSPECIFIED TYPE: ICD-10-CM

## 2022-05-04 DIAGNOSIS — H93.8X2 EAR CONGESTION, LEFT: Primary | ICD-10-CM

## 2022-05-04 PROCEDURE — G0382 LEV 3 HOSP TYPE B ED VISIT: HCPCS | Performed by: PHYSICIAN ASSISTANT

## 2022-05-04 RX ORDER — FLUTICASONE PROPIONATE 50 MCG
1 SPRAY, SUSPENSION (ML) NASAL DAILY
Qty: 11.1 ML | Refills: 0 | Status: SHIPPED | OUTPATIENT
Start: 2022-05-04 | End: 2022-07-25 | Stop reason: SDUPTHER

## 2022-07-25 ENCOUNTER — OFFICE VISIT (OUTPATIENT)
Dept: FAMILY MEDICINE CLINIC | Facility: CLINIC | Age: 62
End: 2022-07-25

## 2022-07-25 VITALS
HEIGHT: 63 IN | OXYGEN SATURATION: 99 % | BODY MASS INDEX: 32.25 KG/M2 | HEART RATE: 69 BPM | TEMPERATURE: 98.1 F | DIASTOLIC BLOOD PRESSURE: 90 MMHG | SYSTOLIC BLOOD PRESSURE: 130 MMHG | WEIGHT: 182 LBS

## 2022-07-25 DIAGNOSIS — Z78.0 POSTMENOPAUSAL ESTROGEN DEFICIENCY: ICD-10-CM

## 2022-07-25 DIAGNOSIS — H93.8X2 EAR CONGESTION, LEFT: ICD-10-CM

## 2022-07-25 DIAGNOSIS — H65.01 RIGHT ACUTE SEROUS OTITIS MEDIA, RECURRENCE NOT SPECIFIED: Primary | ICD-10-CM

## 2022-07-25 PROCEDURE — 99213 OFFICE O/P EST LOW 20 MIN: CPT | Performed by: PHYSICIAN ASSISTANT

## 2022-07-25 RX ORDER — LEVOFLOXACIN 500 MG/1
500 TABLET, FILM COATED ORAL EVERY 24 HOURS
Qty: 7 TABLET | Refills: 0 | Status: SHIPPED | OUTPATIENT
Start: 2022-07-25 | End: 2022-08-01

## 2022-07-25 RX ORDER — FLUTICASONE PROPIONATE 50 MCG
1 SPRAY, SUSPENSION (ML) NASAL DAILY
Qty: 16 ML | Refills: 1 | Status: SHIPPED | OUTPATIENT
Start: 2022-07-25 | End: 2022-09-22

## 2022-07-25 NOTE — PROGRESS NOTES
Assessment/Plan:    No problem-specific Assessment & Plan notes found for this encounter  Diagnoses and all orders for this visit:    Right acute serous otitis media, recurrence not specified  -     levofloxacin (LEVAQUIN) 500 mg tablet; Take 1 tablet (500 mg total) by mouth every 24 hours for 7 days    Postmenopausal estrogen deficiency  -     DXA bone density spine hip and pelvis; Future    Ear congestion, left  -     fluticasone (FLONASE) 50 mcg/act nasal spray; 1 spray into each nostril daily        Subjective:      Patient ID: Sheridan Odell is a 58 y o  female  Patient presents today for persistent ear pain and headache  Was seen about 2 months ago for similar symptoms  Tried ear drops and flonase with some relief however has not resolved  Having pressure in her sinuses and worsening ear pain that she describes as being clogged   Denies fever or drainage       The following portions of the patient's history were reviewed and updated as appropriate: allergies, current medications, past family history, past medical history, past surgical history and problem list     Review of Systems   Constitutional: Negative for chills, fatigue and fever  HENT: Positive for ear pain  Negative for congestion, sinus pain, sore throat and trouble swallowing  Eyes: Negative for pain, discharge and redness  Respiratory: Negative for cough, chest tightness, shortness of breath and wheezing  Cardiovascular: Negative for chest pain, palpitations and leg swelling  Gastrointestinal: Negative for abdominal pain, diarrhea, nausea and vomiting  Musculoskeletal: Negative for arthralgias, joint swelling and myalgias  Skin: Negative for rash  Neurological: Positive for headaches  Negative for dizziness, weakness, light-headedness and numbness           Objective:      /90 (BP Location: Left arm, Patient Position: Sitting, Cuff Size: Large)   Pulse 69   Temp 98 1 °F (36 7 °C)   Ht 5' 3" (1 6 m)   Wt 82 6 kg (182 lb)   SpO2 99%   BMI 32 24 kg/m²          Physical Exam  Vitals and nursing note reviewed  Constitutional:       Appearance: She is well-developed  HENT:      Head: Normocephalic  Right Ear: Hearing normal  A middle ear effusion is present  Tympanic membrane is erythematous  Left Ear: Hearing normal   No middle ear effusion  Nose: No mucosal edema  Mouth/Throat:      Pharynx: Uvula midline  No posterior oropharyngeal erythema  Cardiovascular:      Rate and Rhythm: Normal rate and regular rhythm  Pulmonary:      Effort: Pulmonary effort is normal       Breath sounds: Normal breath sounds

## 2022-08-01 ENCOUNTER — TELEPHONE (OUTPATIENT)
Dept: FAMILY MEDICINE CLINIC | Facility: CLINIC | Age: 62
End: 2022-08-01

## 2022-08-01 NOTE — TELEPHONE ENCOUNTER
Pt finished antibiotic and is still experiencing pain in the right ear  She states it has improved, but still having mild pain  Pt would like to know if she needs another course of antibiotics? Please advise, thanks!

## 2022-08-01 NOTE — TELEPHONE ENCOUNTER
Would recommend short course then of oral steroid   I would not recommend more antibiotics at this point   If not responsive to steroid should see ENT

## 2022-08-02 DIAGNOSIS — H93.8X2 EAR CONGESTION, LEFT: Primary | ICD-10-CM

## 2022-08-02 RX ORDER — METHYLPREDNISOLONE 4 MG/1
TABLET ORAL
Qty: 1 EACH | Refills: 0 | Status: SHIPPED | OUTPATIENT
Start: 2022-08-02 | End: 2022-08-19 | Stop reason: ALTCHOICE

## 2022-08-16 ENCOUNTER — APPOINTMENT (EMERGENCY)
Dept: RADIOLOGY | Facility: HOSPITAL | Age: 62
End: 2022-08-16

## 2022-08-16 ENCOUNTER — HOSPITAL ENCOUNTER (EMERGENCY)
Facility: HOSPITAL | Age: 62
Discharge: HOME | End: 2022-08-16
Attending: EMERGENCY MEDICINE

## 2022-08-16 ENCOUNTER — APPOINTMENT (EMERGENCY)
Dept: ULTRASOUND IMAGING | Facility: HOSPITAL | Age: 62
End: 2022-08-16

## 2022-08-16 VITALS
DIASTOLIC BLOOD PRESSURE: 68 MMHG | HEIGHT: 63 IN | BODY MASS INDEX: 32.25 KG/M2 | SYSTOLIC BLOOD PRESSURE: 131 MMHG | WEIGHT: 182 LBS | HEART RATE: 73 BPM | TEMPERATURE: 97.9 F | OXYGEN SATURATION: 98 % | RESPIRATION RATE: 18 BRPM

## 2022-08-16 DIAGNOSIS — R10.84 GENERALIZED ABDOMINAL PAIN: ICD-10-CM

## 2022-08-16 DIAGNOSIS — R07.9 CHEST PAIN: Primary | ICD-10-CM

## 2022-08-16 LAB
ALBUMIN SERPL BCP-MCNC: 3.8 G/DL (ref 3.5–5)
ALP SERPL-CCNC: 93 U/L (ref 46–116)
ALT SERPL W P-5'-P-CCNC: 31 U/L (ref 12–78)
ANION GAP SERPL CALCULATED.3IONS-SCNC: 11 MMOL/L (ref 4–13)
AST SERPL W P-5'-P-CCNC: 32 U/L (ref 5–45)
BASOPHILS # BLD AUTO: 0.03 THOUSANDS/ΜL (ref 0–0.1)
BASOPHILS NFR BLD AUTO: 0 % (ref 0–1)
BILIRUB DIRECT SERPL-MCNC: 0.07 MG/DL (ref 0–0.2)
BILIRUB SERPL-MCNC: 0.54 MG/DL (ref 0.2–1)
BUN SERPL-MCNC: 12 MG/DL (ref 5–25)
CALCIUM SERPL-MCNC: 9.8 MG/DL (ref 8.3–10.1)
CARDIAC TROPONIN I PNL SERPL HS: <2 NG/L
CARDIAC TROPONIN I PNL SERPL HS: <2 NG/L
CHLORIDE SERPL-SCNC: 103 MMOL/L (ref 96–108)
CO2 SERPL-SCNC: 26 MMOL/L (ref 21–32)
CREAT SERPL-MCNC: 1.08 MG/DL (ref 0.6–1.3)
D DIMER PPP FEU-MCNC: 0.42 UG/ML FEU
EOSINOPHIL # BLD AUTO: 0.03 THOUSAND/ΜL (ref 0–0.61)
EOSINOPHIL NFR BLD AUTO: 0 % (ref 0–6)
ERYTHROCYTE [DISTWIDTH] IN BLOOD BY AUTOMATED COUNT: 14.7 % (ref 11.6–15.1)
GFR SERPL CREATININE-BSD FRML MDRD: 55 ML/MIN/1.73SQ M
GLUCOSE SERPL-MCNC: 118 MG/DL (ref 65–140)
HCT VFR BLD AUTO: 46.2 % (ref 34.8–46.1)
HGB BLD-MCNC: 15.4 G/DL (ref 11.5–15.4)
IMM GRANULOCYTES # BLD AUTO: 0.03 THOUSAND/UL (ref 0–0.2)
IMM GRANULOCYTES NFR BLD AUTO: 0 % (ref 0–2)
LIPASE SERPL-CCNC: 74 U/L (ref 73–393)
LYMPHOCYTES # BLD AUTO: 1.93 THOUSANDS/ΜL (ref 0.6–4.47)
LYMPHOCYTES NFR BLD AUTO: 24 % (ref 14–44)
MAGNESIUM SERPL-MCNC: 2.2 MG/DL (ref 1.6–2.6)
MCH RBC QN AUTO: 29.7 PG (ref 26.8–34.3)
MCHC RBC AUTO-ENTMCNC: 33.3 G/DL (ref 31.4–37.4)
MCV RBC AUTO: 89 FL (ref 82–98)
MONOCYTES # BLD AUTO: 0.53 THOUSAND/ΜL (ref 0.17–1.22)
MONOCYTES NFR BLD AUTO: 7 % (ref 4–12)
NEUTROPHILS # BLD AUTO: 5.41 THOUSANDS/ΜL (ref 1.85–7.62)
NEUTS SEG NFR BLD AUTO: 69 % (ref 43–75)
NRBC BLD AUTO-RTO: 0 /100 WBCS
PLATELET # BLD AUTO: 315 THOUSANDS/UL (ref 149–390)
PMV BLD AUTO: 10.7 FL (ref 8.9–12.7)
POTASSIUM SERPL-SCNC: 4 MMOL/L (ref 3.5–5.3)
PROT SERPL-MCNC: 8.1 G/DL (ref 6.4–8.4)
RBC # BLD AUTO: 5.18 MILLION/UL (ref 3.81–5.12)
SODIUM SERPL-SCNC: 140 MMOL/L (ref 135–147)
WBC # BLD AUTO: 7.96 THOUSAND/UL (ref 4.31–10.16)

## 2022-08-16 PROCEDURE — 85379 FIBRIN DEGRADATION QUANT: CPT | Performed by: EMERGENCY MEDICINE

## 2022-08-16 PROCEDURE — 76705 ECHO EXAM OF ABDOMEN: CPT

## 2022-08-16 PROCEDURE — C9113 INJ PANTOPRAZOLE SODIUM, VIA: HCPCS | Performed by: EMERGENCY MEDICINE

## 2022-08-16 PROCEDURE — 36415 COLL VENOUS BLD VENIPUNCTURE: CPT | Performed by: EMERGENCY MEDICINE

## 2022-08-16 PROCEDURE — 99285 EMERGENCY DEPT VISIT HI MDM: CPT | Performed by: EMERGENCY MEDICINE

## 2022-08-16 PROCEDURE — 85025 COMPLETE CBC W/AUTO DIFF WBC: CPT | Performed by: EMERGENCY MEDICINE

## 2022-08-16 PROCEDURE — 71046 X-RAY EXAM CHEST 2 VIEWS: CPT

## 2022-08-16 PROCEDURE — 83690 ASSAY OF LIPASE: CPT | Performed by: EMERGENCY MEDICINE

## 2022-08-16 PROCEDURE — 99285 EMERGENCY DEPT VISIT HI MDM: CPT

## 2022-08-16 PROCEDURE — 93005 ELECTROCARDIOGRAM TRACING: CPT

## 2022-08-16 PROCEDURE — 80076 HEPATIC FUNCTION PANEL: CPT | Performed by: EMERGENCY MEDICINE

## 2022-08-16 PROCEDURE — 96361 HYDRATE IV INFUSION ADD-ON: CPT

## 2022-08-16 PROCEDURE — 96374 THER/PROPH/DIAG INJ IV PUSH: CPT

## 2022-08-16 PROCEDURE — 80048 BASIC METABOLIC PNL TOTAL CA: CPT | Performed by: EMERGENCY MEDICINE

## 2022-08-16 PROCEDURE — 83735 ASSAY OF MAGNESIUM: CPT | Performed by: EMERGENCY MEDICINE

## 2022-08-16 PROCEDURE — 84484 ASSAY OF TROPONIN QUANT: CPT | Performed by: EMERGENCY MEDICINE

## 2022-08-16 RX ORDER — MECLIZINE HYDROCHLORIDE 25 MG/1
25 TABLET ORAL ONCE
Status: COMPLETED | OUTPATIENT
Start: 2022-08-16 | End: 2022-08-16

## 2022-08-16 RX ORDER — PANTOPRAZOLE SODIUM 40 MG/10ML
40 INJECTION, POWDER, LYOPHILIZED, FOR SOLUTION INTRAVENOUS ONCE
Status: COMPLETED | OUTPATIENT
Start: 2022-08-16 | End: 2022-08-16

## 2022-08-16 RX ORDER — MAGNESIUM HYDROXIDE/ALUMINUM HYDROXICE/SIMETHICONE 120; 1200; 1200 MG/30ML; MG/30ML; MG/30ML
30 SUSPENSION ORAL ONCE
Status: COMPLETED | OUTPATIENT
Start: 2022-08-16 | End: 2022-08-16

## 2022-08-16 RX ORDER — LIDOCAINE HYDROCHLORIDE 20 MG/ML
10 SOLUTION OROPHARYNGEAL ONCE
Status: COMPLETED | OUTPATIENT
Start: 2022-08-16 | End: 2022-08-16

## 2022-08-16 RX ADMIN — ALUMINA, MAGNESIA, AND SIMETHICONE ORAL SUSPENSION REGULAR STRENGTH 30 ML: 1200; 1200; 120 SUSPENSION ORAL at 11:21

## 2022-08-16 RX ADMIN — LIDOCAINE HYDROCHLORIDE 10 ML: 20 SOLUTION ORAL; TOPICAL at 11:21

## 2022-08-16 RX ADMIN — MECLIZINE HYDROCHLORIDE 25 MG: 25 TABLET ORAL at 11:23

## 2022-08-16 RX ADMIN — SODIUM CHLORIDE 1000 ML: 0.9 INJECTION, SOLUTION INTRAVENOUS at 11:23

## 2022-08-16 RX ADMIN — PANTOPRAZOLE SODIUM 40 MG: 40 INJECTION, POWDER, FOR SOLUTION INTRAVENOUS at 11:22

## 2022-08-16 NOTE — ED PROVIDER NOTES
History  Chief Complaint   Patient presents with    Chest Pain     Intermittent chest pain x 2 days; dizziness that shes being treated for an ear inf      Patient is a 26-year-old female with past medical history of GERD, primary 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, prediabetes, presents to the emergency department complaining of chest pain  Patient states that 2 weeks ago she was being treated for an ear infection and finished a course of Levaquin  She states that she return to her doctor because her symptoms did not resolve and then was placed on oral steroids as well as nasal steroid spray  She states her earache is overall getting better however since being on the steroid she has had a slight dizzy feeling that she states feels like I am on a boat " She denies any room spinning sensation or vertigo and she states she has had vertigo in the past and this does not feel similar  Denies any unsteady gait or ataxia  She states today was the 1st day that her dizziness seem to resolve  She does report that since yesterday she has been having pain that travels from her mid abdomen up into her central chest described as a tightness and squeezing pain that comes and goes  She also feels a gurgling sensation in this region as well as in her mid and upper back  She denies any associated diaphoresis with the chest discomfort  She denies any alleviating or aggravating factors such as exertion  Denies shortness of breath, palpitations, headache, syncope or near syncope, tinnitus or loss of hearing, cough, hemoptysis, URI symptoms, abdominal distension, nausea, vomiting, diarrhea, constipation, urinary symptoms, flank pain, skin rash or color change, leg pain or swelling, lateralizing extremity weakness or paresthesia or other focal neurologic deficits  Denies any personal history of cardiac disease or venous thromboembolism        History provided by:  Patient   used: No    Chest Pain  Associated symptoms: abdominal pain and dizziness    Associated symptoms: no back pain, no cough, no diaphoresis, no fever, no headache, no nausea, no numbness, no palpitations, no shortness of breath, not vomiting and no weakness        Prior to Admission Medications   Prescriptions Last Dose Informant Patient Reported? Taking?    Aloe Vera 25 MG CAPS  Self Yes No   Sig: Take 50 mg by mouth   Patient not taking: Reported on 2021   B COMPLEX-BIOTIN-FA ER PO  Self Yes No   Sig: Take by mouth   Patient not taking: Reported on 2021   Calcium Citrate 200 MG TABS  Self Yes No   Sig: Take 400 mg by mouth   Patient not taking: Reported on 2021   Chromium Picolinate 200 MCG CAPS  Self Yes No   Sig: Take by mouth   Patient not taking: Reported on 2021   Cinnamon Oil OIL  Self Yes No   Sig: by Does not apply route   Patient not taking: Reported on 2021   Ginkgo Biloba 40 MG TABS  Self Yes No   Sig: Take by mouth   Patient not taking: Reported on 2021   NON FORMULARY  Self Yes No   Patient not taking: Reported on 2021   Riboflavin (B2 PO)  Self Yes No   Sig: Take by mouth   Patient not taking: Reported on 2021   TURMERIC PO  Self Yes No   Sig: Take by mouth   Patient not taking: Reported on 2021   Vitamin D, Cholecalciferol, 400 units TABS  Self Yes No   Sig: Take by mouth   Zinc Sulfate (ZINC 15 PO)   Yes No   Sig: Take by mouth   ascorbic acid (VITAMIN C) 250 mg tablet   Yes No   Sig: Take 250 mg by mouth daily   aspirin (ECOTRIN LOW STRENGTH) 81 mg EC tablet  Self Yes No   Sig: Take 81 mg by mouth daily   Patient not taking: Reported on 2022   cyanocobalamin (VITAMIN B-12) 1,000 mcg tablet  Self Yes No   Sig: Take 1,000 mcg by mouth   Patient not taking: Reported on 2021   fluticasone (FLONASE) 50 mcg/act nasal spray   No No   Si spray into each nostril daily   glucosamine-chondroitin 500-400 MG tablet  Self Yes No   Sig: Take 1 tablet by mouth 3 (three) times a day   Patient not taking: Reported on 9/23/2021   methocarbamol (ROBAXIN) 500 mg tablet  Self No No   Sig: Take 1 tablet (500 mg total) by mouth 4 (four) times a day   Patient not taking: Reported on 9/23/2021   methylPREDNISolone 4 MG tablet therapy pack   No No   Sig: Use as directed on package   neomycin-polymyxin-hydrocortisone (CORTISPORIN) otic solution   No No   Sig: Administer 4 drops to the right ear every 6 (six) hours   Patient not taking: Reported on 7/25/2022   nystatin (MYCOSTATIN) 500,000 units/5 mL suspension   No No   Sig: Apply 5 mL (500,000 Units total) to the mouth or throat 4 (four) times a day   Patient not taking: Reported on 9/23/2021   patient supplied medication  Self Yes No   Patient not taking: Reported on 9/23/2021   patient supplied medication  Self Yes No   Patient not taking: Reported on 9/23/2021   patient supplied medication  Self Yes No   Patient not taking: Reported on 9/23/2021      Facility-Administered Medications: None       Past Medical History:   Diagnosis Date    Abnormal Pap smear of cervix     [facility] 12/2018    oral    Hearing loss     down 50% in right ear    Kidney stone     6 or 7 yrs ago    Lumbar herniated disc     Migraine     Neck pain     Prediabetes     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[facility] [address] ) 3/8/2019    Varicella     Wears glasses        Past Surgical History:   Procedure Laterality Date    HYSTERECTOMY  2007    OOPHORECTOMY Bilateral 2007    VT EXCIS TONGUE LESN N/A 1/9/2019    Procedure: Partial glossectomy with FROZEN SECTION;  Surgeon: Gogo Diggs DO;  Location: [facility];  Service: ENT    VT EXCIS TONGUE LESN Right 3/8/2019    Procedure: LATERAL TONGUE SURGICAL SITE RE-EXCISION WITH FROZEN SECTION ;  Surgeon: Gogo Diggs DO;  Location: [facility];  Service: ENT    VT REMOVAL NODES, NECK,CERV MOD RAD Bilateral 3/8/2019    Procedure: SUPRAOMOHYOID NECK DISSECTION;  Surgeon:  Gogo Diggs DO; Location: [facility];  Service: ENT    TONSILLECTOMY      TUBAL LIGATION      WISDOM TOOTH EXTRACTION         Family History   Problem Relation Age of Onset    Hypertension Mother     Diabetes Father     Hypertension Father     No Known Problems Daughter     No Known Problems Maternal Grandmother     No Known Problems Paternal Grandmother     No Known Problems Paternal Aunt     No Known Problems Paternal Aunt     Breast cancer Neg Hx     Endometrial cancer Neg Hx     Ovarian cancer Neg Hx      I have reviewed and agree with the history as documented  E-Cigarette/Vaping    E-Cigarette Use Never User      E-Cigarette/Vaping Substances    Nicotine No     THC No     CBD No     Flavoring No     Other No     Unknown No      Social History     Tobacco Use    Smoking status: Never Smoker    Smokeless tobacco: Never Used   Vaping Use    Vaping Use: Never used   Substance Use Topics    Alcohol use: No    Drug use: No       Review of Systems   Constitutional: Negative for chills, diaphoresis and fever  HENT: Positive for ear pain  Negative for congestion, ear discharge, hearing loss, rhinorrhea, sore throat and tinnitus  Eyes: Negative for photophobia, pain and visual disturbance  Respiratory: Negative for cough, chest tightness, shortness of breath and wheezing  Cardiovascular: Positive for chest pain  Negative for palpitations and leg swelling  Gastrointestinal: Positive for abdominal pain  Negative for abdominal distention, blood in stool, constipation, diarrhea, nausea and vomiting  Genitourinary: Negative for dysuria, flank pain, frequency and hematuria  Musculoskeletal: Negative for back pain, neck pain and neck stiffness  Skin: Negative for color change, pallor, rash and wound  Allergic/Immunologic: Negative for immunocompromised state  Neurological: Positive for dizziness   Negative for syncope, facial asymmetry, speech difficulty, weakness, light-headedness, numbness and headaches  Hematological: Negative for adenopathy  Does not bruise/bleed easily  Psychiatric/Behavioral: Negative for confusion and decreased concentration  All other systems reviewed and are negative  Physical Exam  Physical Exam  Vitals and nursing note reviewed  Constitutional:       General: She is not in acute distress  Appearance: Normal appearance  She is well-developed  She is not ill-appearing, toxic-appearing or diaphoretic  HENT:      Head: Normocephalic and atraumatic  Right Ear: Tympanic membrane, ear canal and external ear normal       Left Ear: Tympanic membrane, ear canal and external ear normal       Mouth/Throat:      Mouth: Mucous membranes are moist       Pharynx: Oropharynx is clear  No oropharyngeal exudate  Eyes:      Extraocular Movements: Extraocular movements intact  Conjunctiva/sclera: Conjunctivae normal       Pupils: Pupils are equal, round, and reactive to light  Neck:      Vascular: No JVD  Cardiovascular:      Rate and Rhythm: Regular rhythm  Tachycardia present  Pulses: Normal pulses  Heart sounds: Normal heart sounds  No murmur heard  No friction rub  No gallop  Pulmonary:      Effort: Pulmonary effort is normal  No respiratory distress  Breath sounds: Normal breath sounds  No wheezing, rhonchi or rales  Abdominal:      General: There is no distension  Palpations: Abdomen is soft  Tenderness: There is abdominal tenderness  There is no guarding or rebound  Comments: Mild tenderness in the right upper quadrant  Negative Mendoza sign  Musculoskeletal:         General: No swelling or tenderness  Normal range of motion  Cervical back: Normal range of motion and neck supple  No rigidity  Skin:     General: Skin is warm and dry  Coloration: Skin is not pale  Findings: No erythema or rash  Neurological:      General: No focal deficit present        Mental Status: She is alert and oriented to person, place, and time  Cranial Nerves: No cranial nerve deficit  Sensory: No sensory deficit  Motor: No weakness  Psychiatric:         Mood and Affect: Mood normal          Behavior: Behavior normal          Vital Signs  ED Triage Vitals   Temperature Pulse Respirations Blood Pressure SpO2   08/16/22 1034 08/16/22 1034 08/16/22 1034 08/16/22 1034 08/16/22 1034   97 9 °F (36 6 °C) (!) 108 18 132/85 97 %      Temp Source Heart Rate Source Patient Position - Orthostatic VS BP Location FiO2 (%)   08/16/22 1034 08/16/22 1034 08/16/22 1034 08/16/22 1034 --   Oral Monitor Lying Right arm       Pain Score       08/16/22 1132       No Pain         Vitals:    08/16/22 1034 08/16/22 1132 08/16/22 1356   BP: 132/85 132/85 131/68   BP Location: Right arm Left arm Right arm   Pulse: (!) 108 86 73   Resp: 18 18 18   Temp: 97 9 °F (36 6 °C)     TempSrc: Oral     SpO2: 97% 99% 98%   Weight: 82 6 kg (182 lb)     Height: 5' 3" (1 6 m)         Visual Acuity      ED Medications  Medications   sodium chloride 0 9 % bolus 1,000 mL (0 mL Intravenous Stopped 8/16/22 1223)   Lidocaine Viscous HCl (XYLOCAINE) 2 % mucosal solution 10 mL (10 mL Swish & Swallow Given 8/16/22 1121)   aluminum-magnesium hydroxide-simethicone (MYLANTA) oral suspension 30 mL (30 mL Oral Given 8/16/22 1121)   meclizine (ANTIVERT) tablet 25 mg (25 mg Oral Given 8/16/22 1123)   pantoprazole (PROTONIX) injection 40 mg (40 mg Intravenous Given 8/16/22 1122)       Diagnostic Studies  Results Reviewed     Procedure Component Value Units Date/Time    HS Troponin I 2hr [[id number]] Collected: 08/16/22 1345    Lab Status: Final result Specimen: Blood from Arm, Left Updated: 08/16/22 1431     hs TnI 2hr <2 ng/L      Delta 2hr hsTnI --    D-dimer, quantitative [[id number]]  (Normal) Collected: 08/16/22 1128    Lab Status: Final result Specimen: Blood from Arm, Left Updated: 08/16/22 1232     D-Dimer, Quant 0 42 ug/ml FEU     Narrative:       In the evaluation for possible pulmonary embolism, in the appropriate (Well's Score of 4 or less) patient, the age adjusted d-dimer cutoff for this patient can be calculated as:    Age x 0 01 (in ug/mL) for Age-adjusted D-dimer exclusion threshold for a patient over 50 years      Basic metabolic panel [[id number]] Collected: 08/16/22 1128    Lab Status: Final result Specimen: Blood from Arm, Left Updated: 08/16/22 1212     Sodium 140 mmol/L      Potassium 4 0 mmol/L      Chloride 103 mmol/L      CO2 26 mmol/L      ANION GAP 11 mmol/L      BUN 12 mg/dL      Creatinine 1 08 mg/dL      Glucose 118 mg/dL      Calcium 9 8 mg/dL      eGFR 55 ml/min/1 73sq m     Narrative:      [place] guidelines for Chronic Kidney Disease (CKD):     Stage 1 with normal or high GFR (GFR > 90 mL/min/1 73 square meters)    Stage 2 Mild CKD (GFR = 60-89 mL/min/1 73 square meters)    Stage 3A Moderate CKD (GFR = 45-59 mL/min/1 73 square meters)    Stage 3B Moderate CKD (GFR = 30-44 mL/min/1 73 square meters)    Stage 4 Severe CKD (GFR = 15-29 mL/min/1 73 square meters)    Stage 5 End Stage CKD (GFR <15 mL/min/1 73 square meters)  Note: GFR calculation is accurate only with a steady state creatinine    Hepatic function panel [[id number]]  (Normal) Collected: 08/16/22 1128    Lab Status: Final result Specimen: Blood from Arm, Left Updated: 08/16/22 1212     Total Bilirubin 0 54 mg/dL      Bilirubin, Direct 0 07 mg/dL      Alkaline Phosphatase 93 U/L      AST 32 U/L      ALT 31 U/L      Total Protein 8 1 g/dL      Albumin 3 8 g/dL     Lipase [[id number]]  (Normal) Collected: 08/16/22 1128    Lab Status: Final result Specimen: Blood from Arm, Left Updated: 08/16/22 1212     Lipase 74 u/L     Magnesium [[id number]]  (Normal) Collected: 08/16/22 1128    Lab Status: Final result Specimen: Blood from Arm, Left Updated: 08/16/22 1212     Magnesium 2 2 mg/dL     HS Troponin 0hr (reflex protocol) [[id number]]  (Normal) Collected: 08/16/22 1128 Lab Status: Final result Specimen: Blood from Arm, Left Updated: 08/16/22 1201     hs TnI 0hr <2 ng/L     CBC and differential [[id number]]  (Abnormal) Collected: 08/16/22 1128    Lab Status: Final result Specimen: Blood from Arm, Left Updated: 08/16/22 1137     WBC 7 96 Thousand/uL      RBC 5 18 Million/uL      Hemoglobin 15 4 g/dL      Hematocrit 46 2 %      MCV 89 fL      MCH 29 7 pg      MCHC 33 3 g/dL      RDW 14 7 %      MPV 10 7 fL      Platelets 072 Thousands/uL      nRBC 0 /100 WBCs      Neutrophils Relative 69 %      Immat GRANS % 0 %      Lymphocytes Relative 24 %      Monocytes Relative 7 %      Eosinophils Relative 0 %      Basophils Relative 0 %      Neutrophils Absolute 5 41 Thousands/µL      Immature Grans Absolute 0 03 Thousand/uL      Lymphocytes Absolute 1 93 Thousands/µL      Monocytes Absolute 0 53 Thousand/µL      Eosinophils Absolute 0 03 Thousand/µL      Basophils Absolute 0 03 Thousands/µL                  XR chest 2 views   ED Interpretation by Ava Hope DO (08/16 8861)   No acute abnormality in the chest       US right upper quadrant   Final Result by Israel Bustamante MD (08/16 1225)   Minimal gallbladder sludge seen without any sonographic signs of acute cholecystitis      No biliary dilation seen   Hepatic steatosis      Workstation performed: [id number]                    Procedures  ECG 12 Lead Documentation Only    Date/Time: 8/16/2022 12:05 PM  Performed by: Ava Hope DO  Authorized by: Ava Hope DO     ECG reviewed by me, the ED Provider: yes    Patient location:  ED  Previous ECG:     Previous ECG:  Unavailable  Rate:     ECG rate:  94    ECG rate assessment: normal    Rhythm:     Rhythm: sinus rhythm      Rhythm comment:  With marked sinus arrhythmia  Ectopy:     Ectopy: none    QRS:     QRS axis:  Normal    QRS intervals:  Normal  Conduction:     Conduction: normal    ST segments:     ST segments:  Non-specific  T waves:     T waves: normal      ECG 12 Lead Documentation Only    Date/Time: 8/16/2022 3:00 PM  Performed by: Ale Garcia DO  Authorized by: Ale Garcia DO     ECG reviewed by me, the ED Provider: yes    Patient location:  ED  Previous ECG:     Previous ECG:  Compared to current    Comparison ECG info:  Nonspecific ST abnormality no longer present  Interpretation:     Interpretation: normal    Rate:     ECG rate:  77    ECG rate assessment: normal    Rhythm:     Rhythm: sinus rhythm    Ectopy:     Ectopy: none    QRS:     QRS axis:  Normal    QRS intervals:  Normal  Conduction:     Conduction: normal    ST segments:     ST segments:  Normal  T waves:     T waves: normal    Comments:      Low-voltage QRS complex  ED Course  ED Course as of 08/16/22 1503   Tue Aug 16, 2022   1108 Feels like she has been on a boat ever since being treated for ear infection and has been on steroids, initially treated 2 weeks ago with Levaquin then switch to prednisone and nasal steroid  Does not feel spinning sensation or unsteady on her gait  Since yesterday has had abdominal pain radiating into her chest and mid upper back, described as a squeezing tightness and a "gurgling" feeling   1209 hs TnI 0hr: <2   1248 D-Dimer, Quant: 0 42   1248 hs TnI 0hr: <2   1329 Updated patient about normal workup thus far  She is feeling better after GI cocktail  Will await repeat troponin and EKG  Patient has appointment with her PCP on Friday  [id number] Updated patient about repeat EKG and troponin being normal   Patient has an appointment with her PCP this Friday and advised her to keep this appointment and discuss her current symptoms  Advised to return to the ED immediately if symptoms worsen  SBIRT 22yo+    Flowsheet Row Most Recent Value   SBIRT (23 yo +)    In order to provide better care to our patients, we are screening all of our patients for alcohol and drug use   Would it be okay to ask you these screening questions? Yes Filed at: 08/16/2022 1240   Initial Alcohol Screen: US AUDIT-C     1  How often do you have a drink containing alcohol? 0 Filed at: 08/16/2022 1240   2  How many drinks containing alcohol do you have on a typical day you are drinking? 0 Filed at: 08/16/2022 1240   3b  FEMALE Any Age, or MALE 65+: How often do you have 4 or more drinks on one occassion? 0 Filed at: 08/16/2022 1240   Audit-C Score 0 Filed at: 08/16/2022 1240   MICHAEL: How many times in the past year have you    Used an illegal drug or used a prescription medication for non-medical reasons? Never Filed at: 08/16/2022 1240                    MDM  Number of Diagnoses or Management Options  Diagnosis management comments: 80-year-old female presents to the emergency department complaining of 1-2 days of intermittent chest pain originating in her upper abdomen radiating into her chest and into her upper back  Patient also reports a sensation of slight dizziness over the past week however today it is improved  She denies a spinning sensation or any associated neurologic deficits in overall very low suspicion for stroke  This is most likely related to her recent ear infection and possibly being on steroids  In regards to the chest discomfort this could be secondary to indigestion, GERD, hiatal hernia, esophagitis, biliary colic or cholecystitis, ACS or acute PE but less likely  Very low clinical suspicion for aortic dissection  Will workup with abdominal and cardiac labs, EKG, chest x-ray, right upper quadrant ultrasound and D-dimer  Will treat with GI cocktail and reassess         Amount and/or Complexity of Data Reviewed  Clinical lab tests: ordered and reviewed  Tests in the radiology section of CPT®: ordered and reviewed  Tests in the medicine section of CPT®: reviewed and ordered  Independent visualization of images, tracings, or specimens: yes        Disposition  Final diagnoses:   Chest pain   Generalized abdominal pain     Time reflects when diagnosis was documented in both MDM as applicable and the Disposition within this note     Time User Action Codes Description Comment    8/16/2022  3:00 PM Kiran Furnish Add [R07 9] Chest pain     8/16/2022  3:01 PM Kiran Furnish Add [R10 84] Generalized abdominal pain       ED Disposition     ED Disposition   Discharge    Condition   Stable    Date/Time   Tue Aug 16, 2022  3:00 PM    Comment   Shavonne Lang discharge to home/self care  Follow-up Information     Follow up With Specialties Details Why Contact Info Additional Information    Petra Elizabeth, [address], Nurse Practitioner Go on 8/19/2022  [address]   [address]  [address] Emergency Department Emergency Medicine Go to  If symptoms worsen [address] [facility] [id number] [facility] Emergency Department, [address] [place], [place], [address]          Patient's Medications   Discharge Prescriptions    No medications on file       No discharge procedures on file      PDMP Review     None          ED Provider  Electronically Signed by           Terence Christianson DO  08/16/22 4620

## 2022-08-19 ENCOUNTER — OFFICE VISIT (OUTPATIENT)
Dept: FAMILY MEDICINE CLINIC | Facility: CLINIC | Age: 62
End: 2022-08-19

## 2022-08-19 VITALS
OXYGEN SATURATION: 98 % | SYSTOLIC BLOOD PRESSURE: 118 MMHG | WEIGHT: 176 LBS | BODY MASS INDEX: 31.18 KG/M2 | DIASTOLIC BLOOD PRESSURE: 74 MMHG | TEMPERATURE: 97.8 F | HEART RATE: 86 BPM | HEIGHT: 63 IN

## 2022-08-19 DIAGNOSIS — R51.9 FREQUENT HEADACHES: ICD-10-CM

## 2022-08-19 DIAGNOSIS — K22.2 ESOPHAGEAL STRICTURE: Primary | ICD-10-CM

## 2022-08-19 DIAGNOSIS — R42 DIZZINESS: ICD-10-CM

## 2022-08-19 DIAGNOSIS — K21.00 GASTROESOPHAGEAL REFLUX DISEASE WITH ESOPHAGITIS, UNSPECIFIED WHETHER HEMORRHAGE: ICD-10-CM

## 2022-08-19 DIAGNOSIS — R09.81 NASAL SINUS CONGESTION: ICD-10-CM

## 2022-08-19 DIAGNOSIS — K21.9 GASTROESOPHAGEAL REFLUX DISEASE WITHOUT ESOPHAGITIS: ICD-10-CM

## 2022-08-19 PROBLEM — Z01.419 ENCNTR FOR GYN EXAM (GENERAL) (ROUTINE) W/O ABN FINDINGS: Status: RESOLVED | Noted: 2019-10-24 | Resolved: 2022-08-19

## 2022-08-19 PROCEDURE — 99214 OFFICE O/P EST MOD 30 MIN: CPT | Performed by: NURSE PRACTITIONER

## 2022-08-19 RX ORDER — PANTOPRAZOLE SODIUM 40 MG/1
40 TABLET, DELAYED RELEASE ORAL
Qty: 30 TABLET | Refills: 1 | Status: SHIPPED | OUTPATIENT
Start: 2022-08-19 | End: 2022-10-13

## 2022-08-19 RX ORDER — PREDNISONE 10 MG/1
TABLET ORAL
Qty: 18 TABLET | Refills: 0 | Status: SHIPPED | OUTPATIENT
Start: 2022-08-19 | End: 2022-08-28

## 2022-08-19 RX ORDER — MECLIZINE HYDROCHLORIDE 25 MG/1
25 TABLET ORAL EVERY 8 HOURS PRN
Qty: 45 TABLET | Refills: 0 | Status: SHIPPED | OUTPATIENT
Start: 2022-08-19 | End: 2022-09-03

## 2022-08-19 NOTE — PROGRESS NOTES
Assessment/Plan:           Problem List Items Addressed This Visit        Digestive    Esophageal stricture - Primary    Relevant Medications    pantoprazole (PROTONIX) 40 mg tablet    Other Relevant Orders    Ambulatory Referral to Gastroenterology    Gastroesophageal reflux disease without esophagitis    Relevant Medications    pantoprazole (PROTONIX) 40 mg tablet    Gastroesophageal reflux disease with esophagitis    Relevant Medications    pantoprazole (PROTONIX) 40 mg tablet    Other Relevant Orders    Ambulatory Referral to Gastroenterology       Respiratory    Nasal sinus congestion    Relevant Medications    predniSONE 10 mg tablet       Other    Frequent headaches    Relevant Medications    predniSONE 10 mg tablet    Other Relevant Orders    VAS carotid complete study    MRI brain wo contrast    Dizziness    Relevant Medications    meclizine (ANTIVERT) 25 mg tablet    Other Relevant Orders    Ambulatory Referral to Physical Therapy            Subjective:      Patient ID: Mecca Araiza is a 58 y o  female  Patient here for follow up from her recent Emergency visit and reports that she went to the hospital Tuesday morning 3 days ago and had gone in with complaints of midsternum chest pain and had eval for MI and was ruled out and not cardiac  Patient symptoms were felt to be related to her esophagus  And was given Mylanta with lidocaine and helped her symptoms  Patient has not scheduled with GI yet for follow up  Patient is not on anything currently for her esophagus at this point  Patient also reports that she drinking about 90 ounces of water a day and was felt in the ED that she was dehydrated  Patient is having issues with feeling dryness in her mouth not currently having dryness in her eyes     Patient main reason she is here is that she is having problems in the ear since April and saw other providers int he office and had been having problems with her ears and was treated with ear drops in her left ear and Flonase and did not go away and then started hurting with pain and saw Bre Garcia and was treated with antibiotic and was still hurting and then had steroids  Patient when she has an ear problems since Apirl 2022 she is having episodes of feeling like she is having a "wave" of dizziness  Patient took meclizine and was helpful for the dizziness and also having headaches with this as well and travels in the front of her head and feeling pressure in her sinuses and no other sinus symptoms  Patient has not seen ENT in a while  The following portions of the patient's history were reviewed and updated as appropriate:   She  has a past medical history of Abnormal Pap smear of cervix, Cancer ([facility] [address] ) (12/2018), Hearing loss, Kidney stone, Lumbar herniated disc, Migraine, Neck pain, Prediabetes, 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[facility] ) (3/8/2019), Varicella, and Wears glasses  She   Patient Active Problem List    Diagnosis Date Noted    Gastroesophageal reflux disease with esophagitis 08/19/2022    Nasal sinus congestion 08/19/2022    Frequent headaches 08/19/2022    Dizziness 08/19/2022    Urinary frequency 12/31/2020    Gastroesophageal reflux disease without esophagitis 10/03/2019    Plantar fasciitis 10/03/2019    Hyperlipidemia 10/03/2019    Normal gynecologic examination 10/03/2019    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HCC) 03/08/2019    Migraine with aura and without status migrainosus, not intractable 02/07/2019    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 12/27/2018    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    Bilateral hearing loss 11/15/2018    Chronic fatigue 11/15/2018    Arthralgia 11/15/2018    Esophageal stricture 11/15/2018    Dry eyes 11/15/2018    Mouth dryness 11/15/2018    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    Calculus of right kidney 10/15/2015     She  has a past surgical history that includes Tonsillectomy;  Tubal ligation; [place] tooth extraction; pr excis tongue lesn (N/A, 1/9/2019); pr removal nodes, neck,cerv mod rad (Bilateral, 3/8/2019); pr excis tongue lesn (Right, 3/8/2019); Hysterectomy (2007); and Oophorectomy (Bilateral, 2007)  Her family history includes Diabetes in her father; Hypertension in her father and mother; No Known Problems in her daughter, maternal grandmother, paternal aunt, paternal aunt, and paternal grandmother  She  reports that she has never smoked  She has never used smokeless tobacco  She reports that she does not drink alcohol and does not use drugs  Current Outpatient Medications   Medication Sig Dispense Refill    fluticasone (FLONASE) 50 mcg/act nasal spray 1 spray into each nostril daily 16 mL 1    meclizine (ANTIVERT) 25 mg tablet Take 1 tablet (25 mg total) by mouth every 8 (eight) hours as needed for dizziness for up to 15 days 45 tablet 0    pantoprazole (PROTONIX) 40 mg tablet Take 1 tablet (40 mg total) by mouth daily before breakfast 30 tablet 1    predniSONE 10 mg tablet Take 1 tablet (10 mg total) by mouth 3 (three) times a day for 3 days, THEN 1 tablet (10 mg total) 2 (two) times a day for 3 days, THEN 1 tablet (10 mg total) daily for 3 days  18 tablet 0    ascorbic acid (VITAMIN C) 250 mg tablet Take 250 mg by mouth daily      Vitamin D, Cholecalciferol, 400 units TABS Take by mouth      Zinc Sulfate (ZINC 15 PO) Take by mouth       No current facility-administered medications for this visit  She is allergic to albuterol, bee venom, and penicillins       Review of Systems   Constitutional: Negative for activity change, appetite change, chills, diaphoresis, fatigue, fever and unexpected weight change  HENT: Negative for congestion, dental problem, drooling, ear discharge, ear pain, facial swelling, hearing loss, mouth sores, nosebleeds, postnasal drip, sinus pressure, sinus pain, sneezing, sore throat, tinnitus, trouble swallowing and voice change           Sinus pressure    Eyes: Negative for pain, discharge, redness, itching and visual disturbance  Respiratory: Negative for apnea, cough, chest tightness and shortness of breath  Cardiovascular: Negative for chest pain, palpitations and leg swelling  Gastrointestinal: Negative for abdominal distention, abdominal pain, anal bleeding, blood in stool, constipation, diarrhea, nausea, rectal pain and vomiting  Endocrine: Negative  Genitourinary: Negative  Musculoskeletal: Negative for arthralgias  Skin: Negative  Allergic/Immunologic: Negative  Neurological: Positive for dizziness and headaches  Negative for light-headedness  Hematological: Negative  Psychiatric/Behavioral: Negative for behavioral problems and dysphoric mood  Objective:      /74 (BP Location: Left arm, Patient Position: Sitting, Cuff Size: Large)   Pulse 86   Temp 97 8 °F (36 6 °C)   Ht 5' 3" (1 6 m)   Wt 79 8 kg (176 lb)   SpO2 98%   BMI 31 18 kg/m²          Physical Exam  Vitals and nursing note reviewed  Constitutional:       General: She is not in acute distress  Appearance: She is well-developed  HENT:      Head: Normocephalic and atraumatic  Right Ear: Tympanic membrane normal       Left Ear: Tympanic membrane normal       Nose: Nose normal       Mouth/Throat:      Mouth: Mucous membranes are moist    Eyes:      Pupils: Pupils are equal, round, and reactive to light  Neck:      Thyroid: No thyromegaly  Cardiovascular:      Rate and Rhythm: Normal rate and regular rhythm  Heart sounds: Normal heart sounds  No murmur heard  Pulmonary:      Effort: Pulmonary effort is normal  No respiratory distress  Breath sounds: Normal breath sounds  No wheezing  Abdominal:      General: Bowel sounds are normal       Palpations: Abdomen is soft  Musculoskeletal:         General: Normal range of motion  Cervical back: Normal range of motion  Skin:     General: Skin is warm and dry     Neurological: General: No focal deficit present  Mental Status: She is alert and oriented to person, place, and time  Psychiatric:         Mood and Affect: Mood normal          Behavior: Behavior normal          Thought Content:  Thought content normal          Judgment: Judgment normal

## 2022-08-22 DIAGNOSIS — Z13.220 SCREENING FOR LIPID DISORDERS: Primary | ICD-10-CM

## 2022-08-22 LAB
ATRIAL RATE: 77 BPM
ATRIAL RATE: 94 BPM
P AXIS: 45 DEGREES
P AXIS: 68 DEGREES
PR INTERVAL: 128 MS
PR INTERVAL: 148 MS
QRS AXIS: 10 DEGREES
QRS AXIS: 32 DEGREES
QRSD INTERVAL: 80 MS
QRSD INTERVAL: 80 MS
QT INTERVAL: 320 MS
QT INTERVAL: 360 MS
QTC INTERVAL: 400 MS
QTC INTERVAL: 407 MS
T WAVE AXIS: 48 DEGREES
T WAVE AXIS: 73 DEGREES
VENTRICULAR RATE: 77 BPM
VENTRICULAR RATE: 94 BPM

## 2022-08-22 PROCEDURE — 93010 ELECTROCARDIOGRAM REPORT: CPT | Performed by: INTERNAL MEDICINE

## 2022-09-06 ENCOUNTER — OFFICE VISIT (OUTPATIENT)
Dept: FAMILY MEDICINE CLINIC | Facility: CLINIC | Age: 62
End: 2022-09-06

## 2022-09-06 VITALS
DIASTOLIC BLOOD PRESSURE: 74 MMHG | HEART RATE: 68 BPM | WEIGHT: 170 LBS | OXYGEN SATURATION: 97 % | BODY MASS INDEX: 30.12 KG/M2 | SYSTOLIC BLOOD PRESSURE: 112 MMHG | HEIGHT: 63 IN

## 2022-09-06 DIAGNOSIS — K21.00 GASTROESOPHAGEAL REFLUX DISEASE WITH ESOPHAGITIS, UNSPECIFIED WHETHER HEMORRHAGE: Primary | ICD-10-CM

## 2022-09-06 PROBLEM — K21.9 GASTROESOPHAGEAL REFLUX DISEASE WITHOUT ESOPHAGITIS: Status: RESOLVED | Noted: 2019-10-03 | Resolved: 2022-09-06

## 2022-09-06 PROBLEM — R09.81 NASAL SINUS CONGESTION: Status: RESOLVED | Noted: 2022-08-19 | Resolved: 2022-09-06

## 2022-09-06 PROCEDURE — 99213 OFFICE O/P EST LOW 20 MIN: CPT | Performed by: NURSE PRACTITIONER

## 2022-09-06 NOTE — ASSESSMENT & PLAN NOTE
Patient will continue with Protonix 40 mg daily and will f/u with GI at the end of the month Dr Chaparro Conde

## 2022-09-06 NOTE — PROGRESS NOTES
Assessment/Plan:         Problem List Items Addressed This Visit        Digestive    Gastroesophageal reflux disease with esophagitis - Primary     Patient will continue with Protonix 40 mg daily and will f/u with GI at the end of the month Dr Nitesh Ramírez                  Subjective:      Patient ID: Jennifer Aly is a 58 y o  female  Patient here today and reports that she traveled to [place] and reports that she is having ongoing pain in her abdomen and having diarrhea and cramping and pain and patient reports that she has only been eating eggs and chicken and rice soup and fish and eggs she has lost 8 pounds while this was happening and she has been tolerating fluids and is exhausted  Patient started the Protonix 40 mg  3 days ago and does seem to be improving with taking the Protonix  Patient had just one BM this morning and no blood in the stools and no vomiting  Patient has a f/u with GI on 9/26  The following portions of the patient's history were reviewed and updated as appropriate:   She  has a past medical history of Abnormal Pap smear of cervix, Cancer ([facility] [address] ) (12/2018), Hearing loss, Kidney stone, Lumbar herniated disc, Migraine, Neck pain, Prediabetes, 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 ) (3/8/2019), Varicella, and Wears glasses    She   Patient Active Problem List    Diagnosis Date Noted    Gastroesophageal reflux disease with esophagitis 08/19/2022    Frequent headaches 08/19/2022    Dizziness 08/19/2022    Urinary frequency 12/31/2020    Plantar fasciitis 10/03/2019    Hyperlipidemia 10/03/2019    Normal gynecologic examination 10/03/2019    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HCC) 03/08/2019    Migraine with aura and without status migrainosus, not intractable 02/07/2019    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 12/27/2018    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    Bilateral hearing loss 11/15/2018    Chronic fatigue 11/15/2018    Arthralgia 11/15/2018    Esophageal stricture 11/15/2018    Dry eyes 11/15/2018    Mouth dryness 11/15/2018    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    Calculus of right kidney 10/15/2015     She  has a past surgical history that includes Tonsillectomy; Tubal ligation; [place] tooth extraction; pr excis tongue lesn (N/A, 1/9/2019); pr removal nodes, neck,cerv mod rad (Bilateral, 3/8/2019); pr excis tongue lesn (Right, 3/8/2019); Hysterectomy (2007); and Oophorectomy (Bilateral, 2007)  Her family history includes Diabetes in her father; Hypertension in her father and mother; No Known Problems in her daughter, maternal grandmother, paternal aunt, paternal aunt, and paternal grandmother  She  reports that she has never smoked  She has never used smokeless tobacco  She reports that she does not drink alcohol and does not use drugs  Current Outpatient Medications   Medication Sig Dispense Refill    ascorbic acid (VITAMIN C) 250 mg tablet Take 250 mg by mouth daily      fluticasone (FLONASE) 50 mcg/act nasal spray 1 spray into each nostril daily 16 mL 1    pantoprazole (PROTONIX) 40 mg tablet Take 1 tablet (40 mg total) by mouth daily before breakfast 30 tablet 1    Vitamin D, Cholecalciferol, 400 units TABS Take by mouth      Zinc Sulfate (ZINC 15 PO) Take by mouth      meclizine (ANTIVERT) 25 mg tablet Take 1 tablet (25 mg total) by mouth every 8 (eight) hours as needed for dizziness for up to 15 days 45 tablet 0     No current facility-administered medications for this visit  She is allergic to albuterol, bee venom, and penicillins       Review of Systems   Constitutional: Negative for activity change, appetite change, chills, diaphoresis, fatigue, fever and unexpected weight change  HENT: Negative for congestion, ear pain, hearing loss, postnasal drip, sinus pressure, sinus pain, sneezing and sore throat  Eyes: Negative for pain, redness and visual disturbance     Respiratory: Negative for cough and shortness of breath  Cardiovascular: Negative for chest pain and leg swelling  Gastrointestinal: Negative for abdominal pain, diarrhea, nausea and vomiting  Endocrine: Negative  Genitourinary: Negative  Musculoskeletal: Negative for arthralgias  Allergic/Immunologic: Negative  Neurological: Negative for dizziness and light-headedness  Hematological: Negative  Psychiatric/Behavioral: Negative for behavioral problems and dysphoric mood  Objective:      /74   Pulse 68   Ht 5' 3" (1 6 m)   Wt 77 1 kg (170 lb)   SpO2 97%   BMI 30 11 kg/m²          Physical Exam  Vitals and nursing note reviewed  Constitutional:       General: She is not in acute distress  Appearance: She is well-developed  HENT:      Head: Normocephalic and atraumatic  Right Ear: Tympanic membrane normal       Left Ear: Tympanic membrane normal       Nose: Nose normal       Mouth/Throat:      Mouth: Mucous membranes are moist    Eyes:      Pupils: Pupils are equal, round, and reactive to light  Neck:      Thyroid: No thyromegaly  Cardiovascular:      Rate and Rhythm: Normal rate and regular rhythm  Heart sounds: Normal heart sounds  No murmur heard  Pulmonary:      Effort: Pulmonary effort is normal  No respiratory distress  Breath sounds: Normal breath sounds  No wheezing  Abdominal:      General: Bowel sounds are normal       Palpations: Abdomen is soft  Tenderness: There is no abdominal tenderness  Musculoskeletal:         General: Normal range of motion  Cervical back: Normal range of motion  Skin:     General: Skin is warm and dry  Neurological:      General: No focal deficit present  Mental Status: She is alert and oriented to person, place, and time  Psychiatric:         Mood and Affect: Mood normal          Behavior: Behavior normal          Thought Content:  Thought content normal          Judgment: Judgment normal

## 2022-09-08 ENCOUNTER — HOSPITAL ENCOUNTER (OUTPATIENT)
Dept: VASCULAR ULTRASOUND | Facility: HOSPITAL | Age: 62
Discharge: HOME | End: 2022-09-08

## 2022-09-08 DIAGNOSIS — R51.9 FREQUENT HEADACHES: ICD-10-CM

## 2022-09-08 PROCEDURE — 93880 EXTRACRANIAL BILAT STUDY: CPT

## 2022-09-08 PROCEDURE — 93880 EXTRACRANIAL BILAT STUDY: CPT | Performed by: SURGERY

## 2022-09-14 ENCOUNTER — EVALUATION (OUTPATIENT)
Dept: PHYSICAL THERAPY | Facility: CLINIC | Age: 62
End: 2022-09-14

## 2022-09-14 DIAGNOSIS — R42 DIZZINESS: Primary | ICD-10-CM

## 2022-09-14 PROCEDURE — 97161 PT EVAL LOW COMPLEX 20 MIN: CPT | Performed by: PHYSICAL THERAPIST

## 2022-09-14 PROCEDURE — 97112 NEUROMUSCULAR REEDUCATION: CPT | Performed by: PHYSICAL THERAPIST

## 2022-09-14 NOTE — PROGRESS NOTES
PT Evaluation     Today's date: 2022  Patient name: Janell Ramirez  : 1960  MRN: [id number]  Referring provider: PHILIPPE Haile  Dx:   Encounter Diagnosis     ICD-10-CM    1  Dizziness  R42 Ambulatory Referral to Physical Therapy       Start Time: 930  Stop Time: 192  Total time in clinic (min): 45 minutes    Assessment  Assessment details: Pt is 59 y/o female presenting to physical therapy with dizziness  Upon examination, pt presents with impairments of decreased ROM of pt's cervical spine as well as impaired balance resulting in limitations of self-care/ADLs, household activities, and ambulation  Pt plan of care will focus on improving pt's vestibular system using habituation and balance training to decrease severity and frequency of pt's symptoms  Pt would benefit from skilled physical therapy to facilitate a return to pt's prior level of function  Impairments: abnormal or restricted ROM, activity intolerance, impaired balance and lacks appropriate home exercise program  Functional limitations: self-care/ADLs, household activities, and ambulation  Symptom irritability: lowUnderstanding of Dx/Px/POC: good   Prognosis: good    Goals  STG - 4 weeks  1  Pt will be independent with her home exercise program  2  Pt will demonstrate normal smooth pursuit tracking to facilitate a return to pt's prior level of function    LTG - 8 weeks  1  Pt will have a subjective decrease in dizziness/vertigo by 2 levels or more during performance of any provoking movements  2  Pt's FOTO score will improve from 52 to 60 or better to facilitate a return to pt's prior level of function      Plan  Patient would benefit from: PT eval and skilled physical therapy  Planned modality interventions: cryotherapy, thermotherapy: hydrocollator packs and unattended electrical stimulation  Planned therapy interventions: activity modification, ADL training, balance, behavior modification, canalith repositioning, functional ROM exercises, flexibility, gait training, graded exercise, home exercise program, joint mobilization, manual therapy, massage, Cooper taping, neuromuscular re-education, patient education, postural training, self care, strengthening, stretching, therapeutic activities and therapeutic exercise  Frequency: 1x week  Duration in weeks: 8  Plan of Care beginning date: 9/14/2022  Plan of Care expiration date: 11/9/2022  Treatment plan discussed with: patient        Subjective Evaluation    History of Present Illness  Date of onset: 4/14/2022  Mechanism of injury: Pt is 57 y/o female presenting to physical therapy with dizziness  Pt reports she had ear infections in April where she was then experiencing symptoms of her rocking back and forth  Pt reports the ear infections cleared up, however, her symptoms worsened over the summer in intensity  Pt reports she would be able to walk normal, however, in her head she felt she was moving all over the place  Pt reports the dizziness only last a few seconds to half a minute as well as flash headaches  Pt reports going to see her primary care physician on 9/5 who referred her for therapy  Pt reports her symptoms improved on Saturday and she has not suffered from any dizziness in the past week  Objective     Concurrent Complaints  Positive for headaches, tinnitus and aural fullness  Negative for nausea/motion sickness, visual change, hearing loss, memory loss, poor concentration and peripheral neuropathy    Active Range of Motion   Cervical/Thoracic Spine       Cervical    Flexion: 25 degrees   Extension: 40 degrees      Left lateral flexion: 30 degrees      Right lateral flexion: 30 degrees      Left rotation: 45 degrees  Right rotation: 45 degrees           Neuro Exam:     Dizziness  Positive for disequilibrium and rocking or swaying  Negative for vertigo, oscillopsia, motion sickness, light-headedness, diplopia and floating or swimming       Exacerbating factors  Positive for looking up, walking and turning head  Negative for bending over, rolling in bed, supine to/from sitting, optokinetic movement and walking in busy environment  Symptoms   Duration: a few to second to 30 seconds  Frequency: once to many times in a day  Intensity at best: 0/10  Intensity at worst: 8/10  Average intensity: 7/10    Headaches   Patient reports headaches: Yes     Frequency: minimal  Duration: very brief  Intensity at best: 0/10  Intensity at worst: 7/10  Average intensity: 7/10    Oculomotor exam   Resting nystagmus: not present   Gaze holding nystagmus: present left and present right  Smooth pursuits: saccadic smooth pursuit  Vertical saccades: normal  Horizontal saccades: normal  Convergence: abnormal  Head thrust: left normal and right normal    Positional testing   Dennis-Hallpike   Left posterior canal: WNL  Right posterior canal: WNL  Roll test   Left horizontal canal: WNL  Right horizontal canal: WNL      Balance assessments   MCTSIB   Eyes open level surface: normal  Eyes open foam surface: abnormal  Eyes closed level surface: abnormal  Eyes closed foam surface: abnormal             Precautions: Hx of Cancer, Hearing loss      Manuals 9/14                                                                Neuro Re-Ed             Education on POC, diagnosis, and HEP 12'            Narrow stance EO/EC airex 3x 20"                                                                             Ther Ex                                                                                                                     Ther Activity                                       Gait Training                                       Modalities

## 2022-09-23 ENCOUNTER — HOSPITAL ENCOUNTER (OUTPATIENT)
Dept: MRI IMAGING | Facility: HOSPITAL | Age: 62
End: 2022-09-23

## 2022-09-23 DIAGNOSIS — R51.9 FREQUENT HEADACHES: ICD-10-CM

## 2022-09-23 PROCEDURE — 70551 MRI BRAIN STEM W/O DYE: CPT

## 2022-09-23 PROCEDURE — G1004 CDSM NDSC: HCPCS

## 2022-09-26 ENCOUNTER — OFFICE VISIT (OUTPATIENT)
Dept: GASTROENTEROLOGY | Facility: CLINIC | Age: 62
End: 2022-09-26

## 2022-09-26 VITALS
BODY MASS INDEX: 30.3 KG/M2 | HEIGHT: 63 IN | OXYGEN SATURATION: 97 % | HEART RATE: 67 BPM | DIASTOLIC BLOOD PRESSURE: 86 MMHG | SYSTOLIC BLOOD PRESSURE: 122 MMHG | WEIGHT: 171 LBS

## 2022-09-26 DIAGNOSIS — R10.30 LOWER ABDOMINAL PAIN: Primary | ICD-10-CM

## 2022-09-26 DIAGNOSIS — K22.2 ESOPHAGEAL STRICTURE: ICD-10-CM

## 2022-09-26 DIAGNOSIS — K21.00 GASTROESOPHAGEAL REFLUX DISEASE WITH ESOPHAGITIS, UNSPECIFIED WHETHER HEMORRHAGE: ICD-10-CM

## 2022-09-26 DIAGNOSIS — Z12.11 SCREENING FOR COLON CANCER: ICD-10-CM

## 2022-09-26 PROCEDURE — 99214 OFFICE O/P EST MOD 30 MIN: CPT | Performed by: INTERNAL MEDICINE

## 2022-09-26 RX ORDER — DICYCLOMINE HCL 20 MG
20 TABLET ORAL EVERY 6 HOURS
Qty: 60 TABLET | Refills: 3 | Status: SHIPPED | OUTPATIENT
Start: 2022-09-26

## 2022-09-26 NOTE — PATIENT INSTRUCTIONS
Scheduled date of EGD/colonoscopy (as of today):12/8/22  Physician performing EGD/colonoscopy:Raman  Location of EGD/colonoscopy:[facility]  Desired bowel prep reviewed with patient:Anil/Miralax  Instructions reviewed with patient by:King cleveland  Clearances:   none

## 2022-09-26 NOTE — PROGRESS NOTES
[facility] - Outpatient Follow-up Note  Barbara López 58 y o  female MRN: [id number]  Encounter: [id number]          ASSESSMENT AND PLAN:      1  Esophageal stricture  - Ambulatory Referral to Gastroenterology    2  Gastroesophageal reflux disease with esophagitis, unspecified whether hemorrhage  - Ambulatory Referral to Gastroenterology  - EGD; Future    3  Lower abdominal pain  - dicyclomine (BENTYL) 20 mg tablet; Take 1 tablet (20 mg total) by mouth every 6 (six) hours  Dispense: 60 tablet; Refill: 3  - Colonoscopy; Future  - will also be giving the a course of probiotic over the next couple of months  I have told her to take either Florastor or align, generic, once a day    4  Screening for colon cancer  - Colonoscopy; Future    ______________________________________________________________________    SUBJECTIVEBarb Bebe comes into the office today stating that her heartburn symptoms are under good control at the present time  She denies any heartburn and she denies any problems with dysphagia  She does however have recurring problems with abdominal pains  These are new pains  She states that recently she was diagnosed with a infection was treated with a course of antibiotics him with 2 courses of steroids, prednisone  When she started taking this medication she began experiencing upper abdominal pain as well as sharp lower abdominal pains  These pains are not associated with a change in bowel habits  She denies diarrhea or constipation  She denies rectal bleeding  She also states that she was supposed to undergo both EGD and colonoscopy in 2019 but this was canceled and then COVID came around and so she has not had these test performed  She would like to have these tests performed sometimes the near future  REVIEW OF SYSTEMS IS OTHERWISE NEGATIVE        Historical Information   Past Medical History:   Diagnosis Date    Abnormal Pap smear of cervix     Cancer ([facility] [address] ) 12/2018    oral    Hearing loss     down 50% in right ear    Kidney stone     6 or 7 yrs ago    Lumbar herniated disc     Migraine     Neck pain     Prediabetes     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    Varicella     Wears glasses      Past Surgical History:   Procedure Laterality Date    HYSTERECTOMY  2007    OOPHORECTOMY Bilateral 2007    KY EXCIS TONGUE LESN N/A 1/9/2019    Procedure: Partial glossectomy with FROZEN SECTION;  Surgeon: Elliot Gibson DO;  [facility];  Service: ENT    KY EXCIS TONGUE LESN Right 3/8/2019    Procedure: LATERAL TONGUE SURGICAL SITE RE-EXCISION WITH FROZEN SECTION ;  Surgeon: Elliot Gibson DO;  [facility];  Service: ENT    KY REMOVAL NODES, NECK,CERV MOD RAD Bilateral 3/8/2019    Procedure: SUPRAOMOHYOID NECK DISSECTION;  Surgeon:  Elliot Gibson DO;  Location[facility];  Service: ENT    TONSILLECTOMY      TUBAL LIGATION      WISDOM TOOTH EXTRACTION       Social History   Social History     Substance and Sexual Activity   Alcohol Use No     Social History     Substance and Sexual Activity   Drug Use No     Social History     Tobacco Use   Smoking Status Never Smoker   Smokeless Tobacco Never Used     Family History   Problem Relation Age of Onset    Hypertension Mother     Diabetes Father     Hypertension Father     No Known Problems Daughter     No Known Problems Maternal Grandmother     No Known Problems Paternal Grandmother     No Known Problems Paternal Aunt     No Known Problems Paternal Aunt     Breast cancer Neg Hx     Endometrial cancer Neg Hx     Ovarian cancer Neg Hx        Meds/Allergies       Current Outpatient Medications:     co-enzyme Q-10 30 MG capsule    dicyclomine (BENTYL) 20 mg tablet    GINKGO BILOBA COMPLEX PO    L-LYSINE PO    Omega-3 Fatty Acids (OMEGA-3 FISH OIL PO)    pantoprazole (PROTONIX) 40 mg tablet    RIBOFLAVIN PO    Vitamin D, Cholecalciferol, 400 units TABS   ascorbic acid (VITAMIN C) 250 mg tablet    meclizine (ANTIVERT) 25 mg tablet    Zinc Sulfate (ZINC 15 PO)    Allergies   Allergen Reactions    Albuterol      tachycardia    Bee Venom Anaphylaxis    Penicillins Anaphylaxis     As a toddler           Objective     Blood pressure 122/86, pulse 67, height 5' 3" (1 6 m), weight 77 6 kg (171 lb), SpO2 97 %, not currently breastfeeding  Body mass index is 30 29 kg/m²  PHYSICAL EXAM:      General Appearance:   Alert, cooperative, no distress   HEENT:   Normocephalic, atraumatic, anicteric      Neck:  Supple, symmetrical, trachea midline   Lungs:   Clear to auscultation bilaterally; no rales, rhonchi or wheezing; respirations unlabored    Heart[de-identified]   Regular rate and rhythm; no murmur, rub, or gallop  Abdomen:   Soft, non-tender, non-distended; normal bowel sounds; no masses, no organomegaly    Genitalia:   Deferred    Rectal:   Deferred    Extremities:  No cyanosis, clubbing or edema    Pulses:  2+ and symmetric    Skin:  No jaundice, rashes, or lesions    Lymph nodes:  No palpable cervical lymphadenopathy        Lab Results:   No visits with results within 1 Day(s) from this visit     Latest known visit with results is:   Admission on 08/16/2022, Discharged on 08/16/2022   Component Date Value    Ventricular Rate 08/16/2022 94     Atrial Rate 08/16/2022 94     LA Interval 08/16/2022 148     QRSD Interval 08/16/2022 80     QT Interval 08/16/2022 320     QTC Interval 08/16/2022 400     P Axis 08/16/2022 68     QRS Axis 08/16/2022 32     T Wave Axis 08/16/2022 73     WBC 08/16/2022 7 96     RBC 08/16/2022 5 18 (A)    Hemoglobin 08/16/2022 15 4     Hematocrit 08/16/2022 46 2 (A)    MCV 08/16/2022 89     MCH 08/16/2022 29 7     MCHC 08/16/2022 33 3     RDW 08/16/2022 14 7     MPV 08/16/2022 10 7     Platelets 79/44/2029 315     nRBC 08/16/2022 0     Neutrophils Relative 08/16/2022 69     Immat GRANS % 08/16/2022 0     Lymphocytes Relative 08/16/2022 24     Monocytes Relative 08/16/2022 7     Eosinophils Relative 08/16/2022 0     Basophils Relative 08/16/2022 0     Neutrophils Absolute 08/16/2022 5 41     Immature Grans Absolute 08/16/2022 0 03     Lymphocytes Absolute 08/16/2022 1 93     Monocytes Absolute 08/16/2022 0 53     Eosinophils Absolute 08/16/2022 0 03     Basophils Absolute 08/16/2022 0 03     Sodium 08/16/2022 140     Potassium 08/16/2022 4 0     Chloride 08/16/2022 103     CO2 08/16/2022 26     ANION GAP 08/16/2022 11     BUN 08/16/2022 12     Creatinine 08/16/2022 1 08     Glucose 08/16/2022 118     Calcium 08/16/2022 9 8     eGFR 08/16/2022 55     Total Bilirubin 08/16/2022 0 54     Bilirubin, Direct 08/16/2022 0 07     Alkaline Phosphatase 08/16/2022 93     AST 08/16/2022 32     ALT 08/16/2022 31     Total Protein 08/16/2022 8 1     Albumin 08/16/2022 3 8     Lipase 08/16/2022 74     Magnesium 08/16/2022 2 2     hs TnI 0hr 08/16/2022 <2     D-Dimer, Quant 08/16/2022 0 42     hs TnI 2hr 08/16/2022 <2     Delta 2hr hsTnI 08/16/2022      Ventricular Rate 08/16/2022 77     Atrial Rate 08/16/2022 77     AZ Interval 08/16/2022 128     QRSD Interval 08/16/2022 80     QT Interval 08/16/2022 360     QTC Interval 08/16/2022 407     P Axis 08/16/2022 45     QRS Axis 08/16/2022 10     T Wave [place] 08/16/2022 48          Radiology Results:   VAS carotid complete study    Result Date: 9/8/2022  Narrative:  [facility] REPORT CLINICAL: Indications: Patient presents with recent episode of dizziness lasting few minutes  She is currently asymptomatic  Operative History: Patient denies previous cardiovascular surgeries Clinical Right Pressure:  110/70 mm Hg, Left Pressure:  115/70 mm Hg  FINDINGS:  Segment      Rig                     Left                      PSV  EDV (cm/s)  Ratio  PSV  EDV (cm/s)  Ratio  Dist  ICA    100          46   1 42   84          46   1 37  Mid   ICA      81          33   1 16 81          43   1 31  Prox  ICA     74          38   1 05   95          43   1 55  Dist CCA      67          31          66          26         Mid CCA       70          20   0 79   61          22   0 64  Prox CCA      88          22          95          33         Ext Carotid   75          13   1 07   85          13   1 38  Prox Vert     57          18          45          18         Subclavian   115          14         140           0            CONCLUSION: Impression RIGHT: There is no evidence of disease throughout the extracranial carotid arteries  Vertebral artery flow is antegrade  There is no significant subclavian artery disease  LEFT: There is no evidence of disease throughout the extracranial carotid arteries  Vertebral artery flow is antegrade  There is no significant subclavian artery disease  Internal carotid artery stenosis determination by consensus criteria from: Yanet Joaquin et al  Carotid Artery Stenosis: Gray-Scale and Doppler US Diagnosis - Society of Radiologists in [facility] [address], Radiology 2003; [id number]  SIGNATURE: Electronically Signed by: Kaylee Dc MD, [address] on 2022-09-08 10:23:30 PM    Answers for HPI/ROS submitted by the patient on 9/19/2022  Chronicity: new  Onset: more than 1 month ago  Onset quality: gradual  Frequency: intermittently  Episode duration: 2 hours  Progression since onset: gradually improving  Pain location: epigastric region  Pain - numeric: 5/10  Pain quality: burning, cramping  Radiates to: suprapubic region, chest  arthralgias: No  belching: Yes  constipation: No  diarrhea: No  dysuria: No  fever: No  flatus: No  frequency: No  headaches: Yes  hematochezia: No  hematuria: No  melena: No  myalgias: No  nausea:  No  weight loss: No  vomiting: No  Aggravated by: eating  Relieved by: being still

## 2022-10-13 DIAGNOSIS — K21.00 GASTROESOPHAGEAL REFLUX DISEASE WITH ESOPHAGITIS, UNSPECIFIED WHETHER HEMORRHAGE: ICD-10-CM

## 2022-10-13 DIAGNOSIS — K21.9 GASTROESOPHAGEAL REFLUX DISEASE WITHOUT ESOPHAGITIS: ICD-10-CM

## 2022-10-13 DIAGNOSIS — K22.2 ESOPHAGEAL STRICTURE: ICD-10-CM

## 2022-10-13 RX ORDER — PANTOPRAZOLE SODIUM 40 MG/1
TABLET, DELAYED RELEASE ORAL
Qty: 30 TABLET | Refills: 1 | Status: SHIPPED | OUTPATIENT
Start: 2022-10-13

## 2022-11-29 ENCOUNTER — HOSPITAL ENCOUNTER (OUTPATIENT)
Dept: MAMMOGRAPHY | Facility: CLINIC | Age: 62
Discharge: HOME | End: 2022-11-29

## 2022-11-29 VITALS — WEIGHT: 171 LBS | HEIGHT: 63 IN | BODY MASS INDEX: 30.3 KG/M2

## 2022-11-29 DIAGNOSIS — Z12.31 ENCOUNTER FOR SCREENING MAMMOGRAM FOR MALIGNANT NEOPLASM OF BREAST: ICD-10-CM

## 2022-12-07 DIAGNOSIS — K22.2 ESOPHAGEAL STRICTURE: ICD-10-CM

## 2022-12-07 DIAGNOSIS — K21.00 GASTROESOPHAGEAL REFLUX DISEASE WITH ESOPHAGITIS, UNSPECIFIED WHETHER HEMORRHAGE: ICD-10-CM

## 2022-12-07 DIAGNOSIS — K21.9 GASTROESOPHAGEAL REFLUX DISEASE WITHOUT ESOPHAGITIS: ICD-10-CM

## 2022-12-07 RX ORDER — PANTOPRAZOLE SODIUM 40 MG/1
TABLET, DELAYED RELEASE ORAL
Qty: 30 TABLET | Refills: 1 | Status: SHIPPED | OUTPATIENT
Start: 2022-12-07

## 2023-02-09 DIAGNOSIS — K21.9 GASTROESOPHAGEAL REFLUX DISEASE WITHOUT ESOPHAGITIS: ICD-10-CM

## 2023-02-09 DIAGNOSIS — K21.00 GASTROESOPHAGEAL REFLUX DISEASE WITH ESOPHAGITIS, UNSPECIFIED WHETHER HEMORRHAGE: ICD-10-CM

## 2023-02-09 DIAGNOSIS — K22.2 ESOPHAGEAL STRICTURE: ICD-10-CM

## 2023-02-09 RX ORDER — PANTOPRAZOLE SODIUM 40 MG/1
TABLET, DELAYED RELEASE ORAL
Qty: 30 TABLET | Refills: 1 | Status: SHIPPED | OUTPATIENT
Start: 2023-02-09

## 2023-02-17 ENCOUNTER — TELEPHONE (OUTPATIENT)
Dept: NEUROLOGY | Facility: CLINIC | Age: 63
End: 2023-02-17

## 2023-02-17 NOTE — TELEPHONE ENCOUNTER
Called and spoke to patient to confirm their upcoming appointment with Dr Agustín Rice  Informed patient about arriving in the [place] location 15 minutes prior to their appointment to get checked in and going over chart

## 2023-02-24 ENCOUNTER — TELEPHONE (OUTPATIENT)
Dept: NEUROLOGY | Facility: CLINIC | Age: 63
End: 2023-02-24

## 2023-02-24 ENCOUNTER — OFFICE VISIT (OUTPATIENT)
Dept: NEUROLOGY | Facility: CLINIC | Age: 63
End: 2023-02-24

## 2023-02-24 VITALS
WEIGHT: 181.6 LBS | BODY MASS INDEX: 32.18 KG/M2 | SYSTOLIC BLOOD PRESSURE: 130 MMHG | OXYGEN SATURATION: 99 % | DIASTOLIC BLOOD PRESSURE: 74 MMHG | HEIGHT: 63 IN | HEART RATE: 82 BPM

## 2023-02-24 DIAGNOSIS — G43.109 VERTIGINOUS MIGRAINE: Primary | ICD-10-CM

## 2023-02-24 RX ORDER — ASCORBIC ACID 1000 MG
380 TABLET ORAL 2 TIMES DAILY
COMMUNITY

## 2023-02-24 RX ORDER — ASPIRIN 81 MG/1
81 TABLET ORAL DAILY
COMMUNITY

## 2023-02-24 NOTE — PROGRESS NOTES
Review of Systems   Constitutional: Negative  Negative for appetite change and fever  HENT: Negative  Negative for hearing loss, tinnitus, trouble swallowing and voice change  Eyes: Negative  Negative for photophobia, pain and visual disturbance  Respiratory: Negative  Negative for shortness of breath  Cardiovascular: Negative  Negative for palpitations  Gastrointestinal: Negative  Negative for nausea and vomiting  Endocrine: Negative  Negative for cold intolerance  Genitourinary: Negative  Negative for dysuria, frequency and urgency  Musculoskeletal: Negative  Negative for gait problem, myalgias and neck pain  Skin: Negative  Negative for rash  Allergic/Immunologic: Negative  Neurological: Negative  Negative for dizziness, tremors, seizures, syncope, facial asymmetry, speech difficulty, weakness, light-headedness, numbness and headaches  Swaying motion   Hematological: Negative  Does not bruise/bleed easily  Psychiatric/Behavioral: Negative  Negative for confusion, hallucinations and sleep disturbance  All other systems reviewed and are negative

## 2023-02-24 NOTE — PROGRESS NOTES
[facility] [facility] Consult - Follow up   PATIENT:  Dmitry Maloney  MRN:  [id number]  :  1960  DATE OF SERVICE:  2023  REFERRED BY: Self, Referral  PMD: PHILIPPE Walker    Assessment/Plan:   Dmitry Maloney is a delightful 58 y o  female with a past medical history of migraine with aura, right ear hearing loss, 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 in 2019, hemispheric carotid artery syndrome, arthralgia, chronic fatigue, kidney stone, GERD, dizziness, dry eyes, esophageal stricture, lumbar herniated disc, leukoplakia of tongue, hyperlipidemia, neck pain, planter fasciitis, prediabetes from history, fatty liver, hiatal hernia, urinary frequency, referred here for evaluation of headache  Migraine with aura   Feeling of swaying -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 vs resolving vestibular neuritis vs persistent postural perceptual dizziness  She reports a long history of headaches dating back to age 9 that have waxed and waned over her lifetime  She reports she will have long periods of time where she does not have migraines, she can go months or up to a year without one, but typically has 12-15 a year  As of initial visit 2019, she reported new features to her visual aura - visual auras in the past where her vision would tunnel down and worked its way back up, but in 2018 she had complete loss of vision for about 30 sec followed by slowly coming back in reversed tunnel vision  She also had vertical diplopia with this episode  Typical sensory aura with right hand and arm numbness with this episode  Language auras which started about 10 years ago, where her speech is not clear, words come out in the wrong order, which also happened with this most recent episode  No history of hemiplegic weakness, however does feel generally "weak" all over/fatigue type - not true weakness   over the last few years, she has had more of these auras without subsequent migraine, but she also admits that she often goes to bed and sleeps  -As of 2/7/2019: 12-15 a year, at that time recommended MRI brain, carotid ultrasound to further evaluate as well as headache preventative supplements  -As of 2/24/2023: She reports she is taking headache preventative supplements and returns over 3 years later with rare headaches maybe once a year although coincidentally has 1 today mildly  She is not interested in further medications for these  We reviewed her MRI brain  She returns with episodic "swaying feeling" which we discussed sounds like possibly was triggered by a ear infection prior to August and we discussed could have been vestibular neuritis gradually improving causing vertiginous symptoms  No signs of BPPV on PT evaluation  We also discussed another possibility would be vertiginous migraine which would be treated the same as any migraine and could consider adding butterbur for prevention  We also discussed sounds like mal de debarquement, but she believes it started before her flight to [place]  Workup:  - she reports in 2013 she had a workup including MRI brain, carotid ultrasound, TTE that she thinks were unremarkable  -MRI brain without contrast 9/23/2022: Minor white matter change within the frontal lobes suggestive of mild chronic microangiopathy    No acute ischemia  - Carotid ultrasound 9/8/2022: normal    Preventative:  - we discussed headache hygiene and lifestyle factors that may improve headaches  -Continue headache preventative supplements magnesium, riboflavin and could consider adding butterbur or combo pill  - Currently on through other providers: Supplements  - Past/ failed/contraindicated: Amitriptyline would be contraindicated due to age  - future options: Venlafaxine, gabapentin, CGRP med, botox    Rescue:  - recommend not taking over-the-counter or prescription pain medications more than 3 days per week to prevent medication overuse/rebound headache  - Currently on through other providers: Meclizine  - Past/ failed/contraindicated: OTC meds  - future options:  Triptan,  prochlorperazine, Toradol IM or p o , could consider trial of 5 days of Depakote 500 mg nightly or dexamethasone 2 mg daily for prolonged migraine, ubrelvy, reyvow, nurtec      Patient instructions        If this became bothersome again, consider ENT follow up or I could see if could order VNG  Petra called sleep cycle - just the free part  -let me know if you would want to consider sleep study    Headache/migraine treatment:   Abortive medications (for immediate treatment of a headache): It is ok to take ibuprofen, acetaminophen or naproxen (Advil, Tylenol,  Aleve, Excedrin) if they help your headaches you should limit these to No more than 3 times a week to avoid medication overuse/rebound headaches  Over the counter preventive supplements for headaches/migraines (if you try, try for 3 months straight)  (to take every day to help prevent headaches - not to take at the time of headache): There are combo pills online of these - none of which regulated by FDA and double check dosing - take appropriate dose only once a day- *preventa migraine, migravent, mind ease, migrelief   [x] Magnesium 400mg daily (If any diarrhea or upset stomach, decrease dose  as tolerated)  [x] Riboflavin (Vitamin B2) 400mg daily - try online   (FYI B2 may make your urine bright/neon yellow)  AND/OR  [] Herbal medication: Petasites/Butterbur 150 mg daily - try online  (When choosing your Butterbur online or in the store, beware that there are some poor preps containing pyrrolizidine alkaloids (PAs) that can be harmful to the liver  Therefore, do not use butterbur products that are not labeled as PA-free )      Lifestyle Recommendations:  [x] SLEEP - Maintain a regular sleep schedule: Adults need at least 7-8 hours of uninterrupted a night   Maintain good sleep hygiene:  Going to bed and waking up at consistent times, avoiding excessive daytime naps, avoiding caffeinated beverages in the evening, avoid excessive stimulation in the evening and generally using bed primarily for sleeping  One hour before bedtime would recommend turning lights down lower, decreasing your activity (may read quietly, listen to music at a low volume)  When you get into bed, should eliminate all technology (no texting, emailing, playing with your phone, iPad or tablet in bed)  [x] HYDRATION - Maintain good hydration  Drink  2L of fluid a day (4 typical small water bottles)  [x] DIET - Maintain good nutrition  In particular don't skip meals and try and eat healthy balanced meals regularly  [x] TRIGGERS - Look for other triggers and avoid them: Limit caffeine to 1-2 cups a day or less  Avoid dietary triggers that you have noticed bring on your headaches (this could include aged cheese, peanuts, MSG, aspartame and nitrates)  [x] EXERCISE - physical exercise as we all know is good for you in many ways, and not only is good for your heart, but also is beneficial for your mental health, cognitive health and  chronic pain/headaches  I would encourage at the least 5 days of physical exercise weekly for at least 30 minutes  Education and Follow-up  [x] Please call with any questions or concerns  [x] follow up 2-3 months, sooner if needed       CC: We had the pleasure of evaluating Kaden Box in neurological consultation today  Kaden Bxo is a 62 y o    right handed female who presents today for evaluation of headaches       History of Present Illness:   Interval history as of 2/24/2023  - she returns 3 years later, with swaying   - imaging of the spine with chiropractor, trying to upload discs  - 8/2022 - in ED for 7 hours for chest pain, improved with mylanta with lidocaine in it, all the organs looked fine  - sleeps 8 hours, feels rested during the day     Headaches and migraines   "sinus headaches" Rarely, one today, once a year    She comes in today with swaying feeling  Noticed it the most in August, started before she flew to [place] and while there worsened and had bad abdominal pain and it was an ear infection the week before and they gave her antibiotics that didn't clear it up, and then went back to PCP who gave her another round of prednisone and then came back after laying on couch for 8 days with abdominal pain and added probiotic and better    Waxes and wanes by the day  Sometimes not at all for a week  Sometimes bad day where it is every hour  Hard to describe  Almost like you are standing on a dock to get on a boat, not spinning and not actually swaying but feels like it side to side slightly  Lasts 5 seconds, up to a minute and goes away on its own  Will get but doesn't think related, right forehead brief jabs, once every month or two   No known association  - thought at first   Never while laying down   PT did a bunch of tests, back then was having aural fullness  Not seen ENT since 2019  Gotten so used to it, doesn't notice it       "Pt is 57 y/o female presenting to physical therapy with dizziness  Pt reports she had ear infections in April where she was then experiencing symptoms of her rocking back and forth  Pt reports the ear infections cleared up, however, her symptoms worsened over the summer in intensity  Pt reports she would be able to walk normal, however, in her head she felt she was moving all over the place  Pt reports the dizziness only last a few seconds to half a minute as well as flash headaches  Pt reports going to see her primary care physician on 9/5 who referred her for therapy   Pt reports her symptoms improved on Saturday and she has not suffered from any dizziness in the past week "    History as of initial visit 2/7/19:  History of migraines, tongue squamous cell cancer on right ventral side of tongue and floor of mouth    What is your current pain level - 0    Headaches started at what age? 9years old  - changed over time  How often do the headaches occur? Has not had one since [place]  Will have long periods of time where she has migraines - Can go months to a year without one   12-15 a year     - in the last two years some new features:   -In sept was sitting talking to a patient and both eye completely black, could see nothing for max 30 seconds, and then slowly came back in reverse tunnel vision, then had vertical double vision, right hand and arm numbness (like usual) and felt spacey, and then he said she was talking weird and he could not understand her because it sounds like she is talking in a different language (words come out in wrong order), and she went white - took 4 exedrin, did not get a headache, lasted maybe 30 minutes  - another time could not find the 5 on the phone - could not dial a number she was looking at - lasted maybe an hour  - overall feeling weak  - after these episodes go to bed, wakes up without symptoms except for fog for a it after   - the last couple over the last couple of years does not get head pain     (before would tunnel down and work its way up, never complete loss before, in the last 10 years the language aura is new, always had the sensory aura)    - thinks she had a TTE, Carotid US and MRI back in 2013     What time of the day do the headaches start? no particular time of day  How long do the headaches last? 1-2 hours aura, head pain lasts a few hours, never more than 12 hours  Are you ever headache free? Yes    Aura? with aura - see above - visual, language, sensory auras    Describe your usual headache pain quality and location? Pain varies, starts mid frontal or left frontal, sometimes creep up from back, rarely on the right   What is the intensity of pain?  10  Associated symptoms:   [x] Nausea       [] Vomiting        [] Diarrhea  [] Insomnia     [] Stiff or sore neck   [x] Problems with concentration  [x] Photophobia     [x]Phonophobia      [x] Osmophobia  [x] Blurred vision   [x] Prefer quiet, dark room  [x] Light-headed  [x] Tinnitus   [x] Hands or feet tingle or feel numb/paresthesias  - aura     [] Red ear      [] Ptosis      [] Facial droop  [] Lacrimation  [] Nasal congestion/rhinorrhea   [] Flushing of face  [] Change in pupil size      Headache triggers:  None noticed, except for pizza/ or spagetti two days in a row      Have you seen someone else for headaches or pain? Yes, last saw neurology Dr Holt Serum   Have you had trigger point injection performed and how often? No  Have you had Botox injection performed and how often? No   Have you had epidural injections or transforaminal injections performed? No  Have you used CBD or THC for your headaches and how often? No  Are you current pregnant or planning on getting pregnant? No  Have you ever had any Brain imaging? Yes     What medications do you take or have you taken for your headaches?    ABORTIVE:    exedrin    PREVENTIVE:   No     Magnesium at 1000 mg and then 500 mg caused major diarrhea     Alternative therapies used in the past for headaches? chiropractic care once a month for neck disc issue, massage every 2 month     Neck pain?: no    LIFESTYLE  Sleep - averages 8 hours    Physical activity: not recently   Water: 4 bottles       Mood: no history of anxiety or depression       The following portions of the patient's history were reviewed and updated as appropriate: allergies, current medications, past family history, past medical history, past social history, past surgical history and problem list     Past Medical History:     Past Medical History:   Diagnosis Date   • Abnormal Pap smear of cervix    • Cancer ([facility] ) 12/2018    oral   • Hearing loss     down 50% in right ear   • Kidney stone     6 or 7 yrs ago   • Lumbar herniated disc    • Migraine    • Neck pain    • Prediabetes    • 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 ) 3/8/2019   • Varicella • Wears glasses        Patient Active Problem List   Diagnosis   • Leukoplakia of tongue   • Bilateral hearing loss   • Chronic fatigue   • Arthralgia   • Esophageal stricture   • Dry eyes   • Mouth dryness   • 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   • Hemispheric carotid artery syndrome   • Calculus of right kidney   • Migraine with aura and without status migrainosus, not intractable   • 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HCC)   • Plantar fasciitis   • Hyperlipidemia   • Normal gynecologic examination   • Urinary frequency   • Gastroesophageal reflux disease with esophagitis   • Frequent headaches   • Dizziness       Medications:      Current Outpatient Medications   Medication Sig Dispense Refill   • ascorbic acid (VITAMIN C) 250 mg tablet Take 250 mg by mouth 2 (two) times a day     • aspirin (ECOTRIN LOW STRENGTH) 81 mg EC tablet Take 81 mg by mouth daily     • Cholecalciferol (D3 2000 PO) Take 2,000 Units by mouth in the morning     • co-enzyme Q-10 30 MG capsule Take 400 mg by mouth daily     • cyanocobalamin (VITAMIN B-12) 1000 MCG tablet Take 1,000 mcg by mouth daily     • ELDERBERRY PO Take 100 mcg by mouth 2 (two) times a day     • Feverfew 380 MG CAPS Take 380 mg by mouth 2 (two) times a day     • Jenny, Zingiber officinalis, (JENNY ROOT PO) Take 540 mg by mouth 2 (two) times a day     • GINKGO BILOBA COMPLEX PO Take 60 mg by mouth 2 (two) times a day     • Green Tea 315 MG CAPS Take 315 mg by mouth 2 (two) times a day     • L-LYSINE PO Take by mouth     • Lactobacillus (ACIDOPHILUS PO) Take 1 mL by mouth in the morning     • milk thistle 175 MG tablet Take 175 mg by mouth 2 (two) times a day     • NON FORMULARY Take 475 mg by mouth 2 (two) times a day Quercitin     • Omega-3 Fatty Acids (OMEGA-3 FISH OIL PO) Take 1,200 mg by mouth 2 (two) times a day     • pantoprazole (PROTONIX) 40 mg tablet TAKE 1 TABLET BY MOUTH EVERY DAY BEFORE BREAKFAST 30 tablet 1   • PREBIOTIC PRODUCT PO Take by mouth     • Probiotic Product (PROBIOTIC DAILY PO) Take by mouth     • RIBOFLAVIN PO Take 100 mg by mouth 2 (two) times a day     • TURMERIC PO Take by mouth 2 (two) times a day     • Zinc Sulfate (ZINC 15 PO) Take 50 mg by mouth in the morning     • dicyclomine (BENTYL) 20 mg tablet Take 1 tablet (20 mg total) by mouth every 6 (six) hours (Patient not taking: Reported on 2/24/2023) 60 tablet 3   • meclizine (ANTIVERT) 25 mg tablet Take 1 tablet (25 mg total) by mouth every 8 (eight) hours as needed for dizziness for up to 15 days 45 tablet 0   • Vitamin D, Cholecalciferol, 400 units TABS Take by mouth (Patient not taking: Reported on 2/24/2023)       No current facility-administered medications for this visit  Allergies:       Allergies   Allergen Reactions   • Albuterol      tachycardia   • Bee Venom Anaphylaxis   • Penicillins Anaphylaxis     As a toddler       Family History:     Family History   Problem Relation Age of Onset   • Hypertension Mother    • Diabetes Father    • Hypertension Father    • Liver cancer Father    • No Known Problems Paternal Aunt    • No Known Problems Paternal Aunt    • No Known Problems Maternal Grandmother    • No Known Problems Paternal Grandmother    • No Known Problems Daughter    • Breast cancer Neg Hx    • Endometrial cancer Neg Hx    • Ovarian cancer Neg Hx        Social History:     Social History     Socioeconomic History   • Marital status: /Civil Union     Spouse name: Not on file   • Number of children: Not on file   • Years of education: Not on file   • Highest education level: Not on file   Occupational History   • Not on file   Tobacco Use   • Smoking status: Never   • Smokeless tobacco: Never   Vaping Use   • Vaping Use: Never used   Substance and Sexual Activity   • Alcohol use: No   • Drug use: No   • Sexual activity: Yes     Partners: Male   Other Topics Concern   • Not on file   Social History Narrative    Daily caffeine    - None Social Determinants of Health     Financial Resource Strain: Not on file   Food Insecurity: Not on file   Transportation Needs: Not on file   Physical Activity: Not on file   Stress: Not on file   Social Connections: Not on file   Intimate Partner Violence: Not on file   Housing Stability: Not on file         Objective:       Physical Exam:                                                                 Vitals:            Constitutional:    /74 (BP Location: Left arm, Patient Position: Sitting, Cuff Size: Large)   Pulse 82   Ht 5' 3" (1 6 m)   Wt 82 4 kg (181 lb 9 6 oz)   SpO2 99%   BMI 32 17 kg/m²   BP Readings from Last 3 Encounters:   02/24/23 130/74   09/26/22 122/86   09/06/22 112/74     Pulse Readings from Last 3 Encounters:   02/24/23 82   09/26/22 67   09/06/22 68         Well developed, well nourished, well groomed  No dysmorphic features  Psychiatric:  Normal behavior and appropriate affect        Neurological Examination:     Mental status/cognitive function:   Recent and remote memory intact  Attention span and concentration as well as fund of knowledge are appropriate for age  Normal language and spontaneous speech  Cranial Nerves:  II-visual fields full  Fundi poorly visualized due to pupillary constriction  III, IV, VI-Pupils were equal, round, and reactive to light and accommodation  Extraocular movements were full and conjugate without nystagmus  V-facial sensation symmetric  VII-facial expression symmetric, intact forehead wrinkle, strong eye closure, symmetric smile    VIII-hearing grossly intact bilaterally   IX, X-palate elevation symmetric, no dysarthria  XI-shoulder shrug strength intact    XII-tongue protrusion midline  Motor Exam: symmetric bulk and tone throughout, no pronator drift  Power/strength 5/5 bilateral upper and lower extremities, no atrophy, fasciculations or abnormal movements noted     - decrease ROM right arm with residual weakness from past surgery  Sensory: grossly intact light touch in all extremities  Reflexes: brachioradialis 1+, biceps 1+, knee 2+, ankle 1+ bilaterally  No ankle clonus   Coordination: Finger nose finger intact bilaterally, no apparent dysmetria, ataxia or tremor noted  Gait: steady casual and tandem gait  Pertinent lab results:   See EMR for recent labs   11/24/2018:  CMP and CBC unremarkable  TSH 2 17     11/24/2018:       Imaging: I have personally reviewed imaging and radiology read   - MRI Brain 3/11/13 - told was normal   -MRI brain without contrast 9/23/2022: Minor white matter change within the frontal lobes suggestive of mild chronic microangiopathy  No acute ischemia  - Carotid ultrasound 9/8/2022: normal    She reports she had a complete cardiac work-up recently as of 2/24/2023 that was unremarkable      Review of Systems:   ROS obtained by medical assistant Personally reviewed and updated if indicated  I recommended PCP follow up for non neurologic problems  Review of Systems   Constitutional: Negative  Negative for appetite change and fever  HENT: Negative  Negative for hearing loss, tinnitus, trouble swallowing and voice change  Eyes: Negative  Negative for photophobia, pain and visual disturbance  Respiratory: Negative  Negative for shortness of breath  Cardiovascular: Negative  Negative for palpitations  Gastrointestinal: Negative  Negative for nausea and vomiting  Endocrine: Negative  Negative for cold intolerance  Genitourinary: Negative  Negative for dysuria, frequency and urgency  Musculoskeletal: Negative  Negative for gait problem, myalgias and neck pain  Skin: Negative  Negative for rash  Allergic/Immunologic: Negative  Neurological: Negative  Negative for dizziness, tremors, seizures, syncope, facial asymmetry, speech difficulty, weakness, light-headedness, numbness and headaches  Swaying motion   Hematological: Negative    Does not bruise/bleed easily  Psychiatric/Behavioral: Negative  Negative for confusion, hallucinations and sleep disturbance  All other systems reviewed and are negative  I have spent 50 minutes with Patient  today in which greater than 50% of this time was spent in counseling/coordination of care regarding Diagnostic results, Prognosis, Risks and benefits of tx options, Instructions for management, Importance of tx compliance, Risk factor reductions, Impressions, Counseling / Coordination of care, Documenting in the medical record, Reviewing / ordering tests, medicine, procedures   and Obtaining or reviewing history    I also spent 12 minutes non face to face for this patient the same day         Author:  Jesus Alberto Robb MD 2/24/2023 6:35 PM

## 2023-02-24 NOTE — PATIENT INSTRUCTIONS
If this became bothersome again, consider ENT follow up or I could see if could order VNG  Petra called sleep cycle - just the free part    Headache/migraine treatment:   Abortive medications (for immediate treatment of a headache): It is ok to take ibuprofen, acetaminophen or naproxen (Advil, Tylenol,  Aleve, Excedrin) if they help your headaches you should limit these to No more than 3 times a week to avoid medication overuse/rebound headaches  Over the counter preventive supplements for headaches/migraines (if you try, try for 3 months straight)  (to take every day to help prevent headaches - not to take at the time of headache): There are combo pills online of these - none of which regulated by FDA and double check dosing - take appropriate dose only once a day- *preventa migraine, migravent, mind ease, migrelief   [x] Magnesium 400mg daily (If any diarrhea or upset stomach, decrease dose  as tolerated)  [x] Riboflavin (Vitamin B2) 400mg daily - try online   (FYI B2 may make your urine bright/neon yellow)  AND/OR  [] Herbal medication: Petasites/Butterbur 150 mg daily - try online  (When choosing your Butterbur online or in the store, beware that there are some poor preps containing pyrrolizidine alkaloids (PAs) that can be harmful to the liver  Therefore, do not use butterbur products that are not labeled as PA-free )      Lifestyle Recommendations:  [x] SLEEP - Maintain a regular sleep schedule: Adults need at least 7-8 hours of uninterrupted a night  Maintain good sleep hygiene:  Going to bed and waking up at consistent times, avoiding excessive daytime naps, avoiding caffeinated beverages in the evening, avoid excessive stimulation in the evening and generally using bed primarily for sleeping  One hour before bedtime would recommend turning lights down lower, decreasing your activity (may read quietly, listen to music at a low volume)   When you get into bed, should eliminate all technology (no texting, emailing, playing with your phone, iPad or tablet in bed)  [x] HYDRATION - Maintain good hydration  Drink  2L of fluid a day (4 typical small water bottles)  [x] DIET - Maintain good nutrition  In particular don't skip meals and try and eat healthy balanced meals regularly  [x] TRIGGERS - Look for other triggers and avoid them: Limit caffeine to 1-2 cups a day or less  Avoid dietary triggers that you have noticed bring on your headaches (this could include aged cheese, peanuts, MSG, aspartame and nitrates)  [x] EXERCISE - physical exercise as we all know is good for you in many ways, and not only is good for your heart, but also is beneficial for your mental health, cognitive health and  chronic pain/headaches  I would encourage at the least 5 days of physical exercise weekly for at least 30 minutes  Education and Follow-up  [x] Please call with any questions or concerns     [x] follow up 2-3 months, sooner if needed

## 2023-05-10 DIAGNOSIS — K21.00 GASTROESOPHAGEAL REFLUX DISEASE WITH ESOPHAGITIS, UNSPECIFIED WHETHER HEMORRHAGE: ICD-10-CM

## 2023-05-10 DIAGNOSIS — K22.2 ESOPHAGEAL STRICTURE: ICD-10-CM

## 2023-05-10 DIAGNOSIS — K21.9 GASTROESOPHAGEAL REFLUX DISEASE WITHOUT ESOPHAGITIS: ICD-10-CM

## 2023-05-10 RX ORDER — PANTOPRAZOLE SODIUM 40 MG/1
40 TABLET, DELAYED RELEASE ORAL
Qty: 30 TABLET | Refills: 1 | Status: SHIPPED | OUTPATIENT
Start: 2023-05-10

## 2023-05-30 ENCOUNTER — OFFICE VISIT (OUTPATIENT)
Dept: FAMILY MEDICINE CLINIC | Facility: CLINIC | Age: 63
End: 2023-05-30

## 2023-05-30 ENCOUNTER — HOSPITAL ENCOUNTER (OUTPATIENT)
Dept: CT IMAGING | Facility: HOSPITAL | Age: 63
Discharge: HOME | End: 2023-05-30

## 2023-05-30 VITALS
DIASTOLIC BLOOD PRESSURE: 84 MMHG | SYSTOLIC BLOOD PRESSURE: 132 MMHG | BODY MASS INDEX: 31.89 KG/M2 | HEART RATE: 71 BPM | HEIGHT: 63 IN | WEIGHT: 180 LBS | OXYGEN SATURATION: 98 %

## 2023-05-30 DIAGNOSIS — R10.84 GENERALIZED ABDOMINAL PAIN: Primary | ICD-10-CM

## 2023-05-30 DIAGNOSIS — R10.84 GENERALIZED ABDOMINAL PAIN: ICD-10-CM

## 2023-05-30 PROBLEM — R10.11 RIGHT UPPER QUADRANT ABDOMINAL PAIN: Status: ACTIVE | Noted: 2023-05-30

## 2023-05-30 PROBLEM — R10.11 RIGHT UPPER QUADRANT ABDOMINAL PAIN: Status: RESOLVED | Noted: 2023-05-30 | Resolved: 2023-05-30

## 2023-05-30 RX ADMIN — IOHEXOL 100 ML: 350 INJECTION, SOLUTION INTRAVENOUS at 19:15

## 2023-05-30 NOTE — PROGRESS NOTES
Name: Asya Yi      : 1960      MRN: [id number]  Encounter Provider: PHILIPPE Cortez  Encounter Date: 2023   Encounter department: [address] [place] [address]     1  Generalized abdominal pain  Assessment & Plan:  DW patient will check CT scan and labs to evaluate for acute abdomen/gallbladder vs pancreas issue     Orders:  -     CBC and differential; Future  -     Comprehensive metabolic panel; Future  -     Lipase; Future  -     CT abdomen pelvis w contrast; Future; Expected date: 2023           Subjective      Patient here today and rpeorts that she is not able to eat solid foods and eating bone broth and has been this way for the past week  Patient denies any vomiting or diarrhea Patient is having abdomen pain and feeling bloating  Patient is taking probiotics and taking PPI and just not helping and she is having 7-8 out of 10 abdominal pain Pain is most of the time above her belly button started in the esophagus and now has moved down into her abdomen tried simethicone and having regular BMs     Abdominal Pain  This is a recurrent problem  The current episode started 1 to 4 weeks ago  The onset quality is sudden  The problem occurs daily  The problem has been waxing and waning  The pain is located in the periumbilical region  The pain is at a severity of 7/10  The quality of the pain is burning and a sensation of fullness  The abdominal pain radiates to the chest and back  Associated symptoms include belching, frequency and headaches  Pertinent negatives include no anorexia, arthralgias, constipation, diarrhea, dysuria, fever, flatus, hematochezia, hematuria, melena, myalgias, nausea, vomiting or weight loss  The pain is aggravated by eating  The pain is relieved by nothing  Review of Systems   Constitutional: Negative for fever and weight loss  Gastrointestinal: Positive for abdominal pain   Negative for anorexia, constipation, diarrhea, flatus, hematochezia, melena, nausea and vomiting  Genitourinary: Positive for frequency  Negative for dysuria and hematuria  Musculoskeletal: Negative for arthralgias and myalgias  Neurological: Positive for headaches         Current Outpatient Medications on File Prior to Visit   Medication Sig   • ascorbic acid (VITAMIN C) 250 mg tablet Take 250 mg by mouth 2 (two) times a day   • aspirin (ECOTRIN LOW STRENGTH) 81 mg EC tablet Take 81 mg by mouth daily   • Cholecalciferol (D3 2000 PO) Take 2,000 Units by mouth in the morning   • co-enzyme Q-10 30 MG capsule Take 400 mg by mouth daily   • cyanocobalamin (VITAMIN B-12) 1000 MCG tablet Take 1,000 mcg by mouth daily   • dicyclomine (BENTYL) 20 mg tablet Take 1 tablet (20 mg total) by mouth every 6 (six) hours (Patient not taking: Reported on 2/24/2023)   • ELDERBERRY PO Take 100 mcg by mouth 2 (two) times a day   • Feverfew 380 MG CAPS Take 380 mg by mouth 2 (two) times a day   • Ginger, Zingiber officinalis, (GINGER ROOT PO) Take 540 mg by mouth 2 (two) times a day   • GINKGO BILOBA COMPLEX PO Take 60 mg by mouth 2 (two) times a day   • Green Tea 315 MG CAPS Take 315 mg by mouth 2 (two) times a day   • L-LYSINE PO Take by mouth   • Lactobacillus (ACIDOPHILUS PO) Take 1 mL by mouth in the morning   • meclizine (ANTIVERT) 25 mg tablet Take 1 tablet (25 mg total) by mouth every 8 (eight) hours as needed for dizziness for up to 15 days   • milk thistle 175 MG tablet Take 175 mg by mouth 2 (two) times a day   • NON FORMULARY Take 475 mg by mouth 2 (two) times a day Quercitin   • Omega-3 Fatty Acids (OMEGA-3 FISH OIL PO) Take 1,200 mg by mouth 2 (two) times a day   • pantoprazole (PROTONIX) 40 mg tablet Take 1 tablet (40 mg total) by mouth daily before breakfast   • PREBIOTIC PRODUCT PO Take by mouth   • Probiotic Product (PROBIOTIC DAILY PO) Take by mouth   • RIBOFLAVIN PO Take 100 mg by mouth 2 (two) times a day   • TURMERIC PO Take by mouth 2 (two) times a day   • Vitamin "D, Cholecalciferol, 400 units TABS Take by mouth (Patient not taking: Reported on 2/24/2023)   • Zinc Sulfate (ZINC 15 PO) Take 50 mg by mouth in the morning       Objective     /84   Pulse 71   Ht 5' 3\" (1 6 m)   Wt 81 6 kg (180 lb)   SpO2 98%   BMI 31 89 kg/m²     Physical Exam  Vitals and nursing note reviewed  Constitutional:       General: She is not in acute distress  Appearance: She is well-developed  HENT:      Head: Normocephalic and atraumatic  Right Ear: Tympanic membrane normal       Left Ear: Tympanic membrane normal       Nose: Nose normal       Mouth/Throat:      Mouth: Mucous membranes are moist    Eyes:      Pupils: Pupils are equal, round, and reactive to light  Neck:      Thyroid: No thyromegaly  Cardiovascular:      Rate and Rhythm: Normal rate and regular rhythm  Heart sounds: Normal heart sounds  No murmur heard  Pulmonary:      Effort: Pulmonary effort is normal  No respiratory distress  Breath sounds: Normal breath sounds  No wheezing  Abdominal:      General: Bowel sounds are normal  There is distension  Palpations: Abdomen is soft  Tenderness: There is abdominal tenderness in the right upper quadrant and epigastric area  Musculoskeletal:         General: Normal range of motion  Cervical back: Normal range of motion  Skin:     General: Skin is warm and dry  Neurological:      Mental Status: She is alert and oriented to person, place, and time         PHILIPPE Olmos  "

## 2023-06-02 DIAGNOSIS — R19.7 DIARRHEA, UNSPECIFIED TYPE: Primary | ICD-10-CM

## 2023-06-03 ENCOUNTER — APPOINTMENT (OUTPATIENT)
Dept: LAB | Facility: CLINIC | Age: 63
End: 2023-06-03

## 2023-06-03 DIAGNOSIS — R10.84 GENERALIZED ABDOMINAL PAIN: ICD-10-CM

## 2023-06-03 LAB
ALBUMIN SERPL BCP-MCNC: 3.8 G/DL (ref 3.5–5)
ALP SERPL-CCNC: 91 U/L (ref 46–116)
ALT SERPL W P-5'-P-CCNC: 37 U/L (ref 12–78)
ANION GAP SERPL CALCULATED.3IONS-SCNC: 1 MMOL/L (ref 4–13)
AST SERPL W P-5'-P-CCNC: 29 U/L (ref 5–45)
BASOPHILS # BLD AUTO: 0.03 THOUSANDS/ÂΜL (ref 0–0.1)
BASOPHILS NFR BLD AUTO: 1 % (ref 0–1)
BILIRUB SERPL-MCNC: 0.47 MG/DL (ref 0.2–1)
BUN SERPL-MCNC: 18 MG/DL (ref 5–25)
CALCIUM SERPL-MCNC: 9.6 MG/DL (ref 8.3–10.1)
CHLORIDE SERPL-SCNC: 106 MMOL/L (ref 96–108)
CO2 SERPL-SCNC: 28 MMOL/L (ref 21–32)
CREAT SERPL-MCNC: 1.02 MG/DL (ref 0.6–1.3)
EOSINOPHIL # BLD AUTO: 0.07 THOUSAND/ÂΜL (ref 0–0.61)
EOSINOPHIL NFR BLD AUTO: 1 % (ref 0–6)
ERYTHROCYTE [DISTWIDTH] IN BLOOD BY AUTOMATED COUNT: 14.3 % (ref 11.6–15.1)
GFR SERPL CREATININE-BSD FRML MDRD: 58 ML/MIN/1.73SQ M
GLUCOSE P FAST SERPL-MCNC: 103 MG/DL (ref 65–99)
HCT VFR BLD AUTO: 45.2 % (ref 34.8–46.1)
HGB BLD-MCNC: 15.1 G/DL (ref 11.5–15.4)
IMM GRANULOCYTES # BLD AUTO: 0.01 THOUSAND/UL (ref 0–0.2)
IMM GRANULOCYTES NFR BLD AUTO: 0 % (ref 0–2)
LIPASE SERPL-CCNC: 112 U/L (ref 73–393)
LYMPHOCYTES # BLD AUTO: 1.92 THOUSANDS/ÂΜL (ref 0.6–4.47)
LYMPHOCYTES NFR BLD AUTO: 30 % (ref 14–44)
MCH RBC QN AUTO: 29.5 PG (ref 26.8–34.3)
MCHC RBC AUTO-ENTMCNC: 33.4 G/DL (ref 31.4–37.4)
MCV RBC AUTO: 88 FL (ref 82–98)
MONOCYTES # BLD AUTO: 0.52 THOUSAND/ÂΜL (ref 0.17–1.22)
MONOCYTES NFR BLD AUTO: 8 % (ref 4–12)
NEUTROPHILS # BLD AUTO: 3.82 THOUSANDS/ÂΜL (ref 1.85–7.62)
NEUTS SEG NFR BLD AUTO: 60 % (ref 43–75)
NRBC BLD AUTO-RTO: 0 /100 WBCS
PLATELET # BLD AUTO: 347 THOUSANDS/UL (ref 149–390)
PMV BLD AUTO: 10.8 FL (ref 8.9–12.7)
POTASSIUM SERPL-SCNC: 4.5 MMOL/L (ref 3.5–5.3)
PROT SERPL-MCNC: 7.8 G/DL (ref 6.4–8.4)
RBC # BLD AUTO: 5.12 MILLION/UL (ref 3.81–5.12)
SODIUM SERPL-SCNC: 135 MMOL/L (ref 135–147)
WBC # BLD AUTO: 6.37 THOUSAND/UL (ref 4.31–10.16)

## 2023-06-03 PROCEDURE — 83690 ASSAY OF LIPASE: CPT

## 2023-06-03 PROCEDURE — 85025 COMPLETE CBC W/AUTO DIFF WBC: CPT

## 2023-06-03 PROCEDURE — 36415 COLL VENOUS BLD VENIPUNCTURE: CPT

## 2023-06-03 PROCEDURE — 80053 COMPREHEN METABOLIC PANEL: CPT

## 2023-06-12 ENCOUNTER — OFFICE VISIT (OUTPATIENT)
Dept: URGENT CARE | Facility: CLINIC | Age: 63
End: 2023-06-12
Payer: COMMERCIAL

## 2023-06-12 ENCOUNTER — TELEPHONE (OUTPATIENT)
Dept: FAMILY MEDICINE CLINIC | Facility: CLINIC | Age: 63
End: 2023-06-12

## 2023-06-12 VITALS
SYSTOLIC BLOOD PRESSURE: 159 MMHG | WEIGHT: 177.6 LBS | OXYGEN SATURATION: 98 % | HEART RATE: 71 BPM | DIASTOLIC BLOOD PRESSURE: 79 MMHG | TEMPERATURE: 98.2 F | RESPIRATION RATE: 18 BRPM | BODY MASS INDEX: 31.46 KG/M2

## 2023-06-12 DIAGNOSIS — N39.0 URINARY TRACT INFECTION WITHOUT HEMATURIA, SITE UNSPECIFIED: Primary | ICD-10-CM

## 2023-06-12 LAB
SL AMB  POCT GLUCOSE, UA: NEGATIVE
SL AMB LEUKOCYTE ESTERASE,UA: ABNORMAL
SL AMB POCT BILIRUBIN,UA: NEGATIVE
SL AMB POCT BLOOD,UA: NEGATIVE
SL AMB POCT CLARITY,UA: CLEAR
SL AMB POCT COLOR,UA: YELLOW
SL AMB POCT KETONES,UA: 15
SL AMB POCT NITRITE,UA: NEGATIVE
SL AMB POCT PH,UA: 6.5
SL AMB POCT SPECIFIC GRAVITY,UA: 1.01
SL AMB POCT URINE PROTEIN: NEGATIVE
SL AMB POCT UROBILINOGEN: 0.2

## 2023-06-12 PROCEDURE — 99213 OFFICE O/P EST LOW 20 MIN: CPT | Performed by: PHYSICIAN ASSISTANT

## 2023-06-12 PROCEDURE — 87086 URINE CULTURE/COLONY COUNT: CPT | Performed by: PHYSICIAN ASSISTANT

## 2023-06-12 PROCEDURE — 81002 URINALYSIS NONAUTO W/O SCOPE: CPT | Performed by: PHYSICIAN ASSISTANT

## 2023-06-12 RX ORDER — SULFAMETHOXAZOLE AND TRIMETHOPRIM 800; 160 MG/1; MG/1
1 TABLET ORAL EVERY 12 HOURS SCHEDULED
Qty: 14 TABLET | Refills: 0 | Status: SHIPPED | OUTPATIENT
Start: 2023-06-12 | End: 2023-06-19 | Stop reason: SDUPTHER

## 2023-06-12 NOTE — TELEPHONE ENCOUNTER
Spoke to pt here in the office, she came in and wanted to communicate that she is going to urgent care today to get checked out for the same issues that she was seen by you for on 5/30  She did schedule an appointment for this month but she was wondering if you had any other ideas for what could be causing her symptoms

## 2023-06-12 NOTE — PROGRESS NOTES
[facility]        NAME: Daysi Pelletier is a 61 y o  female  : 1960    MRN: [id number]  DATE: 2023  TIME: 10:06 AM    Assessment and Plan   Urinary tract infection without hematuria, site unspecified [N39 0]  1  Urinary tract infection without hematuria, site unspecified  POCT urine dip    sulfamethoxazole-trimethoprim (BACTRIM DS) 800-160 mg per tablet          CT and labs reviewed  No acute findings  No significant cahnge in sx since performed  Urinalysis shows UTI which corresponds with b/l flank pain radiating into pelvis  Pt appears well, VSS  Will start on bactrim, educated pt to f/u with PCP for this issue  She states she udnerstands and agrees to plan  Patient Instructions     Continue to monitor symptoms  If new or worsening symptoms develop, go immediately to Er  Drink plenty of fluids  Follow up with Family Doctor this week  Chief Complaint     Chief Complaint   Patient presents with   • Abdominal Pain     Patient has 6/10 pain to abdomen that started May 28th  Was sent for CT to check gallbladder next day from PCP  Patient experiences lower back pain at times  Patient reports feeling bladder pressure  No fevers  Occasional diarrhea  History of Present Illness       Abdominal Pain  This is a new problem  Episode onset: Ongoing abdominal pain x 2-4 weeks  Went to PCP and had full workup including stool culture, CMP, CT abdomen pelvis  Everything looks normal except known hepatic steatosis  Pt states she has pain in b/l flanks wrapping into pelvis  The onset quality is gradual  The problem has been waxing and waning  The pain is moderate  The quality of the pain is aching  Associated symptoms include belching, dysuria, frequency and nausea  Pertinent negatives include no constipation, diarrhea, fever, flatus, headaches, hematochezia, hematuria, myalgias or vomiting  Exacerbated by: Prolonged sitting  The pain is relieved by nothing   She has tried nothing for the symptoms  The treatment provided no relief  Review of Systems   Review of Systems   Constitutional: Negative  Negative for chills, fatigue and fever  HENT: Negative  Eyes: Negative  Respiratory: Negative  Negative for chest tightness, shortness of breath and wheezing  Cardiovascular: Negative  Negative for chest pain and palpitations  Gastrointestinal: Positive for abdominal pain and nausea  Negative for constipation, diarrhea, flatus, hematochezia and vomiting  Endocrine: Negative  Genitourinary: Positive for dysuria, frequency and urgency  Negative for flank pain, hematuria, pelvic pain, vaginal bleeding, vaginal discharge and vaginal pain  Musculoskeletal: Negative for back pain, gait problem, myalgias, neck pain and neck stiffness  Skin: Negative  Negative for pallor and rash  Allergic/Immunologic: Negative  Neurological: Negative  Negative for weakness, numbness and headaches  Hematological: Negative  Psychiatric/Behavioral: Negative            Current Medications       Current Outpatient Medications:   •  pantoprazole (PROTONIX) 40 mg tablet, Take 1 tablet (40 mg total) by mouth daily before breakfast, Disp: 30 tablet, Rfl: 1  •  PREBIOTIC PRODUCT PO, Take by mouth, Disp: , Rfl:   •  Probiotic Product (PROBIOTIC DAILY PO), Take by mouth, Disp: , Rfl:   •  sulfamethoxazole-trimethoprim (BACTRIM DS) 800-160 mg per tablet, Take 1 tablet by mouth every 12 (twelve) hours for 7 days, Disp: 14 tablet, Rfl: 0  •  ascorbic acid (VITAMIN C) 250 mg tablet, Take 250 mg by mouth 2 (two) times a day (Patient not taking: Reported on 6/12/2023), Disp: , Rfl:   •  aspirin (ECOTRIN LOW STRENGTH) 81 mg EC tablet, Take 81 mg by mouth daily (Patient not taking: Reported on 6/12/2023), Disp: , Rfl:   •  Cholecalciferol (D3 2000 PO), Take 2,000 Units by mouth in the morning (Patient not taking: Reported on 6/12/2023), Disp: , Rfl:   •  co-enzyme Q-10 30 MG capsule, Take 400 mg by mouth daily (Patient not taking: Reported on 6/12/2023), Disp: , Rfl:   •  cyanocobalamin (VITAMIN B-12) 1000 MCG tablet, Take 1,000 mcg by mouth daily (Patient not taking: Reported on 6/12/2023), Disp: , Rfl:   •  dicyclomine (BENTYL) 20 mg tablet, Take 1 tablet (20 mg total) by mouth every 6 (six) hours (Patient not taking: Reported on 2/24/2023), Disp: 60 tablet, Rfl: 3  •  ELDERBERRY PO, Take 100 mcg by mouth 2 (two) times a day (Patient not taking: Reported on 6/12/2023), Disp: , Rfl:   •  Feverfew 380 MG CAPS, Take 380 mg by mouth 2 (two) times a day (Patient not taking: Reported on 6/12/2023), Disp: , Rfl:   •  Ginger, Zingiber officinalis, (GINGER ROOT PO), Take 540 mg by mouth 2 (two) times a day (Patient not taking: Reported on 6/12/2023), Disp: , Rfl:   •  GINKGO BILOBA COMPLEX PO, Take 60 mg by mouth 2 (two) times a day (Patient not taking: Reported on 6/12/2023), Disp: , Rfl:   •  Green Tea 315 MG CAPS, Take 315 mg by mouth 2 (two) times a day (Patient not taking: Reported on 6/12/2023), Disp: , Rfl:   •  L-LYSINE PO, Take by mouth (Patient not taking: Reported on 6/12/2023), Disp: , Rfl:   •  Lactobacillus (ACIDOPHILUS PO), Take 1 mL by mouth in the morning (Patient not taking: Reported on 6/12/2023), Disp: , Rfl:   •  meclizine (ANTIVERT) 25 mg tablet, Take 1 tablet (25 mg total) by mouth every 8 (eight) hours as needed for dizziness for up to 15 days (Patient not taking: Reported on 6/12/2023), Disp: 45 tablet, Rfl: 0  •  milk thistle 175 MG tablet, Take 175 mg by mouth 2 (two) times a day (Patient not taking: Reported on 6/12/2023), Disp: , Rfl:   •  NON FORMULARY, Take 475 mg by mouth 2 (two) times a day Quercitin (Patient not taking: Reported on 6/12/2023), Disp: , Rfl:   •  Omega-3 Fatty Acids (OMEGA-3 FISH OIL PO), Take 1,200 mg by mouth 2 (two) times a day (Patient not taking: Reported on 6/12/2023), Disp: , Rfl:   •  RIBOFLAVIN PO, Take 100 mg by mouth 2 (two) times a day (Patient not taking: Reported on 6/12/2023), Disp: , Rfl:   •  TURMERIC PO, Take by mouth 2 (two) times a day (Patient not taking: Reported on 6/12/2023), Disp: , Rfl:   •  Vitamin D, Cholecalciferol, 400 units TABS, Take by mouth (Patient not taking: Reported on 2/24/2023), Disp: , Rfl:   •  Zinc Sulfate (ZINC 15 PO), Take 50 mg by mouth in the morning (Patient not taking: Reported on 6/12/2023), Disp: , Rfl:     Current Allergies     Allergies as of 06/12/2023 - Reviewed 06/12/2023   Allergen Reaction Noted   • Albuterol  11/15/2018   • Bee venom Anaphylaxis 05/07/2018   • Penicillins Anaphylaxis 11/15/2018            The following portions of the patient's history were reviewed and updated as appropriate: allergies, current medications, past family history, past medical history, past social history, past surgical history and problem list      Past Medical History:   Diagnosis Date   • Abnormal Pap smear of cervix    • Cancer ([facility] [address] ) 12/2018    oral   • Hearing loss     down 50% in right ear   • Kidney stone     6 or 7 yrs ago   • Lumbar herniated disc    • Migraine    • Neck pain    • Prediabetes    • 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   • Varicella    • Wears glasses        Past Surgical History:   Procedure Laterality Date   • HYSTERECTOMY  2007   • OOPHORECTOMY Bilateral 2007   • AL CERVICAL LYMPHADEC MODIFIED RADICAL NECK DSJ Bilateral 3/8/2019    Procedure: SUPRAOMOHYOID NECK DISSECTION;  Surgeon: Arian Rg DO;  [facility];  Service: ENT   • AL EXCISION LESION TONGUE W/O CLOSURE N/A 1/9/2019    Procedure: Partial glossectomy with FROZEN SECTION;  Surgeon: Arian Rg DO;  Location[facility];  Service: ENT   • AL EXCISION LESION TONGUE W/O CLOSURE Right 3/8/2019    Procedure: LATERAL TONGUE SURGICAL SITE RE-EXCISION WITH FROZEN SECTION ;  Surgeon:  Arian Rg DO;  Location[facility];  Service: ENT   • TONSILLECTOMY     • TUBAL LIGATION     • WISDOM TOOTH EXTRACTION         Family History   Problem Relation Age of Onset   • Hypertension Mother    • Diabetes Father    • Hypertension Father    • Liver cancer Father    • No Known Problems Paternal Aunt    • No Known Problems Paternal Aunt    • No Known Problems Maternal Grandmother    • No Known Problems Paternal Grandmother    • No Known Problems Daughter    • Breast cancer Neg Hx    • Endometrial cancer Neg Hx    • Ovarian cancer Neg Hx          Medications have been verified  Objective   /79   Pulse 71   Temp 98 2 °F (36 8 °C)   Resp 18   Wt 80 6 kg (177 lb 9 6 oz)   SpO2 98%   BMI 31 46 kg/m²        Physical Exam     Physical Exam  Vitals and nursing note reviewed  Constitutional:       General: She is not in acute distress  Appearance: Normal appearance  She is well-developed  She is not ill-appearing or diaphoretic  HENT:      Head: Normocephalic and atraumatic  Cardiovascular:      Rate and Rhythm: Normal rate and regular rhythm  Heart sounds: Normal heart sounds  Pulmonary:      Effort: Pulmonary effort is normal  No respiratory distress  Breath sounds: Normal breath sounds  No wheezing, rhonchi or rales  Abdominal:      General: Abdomen is flat  Bowel sounds are normal  There is no distension  Palpations: Abdomen is soft  Tenderness: There is abdominal tenderness in the right upper quadrant and suprapubic area  There is right CVA tenderness (Mild achiness) and left CVA tenderness (Mild achiness)  There is no guarding  Musculoskeletal:      Cervical back: Normal range of motion and neck supple  Lymphadenopathy:      Cervical: No cervical adenopathy  Skin:     General: Skin is warm  Capillary Refill: Capillary refill takes less than 2 seconds  Coloration: Skin is not pale  Findings: No rash  Neurological:      Mental Status: She is alert

## 2023-06-12 NOTE — PATIENT INSTRUCTIONS
Continue to monitor symptoms  If new or worsening symptoms develop, go immediately to Er  Drink plenty of fluids  Follow up with Family Doctor this week  Urinary Tract Infection in Older Adults   WHAT YOU NEED TO KNOW:   A urinary tract infection (UTI) is caused by bacteria that get inside your urinary tract  Your urinary tract includes your kidneys, ureters, bladder, and urethra  A UTI is more common in your lower urinary tract, which includes your bladder and urethra  DISCHARGE INSTRUCTIONS:   Return to the emergency department if:   You become confused or agitated  You fall down  You are urinating very little or not at all  You are vomiting  You have a high fever with shaking chills  You have side or back pain that gets worse  Call your doctor if:   You have a fever  You are a woman and you have increased white or yellow discharge from your vagina  You do not feel better after 2 days of taking antibiotics  You have questions or concerns about your condition or care  Medicines:   Medicines  help treat the bacterial infection or decrease pain and burning when you urinate  You may also need medicines to decrease the urge to urinate often  If you have UTIs often (called recurrent UTIs), you may be given antibiotics to take regularly  You will be given directions for when and how to use antibiotics  The goal is to prevent UTIs but not cause antibiotic resistance by using antibiotics too often  Take your medicine as directed  Contact your healthcare provider if you think your medicine is not helping or if you have side effects  Tell your provider if you are allergic to any medicine  Keep a list of the medicines, vitamins, and herbs you take  Include the amounts, and when and why you take them  Bring the list or the pill bottles to follow-up visits  Carry your medicine list with you in case of an emergency  Self-care:   Drink liquids as directed    Liquids can help flush bacteria from your urinary tract  Ask how much liquid to drink each day and which liquids are best for you  You may need to drink more liquids than usual to help flush out the bacteria  Do not drink alcohol, caffeine, and citrus juices  These can irritate your bladder and increase your symptoms  Apply heat  on your abdomen for 20 to 30 minutes every 2 hours for as many days as directed  Heat helps decrease discomfort and pressure in your bladder  Prevent a UTI:   Urinate when you feel the urge  Do not hold your urine  Bacteria can grow if urine stays in the bladder too long  It may be helpful to urinate at least every 3 to 4 hours  Urinate after you have sex  This will help flush away bacteria that can enter your urinary tract during sex  Wear cotton underwear and clothes that are loose  Tight pants and nylon underwear can trap moisture and cause bacteria to grow  Drink cranberry juice or take cranberry supplements  These may help prevent UTIs  Your healthcare provider can recommend the right juice or supplement for you  Women should wipe front to back after urinating or having a bowel movement  This may prevent germs from getting into the urinary tract  Do not douche or use feminine deodorants  These can change the chemical balance in your vagina  You may also be given vaginal estrogen medicine  This medicine helps prevent recurrent UTIs in women who have gone through menopause or are in nuvia-menopause  Follow up with your doctor as directed:  Write down your questions so you remember to ask them during your visits  © Copyright [employer] 2022 Information is for End User's use only and may not be sold, redistributed or otherwise used for commercial purposes  The above information is an  only  It is not intended as medical advice for individual conditions or treatments   Talk to your doctor, nurse or pharmacist before following any medical regimen to see if it is safe and effective for you

## 2023-06-15 LAB — BACTERIA UR CULT: NORMAL

## 2023-06-19 DIAGNOSIS — N39.0 URINARY TRACT INFECTION WITHOUT HEMATURIA, SITE UNSPECIFIED: ICD-10-CM

## 2023-06-19 RX ORDER — SULFAMETHOXAZOLE AND TRIMETHOPRIM 800; 160 MG/1; MG/1
1 TABLET ORAL EVERY 12 HOURS SCHEDULED
Qty: 14 TABLET | Refills: 0 | Status: SHIPPED | OUTPATIENT
Start: 2023-06-19 | End: 2023-06-25 | Stop reason: ALTCHOICE

## 2023-06-25 DIAGNOSIS — R10.84 GENERALIZED ABDOMINAL PAIN: Primary | ICD-10-CM

## 2023-06-25 RX ORDER — DICYCLOMINE HCL 20 MG
20 TABLET ORAL EVERY 6 HOURS PRN
Qty: 40 TABLET | Refills: 0 | Status: SHIPPED | OUTPATIENT
Start: 2023-06-25 | End: 2023-07-05

## 2023-06-30 ENCOUNTER — OFFICE VISIT (OUTPATIENT)
Dept: GASTROENTEROLOGY | Facility: CLINIC | Age: 63
End: 2023-06-30

## 2023-06-30 VITALS
DIASTOLIC BLOOD PRESSURE: 86 MMHG | HEIGHT: 63 IN | HEART RATE: 94 BPM | BODY MASS INDEX: 30.3 KG/M2 | OXYGEN SATURATION: 99 % | SYSTOLIC BLOOD PRESSURE: 130 MMHG | WEIGHT: 171 LBS

## 2023-06-30 DIAGNOSIS — R14.0 BLOATING: ICD-10-CM

## 2023-06-30 DIAGNOSIS — R10.84 GENERALIZED ABDOMINAL PAIN: Primary | ICD-10-CM

## 2023-06-30 NOTE — PROGRESS NOTES
[facility] - Outpatient Follow-up Note  Florin Snyder 61 y o  female MRN: [id number]  Encounter: [id number]          ASSESSMENT AND PLAN:      1  Generalized abdominal pain  2  Bloating  Suspect functional abdominal pain related to gas/spasm  Switch probiotic to Align  Dicyclomine PRN  Gas-X with meals  Continue Aloe Vera or Peppermint oil    She notes complete resolution of symptoms while on Bactrim for a UTI  Will trial a Xifaxan course - patient is self pay and so will obtain samples    She has not had an EGD or Colonoscopy - will reschedule    ______________________________________________________________________    SUBJECTIVE: 59-year-old female with a history of migraine headaches and hyperlipidemia who presents for evaluation of abdominal pain and bloating  She was actually seen in the office in September of last year with similar symptoms  At the time she was advised to start a probiotic and scheduled for an EGD and a colonoscopy  She reports that after several days of taking the probiotic the symptoms completely resolved  Unfortunately, she had to cancel the EGD and her colonoscopy as her father suddenly passed away  She notes that she felt well until May when her symptoms returned  She describes the pain as migratory  It seemed to start in the epigastric area but now radiates throughout the abdomen  She notes that when her pain is on the right or the left flank it is more intense and cramping  She has had more of a discomfort type pain in the pelvis as well  For the most part her bowel movements are regular  She does not have severe diarrhea or constipation  She denies any change in the symptom when she has a bowel movement  She does report excessive gaseousness and bloating  Prior to symptom onset she denies any new medications, antibiotics, travel or sick contacts  Since her last appointment she has been taking Florastor consistently    She is also still taking pantoprazole  She notes that due to the severity of the pain she went to urgent care a few weeks ago  She was diagnosed with a UTI and put on a week of Bactrim  She notes that while she took the Bactrim she had absolutely no abdominal symptoms  Within 2 days of stopping the antibiotic her symptoms returned  She did submit a new urine sample to her family doctor who is sending it out for repeat culture  She has no significant urinary symptoms  REVIEW OF SYSTEMS IS OTHERWISE NEGATIVE  Historical Information   Past Medical History:   Diagnosis Date   • Abnormal Pap smear of cervix    • Cancer ([facility] ) 12/2018    oral   • Hearing loss     down 50% in right ear   • Kidney stone     6 or 7 yrs ago   • Lumbar herniated disc    • Migraine    • Neck pain    • Prediabetes    • 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   • Varicella    • Wears glasses      Past Surgical History:   Procedure Laterality Date   • HYSTERECTOMY  2007   • OOPHORECTOMY Bilateral 2007   • MA CERVICAL LYMPHADEC MODIFIED RADICAL NECK DSJ Bilateral 3/8/2019    Procedure: SUPRAOMOHYOID NECK DISSECTION;  Surgeon: Keily De La Cruz DO;  [facility];  Service: ENT   • MA EXCISION LESION TONGUE W/O CLOSURE N/A 1/9/2019    Procedure: Partial glossectomy with FROZEN SECTION;  Surgeon: Keily De La Cruz DO;  [facility];  Service: ENT   • MA EXCISION LESION TONGUE W/O CLOSURE Right 3/8/2019    Procedure: LATERAL TONGUE SURGICAL SITE RE-EXCISION WITH FROZEN SECTION ;  Surgeon:  Keily De La Cruz DO;  [facility];  Service: ENT   • TONSILLECTOMY     • TUBAL LIGATION     • WISDOM TOOTH EXTRACTION       Social History   Social History     Substance and Sexual Activity   Alcohol Use No     Social History     Substance and Sexual Activity   Drug Use No     Social History     Tobacco Use   Smoking Status Never   Smokeless Tobacco Never     Family History   Problem Relation Age of Onset   • Hypertension Mother    • "Diabetes Father    • Hypertension Father    • Liver cancer Father    • No Known Problems Paternal Aunt    • No Known Problems Paternal Aunt    • No Known Problems Maternal Grandmother    • No Known Problems Paternal Grandmother    • No Known Problems Daughter    • Breast cancer Neg Hx    • Endometrial cancer Neg Hx    • Ovarian cancer Neg Hx        Meds/Allergies       Current Outpatient Medications:   •  dicyclomine (BENTYL) 20 mg tablet  •  pantoprazole (PROTONIX) 40 mg tablet  •  Probiotic Product (PROBIOTIC DAILY PO)  •  PREBIOTIC PRODUCT PO    Allergies   Allergen Reactions   • Albuterol      tachycardia   • Bee Venom Anaphylaxis   • Penicillins Anaphylaxis     As a toddler           Objective     Blood pressure 130/86, pulse 94, height 5' 3\" (1 6 m), weight 77 6 kg (171 lb), SpO2 99 %, not currently breastfeeding  Body mass index is 30 29 kg/m²  PHYSICAL EXAM:      General Appearance:   Alert, cooperative, no distress   HEENT:   Normocephalic, atraumatic, anicteric      Neck:  Supple, symmetrical, trachea midline   Lungs:   Clear to auscultation bilaterally; no rales, rhonchi or wheezing; respirations unlabored    Heart[de-identified]   Regular rate and rhythm; no murmur, rub, or gallop  Abdomen:   Soft, non-tender, non-distended; normal bowel sounds; no masses, no organomegaly    Genitalia:   Deferred    Rectal:   Deferred    Extremities:  No cyanosis, clubbing or edema    Pulses:  2+ and symmetric    Skin:  No jaundice, rashes, or lesions    Lymph nodes:  No palpable cervical lymphadenopathy        Lab Results:   No visits with results within 1 Day(s) from this visit     Latest known visit with results is:   Office Visit on 06/12/2023   Component Date Value   • LEUKOCYTE ESTERASE,UA 06/12/2023 small    • NITRITE,UA 06/12/2023 negative    • SL AMB POCT UROBILINOGEN 06/12/2023 0 2    • POCT URINE PROTEIN 06/12/2023 negative    •  PH,UA 06/12/2023 6 5    • BLOOD,UA 06/12/2023 negative    • SPECIFIC GRAVITY,UA " 06/12/2023 1 010    • KETONES,UA 06/12/2023 15    • BILIRUBIN,UA 06/12/2023 negative    • GLUCOSE, UA 06/12/2023 negative    •  COLOR,UA 06/12/2023 yellow    • CLARITY,UA 06/12/2023 clear    • Urine Culture 06/12/2023 20,000-29,000 cfu/ml          Radiology Results:   No results found

## 2023-06-30 NOTE — PATIENT INSTRUCTIONS
Scheduled date of EGD/colonoscopy (as of today):9/12/23  Physician performing EGD/colonoscopy:Raman  Location of EGD/colonoscopy:[facility]  Desired bowel prep reviewed with patient:Anil/Miralax  Instructions reviewed with patient by:King cleveland  Clearances:   none

## 2023-07-03 DIAGNOSIS — K21.00 GASTROESOPHAGEAL REFLUX DISEASE WITH ESOPHAGITIS, UNSPECIFIED WHETHER HEMORRHAGE: ICD-10-CM

## 2023-07-03 DIAGNOSIS — K22.2 ESOPHAGEAL STRICTURE: ICD-10-CM

## 2023-07-03 DIAGNOSIS — K21.9 GASTROESOPHAGEAL REFLUX DISEASE WITHOUT ESOPHAGITIS: ICD-10-CM

## 2023-07-03 RX ORDER — PANTOPRAZOLE SODIUM 40 MG/1
TABLET, DELAYED RELEASE ORAL
Qty: 30 TABLET | Refills: 1 | Status: SHIPPED | OUTPATIENT
Start: 2023-07-03

## 2023-07-18 ENCOUNTER — TELEPHONE (OUTPATIENT)
Dept: NEUROLOGY | Facility: CLINIC | Age: 63
End: 2023-07-18

## 2023-07-18 NOTE — TELEPHONE ENCOUNTER
Patient called back and stated she was self pay. Patient verbalized understanding of self pay policy.

## 2023-07-20 ENCOUNTER — TELEPHONE (OUTPATIENT)
Dept: NEUROLOGY | Facility: CLINIC | Age: 63
End: 2023-07-20

## 2023-07-20 NOTE — TELEPHONE ENCOUNTER
Called patient and left voicemail to confirm their upcoming appointment with Dr. Yari Ortega. Informed patient about calling 15 minutes prior to appointment to get checked in and go over chart.

## 2023-07-24 ENCOUNTER — TELEPHONE (OUTPATIENT)
Dept: NEUROLOGY | Facility: CLINIC | Age: 63
End: 2023-07-24

## 2023-07-28 ENCOUNTER — TELEMEDICINE (OUTPATIENT)
Dept: NEUROLOGY | Facility: CLINIC | Age: 63
End: 2023-07-28

## 2023-07-28 DIAGNOSIS — G43.109 MIGRAINE WITH AURA AND WITHOUT STATUS MIGRAINOSUS, NOT INTRACTABLE: Primary | ICD-10-CM

## 2023-07-28 PROCEDURE — 99214 OFFICE O/P EST MOD 30 MIN: CPT | Performed by: PSYCHIATRY & NEUROLOGY

## 2023-07-28 NOTE — PROGRESS NOTES
Virtual Regular Visit    Verification of patient location: [place]    Patient is located at {Amb Virtual Patient Location:22615} in the following state in which I hold an active license { amb virtual patient location:[address]}      Assessment/Plan:    Problem List Items Addressed This Visit    None           Reason for visit is   Chief Complaint   Patient presents with   • Virtual Regular Visit        Encounter provider Dmitri Victoria MD    Provider located at [address] [place] [id number]      Recent Visits  Date Type Provider Dept   07/24/23 Telephone [facility] [address] [place] [address] recent visits within past 7 days and meeting all other requirements  Future Appointments  No visits were found meeting these conditions. Showing future appointments within next 150 days and meeting all other requirements       The patient was identified by name and date of birth. Gretchen Chaudhary was informed that this is a telemedicine visit and that the visit is being conducted through {AMB VIRTUAL VISIT HUTVVA:24769}. {Telemedicine confidentiality :88481} {Telemedicine participants:14789}  She acknowledged consent and understanding of privacy and security of the video platform. The patient has agreed to participate and understands they can discontinue the visit at any time. Patient is aware this is a billable service. Subjective  Gretchen Chaudhary is a 61 y.o. female *** .       HPI     Past Medical History:   Diagnosis Date   • Abnormal Pap smear of cervix    • Cancer ([address]) 12/2018    oral   • Hearing loss     down 50% in right ear   • Kidney stone     6 or 7 yrs ago   • Lumbar herniated disc    • Migraine    • Neck pain    • Prediabetes    • 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[address]) 3/8/2019   • Varicella    • Wears glasses        Past Surgical History:   Procedure Laterality Date   • HYSTERECTOMY  2007 • OOPHORECTOMY Bilateral 2007   • WI CERVICAL LYMPHADEC MODIFIED RADICAL NECK DSJ Bilateral 3/8/2019    Procedure: SUPRAOMOHYOID NECK DISSECTION;  Surgeon: Destiny Dickerson DO;  [facility];  Service: ENT   • WI EXCISION LESION TONGUE W/O CLOSURE N/A 1/9/2019    Procedure: Partial glossectomy with FROZEN SECTION;  Surgeon: Destiny Dickerson DO;  [facility];  Service: ENT   • WI EXCISION LESION TONGUE W/O CLOSURE Right 3/8/2019    Procedure: LATERAL TONGUE SURGICAL SITE RE-EXCISION WITH FROZEN SECTION ;  Surgeon: Destiny Dickerson DO;  [facility];  Service: ENT   • TONSILLECTOMY     • TUBAL LIGATION     • WISDOM TOOTH EXTRACTION         Current Outpatient Medications   Medication Sig Dispense Refill   • dicyclomine (BENTYL) 20 mg tablet Take 1 tablet (20 mg total) by mouth every 6 (six) hours as needed (abdominal cramping) for up to 10 days 40 tablet 0   • pantoprazole (PROTONIX) 40 mg tablet TAKE 1 TABLET BY MOUTH DAILY BEFORE BREAKFAST 30 tablet 1   • PREBIOTIC PRODUCT PO Take by mouth (Patient not taking: Reported on 6/30/2023)     • Probiotic Product (PROBIOTIC DAILY PO) Take by mouth       No current facility-administered medications for this visit. Allergies   Allergen Reactions   • Albuterol      tachycardia   • Bee Venom Anaphylaxis   • Penicillins Anaphylaxis     As a toddler       Review of Systems   Constitutional: Negative for appetite change, fatigue and fever. HENT: Negative. Negative for hearing loss, tinnitus, trouble swallowing and voice change. Eyes: Negative. Negative for photophobia, pain and visual disturbance. Respiratory: Negative. Negative for shortness of breath. Cardiovascular: Negative. Negative for palpitations. Gastrointestinal: Negative. Negative for nausea and vomiting. Endocrine: Negative. Negative for cold intolerance. Genitourinary: Negative. Negative for dysuria, frequency and urgency.    Musculoskeletal: Negative for back pain, gait problem, myalgias and neck pain. Skin: Negative. Negative for rash. Allergic/Immunologic: Negative. Neurological: Negative. Negative for dizziness, tremors, seizures, syncope, facial asymmetry, speech difficulty, weakness, light-headedness, numbness and headaches. Hematological: Negative. Does not bruise/bleed easily. Psychiatric/Behavioral: Negative. Negative for confusion, hallucinations and sleep disturbance. Video Exam    There were no vitals filed for this visit.     Physical Exam     Visit Time  Total Visit Duration: ***

## 2023-07-28 NOTE — PROGRESS NOTES
Virtual Regular Visit    Verification of patient location:    Patient is located at Home in the following state in which I hold an active license [place]      Assessment/Plan:    Problem List Items Addressed This Visit        Cardiovascular and Mediastinum    Migraine with aura and without status migrainosus, not intractable - Primary            Reason for visit is   Chief Complaint   Patient presents with   • Virtual Regular Visit   • Virtual Regular Visit        Encounter provider Tara Keys MD    Provider located at [address] [place] [id number]      Recent Visits  Date Type Provider Dept   07/28/23 Critical access hospital [facility] & [facility]. [address], MD [address]   07/24/23 Telephone Jeremias Rocha [facility] [address] [facility], [place], operated by [facility] recent visits within past 7 days and meeting all other requirements  Future Appointments  No visits were found meeting these conditions. Showing future appointments within next 150 days and meeting all other requirements       The patient was identified by name and date of birth. Jhoana Gould was informed that this is a telemedicine visit and that the visit is being conducted through the [employer]. She agrees to proceed. .  My office door was closed. No one else was in the room. She acknowledged consent and understanding of privacy and security of the video platform. The patient has agreed to participate and understands they can discontinue the visit at any time. Patient is aware this is a billable service.      Subjective  Assessment/Plan:   Jhoana Gould is a delightful 61 y.o. female with a past medical history of migraine with aura, right ear hearing loss, 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 in 2019, hemispheric carotid artery syndrome, arthralgia, chronic fatigue, kidney stone, GERD, dizziness, dry eyes, esophageal stricture, lumbar herniated disc, leukoplakia of tongue, hyperlipidemia, neck pain, planter fasciitis, prediabetes from history, fatty liver, hiatal hernia, urinary frequency, referred here for evaluation of headache. Migraine with aura   Feeling of swaying - resolved  Features of idiopathic intracranial hypertension on imaging without papilledema  She reports a long history of headaches dating back to age 9 that have waxed and waned over her lifetime. She reports she will have long periods of time where she does not have migraines, she can go months or up to a year without one, but typically has 12-15 a year. As of initial visit 2/7/2019, she reported new features to her visual aura - visual auras in the past where her vision would tunnel down and worked its way back up, but in September 2018 she had complete loss of vision for about 30 sec followed by slowly coming back in reversed tunnel vision. She also had vertical diplopia with this episode. Typical sensory aura with right hand and arm numbness with this episode. Language auras which started about 10 years ago, where her speech is not clear, words come out in the wrong order, which also happened with this most recent episode. No history of hemiplegic weakness, however does feel generally "weak" all over/fatigue type - not true weakness. over the last few years, she has had more of these auras without subsequent migraine, but she also admits that she often goes to bed and sleeps. -As of 2/7/2019: 12-15 a year, at that time recommended MRI brain, carotid ultrasound to further evaluate as well as headache preventative supplements  -As of 2/24/2023: She reports she is taking headache preventative supplements and returns over 3 years later with rare headaches maybe once a year although coincidentally has 1 today mildly. She is not interested in further medications for these. We reviewed her MRI brain.   She returns with episodic "swaying feeling" which we discussed sounds like possibly was triggered by a ear infection prior to August and we discussed could have been vestibular neuritis gradually improving causing vertiginous symptoms. No signs of BPPV on PT evaluation. We also discussed another possibility would be vertiginous migraine which would be treated the same as any migraine and could consider adding butterbur for prevention. We also discussed sounds like mal de debarquement, but she believes it started before her flight to [place]. - as of 7/28/2023: She reports doing very well as far as headaches and migraines go with occasional brief pain above left eye but otherwise limited complaints. 2 weeks ago she felt like she had a migraine visual aura coming on and then took 2 Excedrin and it never came as far as pain like it used to. She reports every time she goes to the eye doctor right eye gets worse in the left eye has been the same the last 3 pairs of glasses. We we discussed what started as what she thought was an ear infection in August, later put on a steroid and continued to have ear pain and then prednisone again and when she saw me in February she had no signs of BPPV on PT evaluation, at that time we discussed could be vertiginous migraine versus mal de debarquement, and as of 7/29/2023 we discussed recent research has suggested MRI findings of increased intracranial pressure and on retrospective review of her imaging I do see some subtle features which I discussed and explained to her could be contributing to her vision changes. She reports probably a week or 2 after she last saw me in February the vertiginous symptoms must have resolved as she did not even realize they were gone until a few months later.   We discussed could consider lumbar puncture at any time if she was interested in further investigation of pressure, acetazolamide/Diamox if ever interested in medical intervention to see if this decreases pressure, steroids episodically could help when more acutely worse and recommended considering sleep study or at least continuing to sleep on her side as I suspect she may have sleep apnea contributing to symptoms. Workup:  - she reports in 2013 she had a workup including MRI brain, carotid ultrasound, TTE that she thinks were unremarkable.     -MRI brain without contrast 9/23/2022: Minor white matter change within the frontal lobes suggestive of mild chronic microangiopathy.  No acute ischemia per radiology -as of 7/29/2023 we discussed she does not have obvious empty sella or partially empty sella but it might be slightly flattened and she does have pallor of left greater than right optic nerve with some tortuosity of the left nerve and some posterior globe flattening on the right eye as well as a tight cervical medullary junction  - Carotid ultrasound 9/8/2022: normal    Preventative:  - we discussed headache hygiene and lifestyle factors that may improve headaches  - we have discussed headache preventative supplements in the past, she is not currently interested in prescription preventative  - Currently on through other providers: Supplements  - Past/ failed/contraindicated: Amitriptyline would be contraindicated due to age  - future options: Acetazolamide/Diamox venlafaxine, gabapentin, CGRP med, botox    Rescue:  - recommend not taking over-the-counter or prescription pain medications more than 3 days per week to prevent medication overuse/rebound headache  -We discussed if ever needed acutely would recommend a steroid  - Currently on through other providers: Meclizine  - Past/ failed/contraindicated: OTC meds  - past/tolerated: steroids   - future options:  Triptan,  prochlorperazine, Toradol IM or p.o., could consider trial of 5 days of Depakote 500 mg nightly or dexamethasone 2 mg daily for prolonged migraine, ubrelvy, reyvow, nurtec      Patient instructions      Low sodium diet     If this became bothersome again, consider ENT follow up or I could see if could order VNG. Petra called sleep cycle - just the free part  -let me know if you would want to consider sleep study  Continue to not sleep on your back, consider zzoma pillow     Headache/migraine treatment:   Abortive medications (for immediate treatment of a headache): It is ok to take ibuprofen, acetaminophen or naproxen (Advil, Tylenol,  Aleve, Excedrin) if they help your headaches you should limit these to No more than 3 times a week to avoid medication overuse/rebound headaches. Over the counter preventive supplements for headaches/migraines (if you try, try for 3 months straight)  (to take every day to help prevent headaches - not to take at the time of headache): There are combo pills online of these - none of which regulated by FDA and double check dosing - take appropriate dose only once a day- *preventa migraine, migravent, mind ease, migrelief   [x] Magnesium 400mg daily (If any diarrhea or upset stomach, decrease dose  as tolerated)  [x] Riboflavin (Vitamin B2) 400mg daily - try online   (FYI B2 may make your urine bright/neon yellow)  AND/OR  [] Herbal medication: Petasites/Butterbur 150 mg daily - try online  (When choosing your Butterbur online or in the store, beware that there are some poor preps containing pyrrolizidine alkaloids (PAs) that can be harmful to the liver. Therefore, do not use butterbur products that are not labeled as PA-free.)      Lifestyle Recommendations:  [x] SLEEP - Maintain a regular sleep schedule: Adults need at least 7-8 hours of uninterrupted a night. Maintain good sleep hygiene:  Going to bed and waking up at consistent times, avoiding excessive daytime naps, avoiding caffeinated beverages in the evening, avoid excessive stimulation in the evening and generally using bed primarily for sleeping. One hour before bedtime would recommend turning lights down lower, decreasing your activity (may read quietly, listen to music at a low volume). When you get into bed, should eliminate all technology (no texting, emailing, playing with your phone, iPad or tablet in bed). [x] HYDRATION - Maintain good hydration. Drink  2L of fluid a day (4 typical small water bottles)  [x] DIET - Maintain good nutrition. In particular don't skip meals and try and eat healthy balanced meals regularly. [x] TRIGGERS - Look for other triggers and avoid them: Limit caffeine to 1-2 cups a day or less. Avoid dietary triggers that you have noticed bring on your headaches (this could include aged cheese, peanuts, MSG, aspartame and nitrates). [x] EXERCISE - physical exercise as we all know is good for you in many ways, and not only is good for your heart, but also is beneficial for your mental health, cognitive health and  chronic pain/headaches. I would encourage at the least 5 days of physical exercise weekly for at least 30 minutes. Education and Follow-up  [x] Please call with any questions or concerns. [x] follow up 3 months, sooner if needed       CC: We had the pleasure of evaluating David Adams in neurological consultation today. David Adams is a 62 y.o.   right handed female who presents today for evaluation of headaches.      History of Present Illness:   Interval history as of 7/29/2023  - no significant new or concerning neurologic symptoms since last visit   - - sleeps 8 hours, feels rested during the day, mostly right side, not back - cant not     - GI switched priobiotic and gas X every meal - helping   - every time she goes to the eye doctor right eye gets worse, left eye same last three pair     Headaches and migraines   Two weeks ago felt an aura l patrick a migraine coming on and then took two exedrin and it never came as far as apin   Some pain brief pain above left eye     Interval history as of 2/24/2023  - she returns 3 years later, with swaying   - imaging of the spine with chiropractor, trying to upload discs  - 8/2022 - in ED for 7 hours for chest pain, improved with mylanta with lidocaine in it, all the organs looked fine  - sleeps 8 hours, feels rested during the day     Headaches and migraines   "sinus headaches" Rarely, one today, once a year    She comes in today with swaying feeling  Noticed it the most in August, started before she flew to [facility] and while there worsened and had bad abdominal pain and it was an ear infection the week before and they gave her antibiotics that didn't clear it up, and then went back to PCP who gave her another round of prednisone and then came back after laying on couch for 8 days with abdominal pain and added probiotic and better    Waxes and wanes by the day  Sometimes not at all for a week  Sometimes bad day where it is every hour  Hard to describe  Almost like you are standing on a dock to get on a boat, not spinning and not actually swaying but feels like it side to side slightly  Lasts 5 seconds, up to a minute and goes away on its own  Will get but doesn't think related, right forehead brief jabs, once every month or two   No known association  - thought at first   Never while laying down   PT did a bunch of tests, back then was having aural fullness  Not seen ENT since 2019  Gotten so used to it, doesn't notice it       "Pt is 57 y/o female presenting to physical therapy with dizziness. Pt reports she had ear infections in April where she was then experiencing symptoms of her rocking back and forth. Pt reports the ear infections cleared up, however, her symptoms worsened over the summer in intensity. Pt reports she would be able to walk normal, however, in her head she felt she was moving all over the place. Pt reports the dizziness only last a few seconds to half a minute as well as flash headaches. Pt reports going to see her primary care physician on 9/5 who referred her for therapy.  Pt reports her symptoms improved on Saturday and she has not suffered from any dizziness in the past week."    History as of initial visit 2/7/19:  History of migraines, tongue squamous cell cancer on right ventral side of tongue and floor of mouth    What is your current pain level - 0    Headaches started at what age? 9years old  - changed over time  How often do the headaches occur? Has not had one since [place]  Will have long periods of time where she has migraines - Can go months to a year without one   12-15 a year     - in the last two years some new features:   -In sept was sitting talking to a patient and both eye completely black, could see nothing for max 30 seconds, and then slowly came back in reverse tunnel vision, then had vertical double vision, right hand and arm numbness (like usual) and felt spacey, and then he said she was talking weird and he could not understand her because it sounds like she is talking in a different language (words come out in wrong order), and she went white - took 4 exedrin, did not get a headache, lasted maybe 30 minutes  - another time could not find the 5 on the phone - could not dial a number she was looking at - lasted maybe an hour  - overall feeling weak  - after these episodes go to bed, wakes up without symptoms except for fog for a it after   - the last couple over the last couple of years does not get head pain     (before would tunnel down and work its way up, never complete loss before, in the last 10 years the language aura is new, always had the sensory aura)    - thinks she had a TTE, Carotid US and MRI back in 2013     What time of the day do the headaches start? no particular time of day  How long do the headaches last? 1-2 hours aura, head pain lasts a few hours, never more than 12 hours  Are you ever headache free? Yes    Aura? with aura - see above - visual, language, sensory auras    Describe your usual headache pain quality and location?    Pain varies, starts mid frontal or left frontal, sometimes creep up from back, rarely on the right   What is the intensity of pain? 10  Associated symptoms:   [x] Nausea       [] Vomiting        [] Diarrhea  [] Insomnia     [] Stiff or sore neck   [x] Problems with concentration  [x] Photophobia     [x]Phonophobia      [x] Osmophobia  [x] Blurred vision   [x] Prefer quiet, dark room  [x] Light-headed  [x] Tinnitus   [x] Hands or feet tingle or feel numb/paresthesias  - aura     [] Red ear      [] Ptosis      [] Facial droop  [] Lacrimation  [] Nasal congestion/rhinorrhea   [] Flushing of face  [] Change in pupil size      Headache triggers:  None noticed, except for pizza/ or spagetti two days in a row      Have you seen someone else for headaches or pain? Yes, last saw neurology Dr. Avani Liao   Have you had trigger point injection performed and how often? No  Have you had Botox injection performed and how often? No   Have you had epidural injections or transforaminal injections performed? No  Have you used CBD or THC for your headaches and how often? No  Are you current pregnant or planning on getting pregnant? No  Have you ever had any Brain imaging? Yes     What medications do you take or have you taken for your headaches?    ABORTIVE:    exedrin    PREVENTIVE:   No     Magnesium at 1000 mg and then 500 mg caused major diarrhea     Alternative therapies used in the past for headaches? chiropractic care once a month for neck disc issue, massage every 2 month     Neck pain?: no    LIFESTYLE  Sleep - averages 8 hours    Physical activity: not recently   Water: 4 bottles       Mood: no history of anxiety or depression       The following portions of the patient's history were reviewed and updated as appropriate: allergies, current medications, past family history, past medical history, past social history, past surgical history and problem list.          Past Medical History:   Diagnosis Date   • Abnormal Pap smear of cervix    • Cancer ([address]) 12/2018    oral   • Hearing loss     down 50% in right ear   • Kidney stone     6 or 7 yrs ago   • Lumbar herniated disc    • Migraine    • Neck pain    • Prediabetes    • 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[address]) 3/8/2019   • Varicella    • Wears glasses        Past Surgical History:   Procedure Laterality Date   • HYSTERECTOMY  2007   • OOPHORECTOMY Bilateral 2007   • IN CERVICAL LYMPHADEC MODIFIED RADICAL NECK DSJ Bilateral 3/8/2019    Procedure: SUPRAOMOHYOID NECK DISSECTION;  Surgeon: Sulma Orta DO;  Location: [facility];  Service: ENT   • IN EXCISION LESION TONGUE W/O CLOSURE N/A 1/9/2019    Procedure: Partial glossectomy with FROZEN SECTION;  Surgeon: Sulma Orta DO;  Location: [facility];  Service: ENT   • IN EXCISION LESION TONGUE W/O CLOSURE Right 3/8/2019    Procedure: LATERAL TONGUE SURGICAL SITE RE-EXCISION WITH FROZEN SECTION ;  Surgeon: Sulma Orta DO;  Location: [facility];  Service: ENT   • TONSILLECTOMY     • TUBAL LIGATION     • WISDOM TOOTH EXTRACTION         Current Outpatient Medications   Medication Sig Dispense Refill   • dicyclomine (BENTYL) 20 mg tablet Take 1 tablet (20 mg total) by mouth every 6 (six) hours as needed (abdominal cramping) for up to 10 days 40 tablet 0   • pantoprazole (PROTONIX) 40 mg tablet TAKE 1 TABLET BY MOUTH DAILY BEFORE BREAKFAST 30 tablet 1   • PREBIOTIC PRODUCT PO Take by mouth     • Probiotic Product (PROBIOTIC DAILY PO) Take by mouth       No current facility-administered medications for this visit. Allergies   Allergen Reactions   • Albuterol      tachycardia   • Bee Venom Anaphylaxis   • Penicillins Anaphylaxis     As a toddler         Objective:     Exam limited by Video  Physical Exam:                                                                 Vitals:            Constitutional:    There were no vitals taken for this visit.   BP Readings from Last 3 Encounters:   06/30/23 130/86   06/12/23 159/79   05/30/23 132/84     Pulse Readings from Last 3 Encounters:   06/30/23 94   06/12/23 71   05/30/23 71         Well developed, well nourished, No dysmorphic features. HEENT:  Normocephalic atraumatic. See neuro exam   Psychiatric:  Normal behavior and appropriate affect        Neurological Examination:     Mental status/cognitive function:   Recent and remote memory appear intact. Attention span and concentration as well as fund of knowledge appear appropriate for age. Normal language and spontaneous speech. Cranial Nerves:  VII-facial expression symmetric  Motor Exam: symmetric bulk throughout. no atrophy, fasciculations or abnormal movements noted. Coordination:  no apparent dysmetria, ataxia or tremor noted        Pertinent lab results:   See EMR for recent labs   11/24/2018:  CMP and CBC unremarkable  TSH 2.17     11/24/2018:       Imaging: I have personally reviewed imaging and radiology read   -MRI brain without contrast 9/23/2022: Minor white matter change within the frontal lobes suggestive of mild chronic microangiopathy.  No acute ischemia per radiology -as of 7/29/2023 we discussed she does not have obvious empty sella or partially empty sella but it might be slightly flattened and she does have pallor of left greater than right optic nerve with some tortuosity of the left nerve and some posterior globe flattening on the right eye as well as a tight cervical medullary junction  - Carotid ultrasound 9/8/2022: normal     She reports she had a complete cardiac work-up recently as of 2/24/2023 that was unremarkable     - MRI Brain 3/11/13 - told was normal   Review of Systems:   ROS obtained by medical assistant Personally reviewed and updated if indicated. I recommended PCP follow up for non neurologic problems. Review of Systems   Constitutional: Negative for appetite change, fatigue and fever. HENT: Negative. Negative for hearing loss, tinnitus, trouble swallowing and voice change. Eyes: Negative. Negative for photophobia, pain and visual disturbance. Respiratory: Negative.   Negative for shortness of breath. Cardiovascular: Negative. Negative for palpitations. Gastrointestinal: Negative. Negative for nausea and vomiting. Endocrine: Negative. Negative for cold intolerance. Genitourinary: Negative. Negative for dysuria, frequency and urgency. Musculoskeletal: Negative for back pain, gait problem, myalgias and neck pain. Skin: Negative. Negative for rash. Allergic/Immunologic: Negative. Neurological: Negative. Negative for dizziness, tremors, seizures, syncope, facial asymmetry, speech difficulty, weakness, light-headedness, numbness and headaches. Hematological: Negative. Does not bruise/bleed easily. Psychiatric/Behavioral: Negative. Negative for confusion, hallucinations and sleep disturbance.           I have spent 20 minutes with Patient today in which greater than 50% of this time was spent in counseling/coordination of care regarding Diagnostic results, Prognosis, Risks and benefits of tx options, Instructions for management, Importance of tx compliance, Risk factor reductions, Impressions, Counseling / Coordination of care, Documenting in the medical record, Reviewing / ordering tests, medicine, procedures   and Obtaining or reviewing history  . I also spent 12 minutes non face to face for this patient the same day.            Visit Time  Total Visit Duration: 32

## 2023-09-05 ENCOUNTER — TELEPHONE (OUTPATIENT)
Age: 63
End: 2023-09-05

## 2023-09-05 DIAGNOSIS — K21.9 GASTROESOPHAGEAL REFLUX DISEASE WITHOUT ESOPHAGITIS: ICD-10-CM

## 2023-09-05 DIAGNOSIS — K21.00 GASTROESOPHAGEAL REFLUX DISEASE WITH ESOPHAGITIS, UNSPECIFIED WHETHER HEMORRHAGE: ICD-10-CM

## 2023-09-05 DIAGNOSIS — K22.2 ESOPHAGEAL STRICTURE: ICD-10-CM

## 2023-09-05 RX ORDER — PANTOPRAZOLE SODIUM 40 MG/1
40 TABLET, DELAYED RELEASE ORAL
Qty: 30 TABLET | Refills: 1 | Status: SHIPPED | OUTPATIENT
Start: 2023-09-05

## 2023-09-05 NOTE — TELEPHONE ENCOUNTER
Reschedule:    Scheduled date of colonoscopy (as of today): 11-6-2023   Physician performing colonoscopy: Dr. Almas Ley  Location of colonoscopy: [place]  Bowel prep reviewed with patient: [facility] CL[facility]   Instructions reviewed with patient by: 6-

## 2023-10-19 ENCOUNTER — TELEPHONE (OUTPATIENT)
Dept: NEUROLOGY | Facility: CLINIC | Age: 63
End: 2023-10-19

## 2023-10-19 NOTE — TELEPHONE ENCOUNTER
Called and spoke to patient to confirm their upcoming appointment with Dr. Vaishnavi Puga. Informed patient about calling 15 minutes prior to their appointment to get checked in and going over chart.

## 2023-10-20 NOTE — TELEPHONE ENCOUNTER
Pt can not make appt on 10/26.  R/s to first available 2/16/24 @ 2:00 Virtually with Dr. Sinai Marques

## 2023-11-01 DIAGNOSIS — K21.9 GASTROESOPHAGEAL REFLUX DISEASE WITHOUT ESOPHAGITIS: ICD-10-CM

## 2023-11-01 DIAGNOSIS — K21.00 GASTROESOPHAGEAL REFLUX DISEASE WITH ESOPHAGITIS, UNSPECIFIED WHETHER HEMORRHAGE: ICD-10-CM

## 2023-11-01 DIAGNOSIS — K22.2 ESOPHAGEAL STRICTURE: ICD-10-CM

## 2023-11-01 RX ORDER — PANTOPRAZOLE SODIUM 40 MG/1
40 TABLET, DELAYED RELEASE ORAL
Qty: 30 TABLET | Refills: 1 | Status: SHIPPED | OUTPATIENT
Start: 2023-11-01

## 2023-12-25 DIAGNOSIS — K22.2 ESOPHAGEAL STRICTURE: ICD-10-CM

## 2023-12-25 DIAGNOSIS — K21.00 GASTROESOPHAGEAL REFLUX DISEASE WITH ESOPHAGITIS, UNSPECIFIED WHETHER HEMORRHAGE: ICD-10-CM

## 2023-12-25 DIAGNOSIS — K21.9 GASTROESOPHAGEAL REFLUX DISEASE WITHOUT ESOPHAGITIS: ICD-10-CM

## 2023-12-26 RX ORDER — PANTOPRAZOLE SODIUM 40 MG/1
40 TABLET, DELAYED RELEASE ORAL
Qty: 30 TABLET | Refills: 1 | Status: SHIPPED | OUTPATIENT
Start: 2023-12-26

## 2024-02-09 ENCOUNTER — TELEPHONE (OUTPATIENT)
Dept: NEUROLOGY | Facility: CLINIC | Age: 64
End: 2024-02-09

## 2024-02-09 NOTE — TELEPHONE ENCOUNTER
Called patient and left voicemail to confirm their upcoming appointment with Dr. William. Informed patient about arriving in the [place] location 15 minutes prior to appointment to get checked in and go over chart.

## 2024-02-16 ENCOUNTER — TELEMEDICINE (OUTPATIENT)
Dept: NEUROLOGY | Facility: CLINIC | Age: 64
End: 2024-02-16

## 2024-02-16 ENCOUNTER — TELEPHONE (OUTPATIENT)
Dept: NEUROLOGY | Facility: CLINIC | Age: 64
End: 2024-02-16

## 2024-02-16 DIAGNOSIS — G43.109 MIGRAINE WITH AURA AND WITHOUT STATUS MIGRAINOSUS, NOT INTRACTABLE: Primary | ICD-10-CM

## 2024-02-16 PROCEDURE — 99213 OFFICE O/P EST LOW 20 MIN: CPT | Performed by: PSYCHIATRY & NEUROLOGY

## 2024-02-16 NOTE — PATIENT INSTRUCTIONS
Low sodium diet     If this became bothersome again, consider ENT follow up or I could see if could order VNG.     Petra called sleep cycle - just the free part  -let me know if you would want to consider sleep study  Continue to not sleep on your back, consider zzoma pillow     Headache/migraine treatment:   Abortive medications (for immediate treatment of a headache):   It is ok to take ibuprofen, acetaminophen or naproxen (Advil, Tylenol,  Aleve, Excedrin) if they help your headaches you should limit these to No more than 3 times a week to avoid medication overuse/rebound headaches.     See if Excedrin tension works just as well as Excedrin Migraine to avoid excess aspirin in a day      Over the counter preventive supplements for headaches/migraines (if you try, try for 3 months straight)  (to take every day to help prevent headaches - not to take at the time of headache):  There are combo pills online of these - none of which regulated by FDA and double check dosing - take appropriate dose only once a day- *preventa migraine, migravent, mind ease, migrelief   [x] Magnesium 400mg daily (If any diarrhea or upset stomach, decrease dose  as tolerated)  [x] Riboflavin (Vitamin B2) 400mg daily - try online   (FYI B2 may make your urine bright/neon yellow)  AND/OR  [] Herbal medication: Petasites/Butterbur 150 mg daily - try online  (When choosing your Butterbur online or in the store, beware that there are some poor preps containing pyrrolizidine alkaloids (PAs) that can be harmful to the liver. Therefore, do not use butterbur products that are not labeled as PA-free.)    Lifestyle Recommendations:  [x] SLEEP - Maintain a regular sleep schedule: Adults need at least 7-8 hours of uninterrupted a night. Maintain good sleep hygiene:  Going to bed and waking up at consistent times, avoiding excessive daytime naps, avoiding caffeinated beverages in the evening, avoid excessive stimulation in the evening and generally using  bed primarily for sleeping.  One hour before bedtime would recommend turning lights down lower, decreasing your activity (may read quietly, listen to music at a low volume). When you get into bed, should eliminate all technology (no texting, emailing, playing with your phone, iPad or tablet in bed).  [x] HYDRATION - Maintain good hydration.  Drink  2L of fluid a day (4 typical small water bottles)  [x] DIET - Maintain good nutrition. In particular don't skip meals and try and eat healthy balanced meals regularly.  [x] TRIGGERS - Look for other triggers and avoid them: Limit caffeine to 1-2 cups a day or less. Avoid dietary triggers that you have noticed bring on your headaches (this could include aged cheese, peanuts, MSG, aspartame and nitrates).  [x] EXERCISE - physical exercise as we all know is good for you in many ways, and not only is good for your heart, but also is beneficial for your mental health, cognitive health and  chronic pain/headaches. I would encourage at the least 5 days of physical exercise weekly for at least 30 minutes.     Education and Follow-up  [x] Please call with any questions or concerns.   [x] follow up 6 months and if not needed 1 year, sooner if needed

## 2024-02-16 NOTE — PROGRESS NOTES
Virtual Regular Visit    Verification of patient location:    Patient is located at Home in the following state in which I hold an active license [place]      Assessment/Plan:    Problem List Items Addressed This Visit       Migraine with aura and without status migrainosus, not intractable - Primary            Reason for visit is No chief complaint on file.     Encounter provider Iqra William MD    [facility] [address] [place] [address] [phone]      Recent Visits  Date Type Provider Dept   02/09/24 Telephone Susan Finley  [facility]   Showing recent visits within past 7 days and meeting all other requirements  Today's Visits  Date Type Provider Dept   02/16/24 Telephone Iqra William MD  [facility]   02/16/24 [facility]   Showing today's visits and meeting all other requirements  Future Appointments  No visits were found meeting these conditions.  Showing future appointments within next 150 days and meeting all other requirements       The patient was identified by name and date of birth. Phoebe Kamara was informed that this is a telemedicine visit and that the visit is being conducted through the Epic Embedded platform. She agrees to proceed..  My office door was closed. No one else was in the room, but resident neurologist went in room first Jorden Casiano DO.  She acknowledged consent and understanding of privacy and security of the video platform. The patient has agreed to participate and understands they can discontinue the visit at any time.    Patient is aware this is a billable service.     Subjective    Assessment/Plan:   Phoebe Kamara is a delightful 63 y.o. female with a past medical history of migraine with aura, right ear hearing loss, 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 in 2019,  "hemispheric carotid artery syndrome, arthralgia, chronic fatigue, kidney stone, GERD, dizziness, dry eyes, esophageal stricture, lumbar herniated disc, leukoplakia of tongue, hyperlipidemia, neck pain, planter fasciitis, prediabetes from history, fatty liver, hiatal hernia, urinary frequency, referred here for evaluation of headache.    Migraine with aura   Feeling of swaying - resolved  She reports a long history of headaches dating back to age 7 that have waxed and waned over her lifetime. She reports she will have long periods of time where she does not have migraines, she can go months or up to a year without one, but typically has 12-15 a year.  As of initial visit 2/7/2019, she reported new features to her visual aura - visual auras in the past where her vision would tunnel down and worked its way back up, but in September 2018 she had complete loss of vision for about 30 sec followed by slowly coming back in reversed tunnel vision.  She also had vertical diplopia with this episode. Typical sensory aura with right hand and arm numbness with this episode. Language auras which started about 10 years ago, where her speech is not clear, words come out in the wrong order, which also happened with this most recent episode. No history of hemiplegic weakness, however does feel generally \"weak\" all over/fatigue type - not true weakness. over the last few years, she has had more of these auras without subsequent migraine, but she also admits that she often goes to bed and sleeps.   -As of 2/7/2019: 12-15 a year, at that time recommended MRI brain, carotid ultrasound to further evaluate as well as headache preventative supplements  -As of 2/24/2023: She reports she is taking headache preventative supplements and returns over 3 years later with rare headaches maybe once a year although coincidentally has 1 today mildly.  She is not interested in further medications for these.  We reviewed her MRI brain.  She returns with " "episodic \"swaying feeling\" which we discussed sounds like possibly was triggered by a ear infection prior to August and we discussed could have been vestibular neuritis gradually improving causing vertiginous symptoms. No signs of BPPV on PT evaluation.  We also discussed another possibility would be vertiginous migraine which would be treated the same as any migraine and could consider adding butterbur for prevention.  We also discussed sounds like mal de debarquement, but she believes it started before her flight to [place].   - as of 7/28/2023: She reports doing very well as far as headaches and migraines go with occasional brief pain above left eye but otherwise limited complaints.  2 weeks ago she felt like she had a migraine visual aura coming on and then took 2 Excedrin and it never came as far as pain like it used to.  She reports every time she goes to the eye doctor right eye gets worse in the left eye has been the same the last 3 pairs of glasses.  We we discussed what started as what she thought was an ear infection in August, later put on a steroid and continued to have ear pain and then prednisone again and when she saw me in February she had no signs of BPPV on PT evaluation, at that time we discussed could be vertiginous migraine versus mal de debarquement, and as of 7/29/2023 we discussed recent research has suggested MRI findings of increased intracranial pressure and on retrospective review of her imaging I do see some subtle features which I discussed and explained to her could be contributing to her vision changes.  She reports probably a week or 2 after she last saw me in February the vertiginous symptoms must have resolved as she did not even realize they were gone until a few months later.  We discussed could consider lumbar puncture at any time if she was interested in further investigation of pressure, acetazolamide/Diamox if ever interested in medical intervention to see if this decreases " pressure, steroids episodically could help when more acutely worse and recommended considering sleep study or at least continuing to sleep on her side as I suspect she may have sleep apnea contributing to symptoms.  - as of 2/16/2024: She reports she is doing well and cannot remember when her last migraine was and if she were to feel 1 coming up would take 2 Excedrin and it typically would not come.  3 days a week will have a low-grade pressure headache that she may wake up with or can come on midday related to barometric pressure/weather changes.  Twice a month there may be a brief neuralgia type pain for seconds that self resolves.  If ever indicated and agreeable would still recommend sleep study.    Workup:  - she reports in 2013 she had a workup including MRI brain, carotid ultrasound, TTE that she thinks were unremarkable.    -MRI brain without contrast 9/23/2022: Minor white matter change within the frontal lobes suggestive of mild chronic microangiopathy.  No acute ischemia per radiology -as of 7/29/2023 we discussed she does not have obvious empty sella or partially empty sella but it might be slightly flattened, prominent optic nerve sheath with some tortuosity bilaterally   - Carotid ultrasound 9/8/2022: normal    Preventative:  - we discussed headache hygiene and lifestyle factors that may improve headaches  - we have discussed headache preventative supplements in the past, she is not currently interested in prescription preventative  - Currently on through other providers: Supplements, aspirin 81 mg daily  - Past/ failed/contraindicated: Amitriptyline would be contraindicated due to age  - future options: Acetazolamide/Diamox venlafaxine, gabapentin, CGRP med, botox    Rescue:  - recommend not taking over-the-counter or prescription pain medications more than 3 days per week to prevent medication overuse/rebound headache  -We discussed if ever needed acutely would recommend a steroid  - Currently on  through other providers: None pertinent  - Past/ failed/contraindicated: OTC meds  - past/tolerated: steroids, meclizine  - future options:  Triptan,  prochlorperazine, Toradol IM or p.o., could consider trial of 5 days of Depakote 500 mg nightly or dexamethasone 2 mg daily for prolonged migraine, ubrelvy, reyvow, nurtec    Patient instructions     Low sodium diet     If this became bothersome again, consider ENT follow up or I could see if could order VNG.     Petra called sleep cycle - just the free part  -let me know if you would want to consider sleep study  Continue to not sleep on your back, consider zzoma pillow     Headache/migraine treatment:   Abortive medications (for immediate treatment of a headache):   It is ok to take ibuprofen, acetaminophen or naproxen (Advil, Tylenol,  Aleve, Excedrin) if they help your headaches you should limit these to No more than 3 times a week to avoid medication overuse/rebound headaches.     See if Excedrin tension works just as well as Excedrin Migraine to avoid excess aspirin in a day      Over the counter preventive supplements for headaches/migraines (if you try, try for 3 months straight)  (to take every day to help prevent headaches - not to take at the time of headache):  There are combo pills online of these - none of which regulated by FDA and double check dosing - take appropriate dose only once a day- *preventa migraine, migravent, mind ease, migrelief   [x] Magnesium 400mg daily (If any diarrhea or upset stomach, decrease dose  as tolerated)  [x] Riboflavin (Vitamin B2) 400mg daily - try online   (FYI B2 may make your urine bright/neon yellow)  AND/OR  [] Herbal medication: Petasites/Butterbur 150 mg daily - try online  (When choosing your Butterbur online or in the store, beware that there are some poor preps containing pyrrolizidine alkaloids (PAs) that can be harmful to the liver. Therefore, do not use butterbur products that are not labeled as  PA-free.)    Lifestyle Recommendations:  [x] SLEEP - Maintain a regular sleep schedule: Adults need at least 7-8 hours of uninterrupted a night. Maintain good sleep hygiene:  Going to bed and waking up at consistent times, avoiding excessive daytime naps, avoiding caffeinated beverages in the evening, avoid excessive stimulation in the evening and generally using bed primarily for sleeping.  One hour before bedtime would recommend turning lights down lower, decreasing your activity (may read quietly, listen to music at a low volume). When you get into bed, should eliminate all technology (no texting, emailing, playing with your phone, iPad or tablet in bed).  [x] HYDRATION - Maintain good hydration.  Drink  2L of fluid a day (4 typical small water bottles)  [x] DIET - Maintain good nutrition. In particular don't skip meals and try and eat healthy balanced meals regularly.  [x] TRIGGERS - Look for other triggers and avoid them: Limit caffeine to 1-2 cups a day or less. Avoid dietary triggers that you have noticed bring on your headaches (this could include aged cheese, peanuts, MSG, aspartame and nitrates).  [x] EXERCISE - physical exercise as we all know is good for you in many ways, and not only is good for your heart, but also is beneficial for your mental health, cognitive health and  chronic pain/headaches. I would encourage at the least 5 days of physical exercise weekly for at least 30 minutes.     Education and Follow-up  [x] Please call with any questions or concerns.   [x] follow up 6 months and if not needed 1 year, sooner if needed     CC:   We had the pleasure of evaluating Phoebe Kamara in neurological consultation today. Phoebe Kamara is a 58 y.o.   right handed female who presents today for evaluation of headaches.     History of Present Illness:   Interval history as of 2/16/2024  - no significant new or concerning neurologic symptoms since last visit    - sleep - right side, 8 hours   - eye doctor  "next month     Headaches and migraines   The patient reports that \"I have actually been pretty well\".      Since last visit, she cannot remember when her last migraine headache was  -What she has found works well for her if she feels 1 coming on she is takes 2 Excedrin does not have to deal with it     Three days a week low grade headache that is described to be pressure-like, (may wake up with it or mid day, very weather related/barometric pressure)    Twice a month there will be associated pain and then they will be shooting pain lasting for a few seconds and then will resolve spontaneously (brief lightening strike)    The patient reported that \"I had a really weird headache\" about month ago: the patient reported that there was a sharp shooting pain on her right forehead, this lasted for about 10 seconds and then disappeared completely, and the patient took an Excedrin (2 tabs). There was only one of these episodes.   -This 1 it felt like as if there was liquid and there it would be trickling so it was this weird trickling    Preventative:   - through other providers: asa 81 mg daily   - mag, B2, been off of butterbur for a year due to low supply    Abortive:   - 2 exedrin works ahead of time    Denies bothersome side effects        Interval history as of 7/29/2023  - no significant new or concerning neurologic symptoms since last visit   - - sleeps 8 hours, feels rested during the day, mostly right side, not back - cant not     - GI switched priobiotic and gas X every meal - helping   - every time she goes to the eye doctor right eye gets worse, left eye same last three pair     Headaches and migraines   Two weeks ago felt an aura l patrick a migraine coming on and then took two exedrin and it never came as far as apin   Some pain brief pain above left eye     Interval history as of 2/24/2023  - she returns 3 years later, with swaying   - imaging of the spine with chiropractor, trying to upload discs  - 8/2022 - in " "ED for 7 hours for chest pain, improved with mylanta with lidocaine in it, all the organs looked fine  - sleeps 8 hours, feels rested during the day     Headaches and migraines   \"sinus headaches\" Rarely, one today, once a year    She comes in today with swaying feeling  Noticed it the most in August, started before she flew to [place] and while there worsened and had bad abdominal pain and it was an ear infection the week before and they gave her antibiotics that didn't clear it up, and then went back to PCP who gave her another round of prednisone and then came back after laying on couch for 8 days with abdominal pain and added probiotic and better    Waxes and wanes by the day  Sometimes not at all for a week  Sometimes bad day where it is every hour  Hard to describe  Almost like you are standing on a dock to get on a boat, not spinning and not actually swaying but feels like it side to side slightly  Lasts 5 seconds, up to a minute and goes away on its own  Will get but doesn't think related, right forehead brief jabs, once every month or two   No known association  - thought at first   Never while laying down   PT did a bunch of tests, back then was having aural fullness  Not seen ENT since 2019  Gotten so used to it, doesn't notice it     \"Pt is 61 y/o female presenting to physical therapy with dizziness. Pt reports she had ear infections in April where she was then experiencing symptoms of her rocking back and forth. Pt reports the ear infections cleared up, however, her symptoms worsened over the summer in intensity. Pt reports she would be able to walk normal, however, in her head she felt she was moving all over the place. Pt reports the dizziness only last a few seconds to half a minute as well as flash headaches. Pt reports going to see her primary care physician on 9/5 who referred her for therapy. Pt reports her symptoms improved on Saturday and she has not suffered from any dizziness in the past " "week.\"    History as of initial visit 2/7/19:  History of migraines, tongue squamous cell cancer on right ventral side of tongue and floor of mouth    What is your current pain level - 0    Headaches started at what age? 7 years old  - changed over time  How often do the headaches occur?   Has not had one since Septrosalva  Will have long periods of time where she has migraines - Can go months to a year without one   12-15 a year     - in the last two years some new features:   -In sept was sitting talking to a patient and both eye completely black, could see nothing for max 30 seconds, and then slowly came back in reverse tunnel vision, then had vertical double vision, right hand and arm numbness (like usual) and felt spacey, and then he said she was talking weird and he could not understand her because it sounds like she is talking in a different language (words come out in wrong order), and she went white - took 4 exedrin, did not get a headache, lasted maybe 30 minutes  - another time could not find the 5 on the phone - could not dial a number she was looking at - lasted maybe an hour  - overall feeling weak  - after these episodes go to bed, wakes up without symptoms except for fog for a it after   - the last couple over the last couple of years does not get head pain     (before would tunnel down and work its way up, never complete loss before, in the last 10 years the language aura is new, always had the sensory aura)    - thinks she had a TTE, Carotid US and MRI back in 2013     What time of the day do the headaches start? no particular time of day  How long do the headaches last? 1-2 hours aura, head pain lasts a few hours, never more than 12 hours  Are you ever headache free? Yes    Aura? with aura - see above - visual, language, sensory auras    Describe your usual headache pain quality and location?   Pain varies, starts mid frontal or left frontal, sometimes creep up from back, rarely on the right   What " is the intensity of pain? 10  Associated symptoms:   [x] Nausea       [] Vomiting        [] Diarrhea  [] Insomnia     [] Stiff or sore neck   [x] Problems with concentration  [x] Photophobia     [x]Phonophobia      [x] Osmophobia  [x] Blurred vision   [x] Prefer quiet, dark room  [x] Light-headed  [x] Tinnitus   [x] Hands or feet tingle or feel numb/paresthesias  - aura     [] Red ear      [] Ptosis      [] Facial droop  [] Lacrimation  [] Nasal congestion/rhinorrhea   [] Flushing of face  [] Change in pupil size    Headache triggers:  None noticed, except for pizza/ or spagetti two days in a row      Have you seen someone else for headaches or pain? Yes, last saw neurology Dr. Guajardo   Have you had trigger point injection performed and how often? No  Have you had Botox injection performed and how often? No   Have you had epidural injections or transforaminal injections performed? No  Have you used CBD or THC for your headaches and how often? No  Are you current pregnant or planning on getting pregnant? No  Have you ever had any Brain imaging? Yes     What medications do you take or have you taken for your headaches?   ABORTIVE:    exedrin    PREVENTIVE:   No     Magnesium at 1000 mg and then 500 mg caused major diarrhea     Alternative therapies used in the past for headaches? chiropractic care once a month for neck disc issue, massage every 2 month     Neck pain?: no    LIFESTYLE  Sleep - averages 8 hours    Physical activity: not recently   Water: 4 bottles     Mood: no history of anxiety or depression     The following portions of the patient's history were reviewed and updated as appropriate: allergies, current medications, past family history, past medical history, past social history, past surgical history and problem list.    Past Medical History:   Diagnosis Date    Abnormal Pap smear of cervix     Cancer (HCC) 12/2018    oral    Hearing loss     down 50% in right ear    Kidney stone     6 or 7 yrs ago     Lumbar herniated disc     Migraine     Neck pain     Prediabetes     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HCC) 3/8/2019    Varicella     Wears glasses      Current Outpatient Medications   Medication Sig Dispense Refill    pantoprazole (PROTONIX) 40 mg tablet TAKE 1 TABLET BY MOUTH EVERY DAY BEFORE BREAKFAST 30 tablet 1    PREBIOTIC PRODUCT PO Take by mouth      Probiotic Product (PROBIOTIC DAILY PO) Take by mouth       No current facility-administered medications for this visit.        Allergies   Allergen Reactions    Albuterol      tachycardia    Bee Venom Anaphylaxis    Penicillins Anaphylaxis     As a toddler     Objective:     Exam limited by Video  Physical Exam:                                                                 Vitals:            Constitutional:    There were no vitals taken for this visit.  BP Readings from Last 3 Encounters:   06/30/23 130/86   06/12/23 159/79   05/30/23 132/84     Pulse Readings from Last 3 Encounters:   06/30/23 94   06/12/23 71   05/30/23 71         Well developed, well nourished, No dysmorphic features.       HEENT:  Normocephalic atraumatic. See neuro exam   Psychiatric:  Normal behavior and appropriate affect        Neurological Examination:     Mental status/cognitive function:   Recent and remote memory appear intact. Attention span and concentration as well as fund of knowledge appear appropriate for age. Normal language and spontaneous speech.  Cranial Nerves:  VII-facial expression symmetric  Motor Exam: symmetric bulk throughout. no atrophy, fasciculations or abnormal movements noted.   Coordination:  no apparent dysmetria, ataxia or tremor noted      Pertinent lab results:   See EMR for recent labs   11/24/2018:  CMP and CBC unremarkable  TSH 2.17     11/24/2018:       Imaging: I have personally reviewed imaging and radiology read   -MRI brain without contrast 9/23/2022: Minor white matter change within the frontal lobes suggestive of mild chronic  microangiopathy.  No acute ischemia per radiology -as of 7/29/2023 we discussed she does not have obvious empty sella or partially empty sella but it might be slightly flattened and she does have pallor of left greater than right optic nerve with some tortuosity of the left nerve and some posterior globe flattening on the right eye as well as a tight cervical medullary junction  - Carotid ultrasound 9/8/2022: normal     She reports she had a complete cardiac work-up recently as of 2/24/2023 that was unremarkable     - MRI Brain 3/11/13 - told was normal      Review of Systems:   ROS obtained by medical assistant and personally reviewed, but if any symptoms listed below say negative, does not mean patient has not had this symptom since last visit, please see HPI for details of symptoms discussed this visit. I recommended PCP follow up for non neurologic problems.    Review of Systems   Constitutional:  Negative for appetite change and fever.   HENT: Negative.  Negative for hearing loss, tinnitus, trouble swallowing and voice change.    Eyes: Negative.  Negative for photophobia and pain.   Respiratory: Negative.  Negative for shortness of breath.    Cardiovascular: Negative.  Negative for palpitations.   Gastrointestinal: Negative.  Negative for nausea and vomiting.   Endocrine: Negative.  Negative for cold intolerance.   Genitourinary: Negative.  Negative for dysuria, frequency and urgency.   Musculoskeletal: Negative.  Negative for myalgias and neck pain.   Skin: Negative.  Negative for rash.   Neurological:  Positive for headaches. Negative for dizziness, tremors, seizures, syncope, facial asymmetry, speech difficulty, weakness, light-headedness and numbness.   Hematological: Negative.  Does not bruise/bleed easily.   Psychiatric/Behavioral: Negative.  Negative for confusion, hallucinations and sleep disturbance.    All other systems reviewed and are negative.          I have spent 18 minutes with Patient today in  which greater than 50% of this time was spent in counseling/coordination of care regarding Prognosis, Risks and benefits of tx options, Instructions for management, Patient and family education, Importance of tx compliance, Risk factor reductions, Impressions, Counseling / Coordination of care, Documenting in the medical record, Reviewing / ordering tests, medicine, procedures  , Obtaining or reviewing history  , and Communicating with other healthcare professionals . I also spent 5 minutes non face to face for this patient the same day.     Visit Time  Total Visit Duration: 23

## 2024-02-26 ENCOUNTER — OFFICE VISIT (OUTPATIENT)
Dept: URGENT CARE | Facility: CLINIC | Age: 64
End: 2024-02-26
Payer: COMMERCIAL

## 2024-02-26 VITALS
DIASTOLIC BLOOD PRESSURE: 82 MMHG | HEART RATE: 95 BPM | SYSTOLIC BLOOD PRESSURE: 140 MMHG | OXYGEN SATURATION: 97 % | TEMPERATURE: 97.3 F | RESPIRATION RATE: 18 BRPM

## 2024-02-26 DIAGNOSIS — K21.00 GASTROESOPHAGEAL REFLUX DISEASE WITH ESOPHAGITIS, UNSPECIFIED WHETHER HEMORRHAGE: ICD-10-CM

## 2024-02-26 DIAGNOSIS — K21.9 GASTROESOPHAGEAL REFLUX DISEASE WITHOUT ESOPHAGITIS: ICD-10-CM

## 2024-02-26 DIAGNOSIS — J01.90 ACUTE VIRAL SINUSITIS: Primary | ICD-10-CM

## 2024-02-26 DIAGNOSIS — K22.2 ESOPHAGEAL STRICTURE: ICD-10-CM

## 2024-02-26 DIAGNOSIS — H69.91 DYSFUNCTION OF RIGHT EUSTACHIAN TUBE: ICD-10-CM

## 2024-02-26 DIAGNOSIS — B97.89 ACUTE VIRAL SINUSITIS: Primary | ICD-10-CM

## 2024-02-26 PROCEDURE — G0382 LEV 3 HOSP TYPE B ED VISIT: HCPCS | Performed by: STUDENT IN AN ORGANIZED HEALTH CARE EDUCATION/TRAINING PROGRAM

## 2024-02-26 RX ORDER — CETIRIZINE HYDROCHLORIDE 10 MG/1
10 TABLET ORAL DAILY
Qty: 10 TABLET | Refills: 0 | Status: SHIPPED | OUTPATIENT
Start: 2024-02-26

## 2024-02-26 RX ORDER — PANTOPRAZOLE SODIUM 40 MG/1
40 TABLET, DELAYED RELEASE ORAL
Qty: 30 TABLET | Refills: 1 | Status: SHIPPED | OUTPATIENT
Start: 2024-02-26

## 2024-02-26 RX ORDER — FLUTICASONE PROPIONATE 50 MCG
1 SPRAY, SUSPENSION (ML) NASAL 2 TIMES DAILY
Qty: 11.1 ML | Refills: 0 | Status: SHIPPED | OUTPATIENT
Start: 2024-02-26

## 2024-02-26 RX ORDER — GUAIFENESIN 200 MG/10ML
200 LIQUID ORAL 3 TIMES DAILY PRN
Qty: 120 ML | Refills: 0 | Status: SHIPPED | OUTPATIENT
Start: 2024-02-26

## 2024-02-26 NOTE — PROGRESS NOTES
[facility]        NAME: Phoebe Kamara is a 63 y.o. female  : 1960    MRN: [id number]  DATE: 2024  TIME: 4:39 PM    Assessment and Orders   Acute viral sinusitis [J01.90, B97.89]  1. Acute viral sinusitis  fluticasone (FLONASE) 50 mcg/act nasal spray      2. Dysfunction of right eustachian tube  fluticasone (FLONASE) 50 mcg/act nasal spray    cetirizine (ZyrTEC) 10 mg tablet    guaiFENesin (ROBITUSSIN) 100 MG/5ML oral liquid            Plan and Discussion      Symptoms and exam consistent with viral sinusitis. Will treat with Flonase, Zyrtec and Robitussin.  Discussed supportive care.       Discussed ED precautions including (but not limited to)  Difficultly breathing or shortness of breath  Chest pain  Acutely worsening symptoms.     Risks and benefits discussed. Patient understands and agrees with the plan.     Follow up with PCP.     Chief Complaint     Chief Complaint   Patient presents with    Cold Like Symptoms     Pt c/o right sided ear pain, sinus congestion, headache and stuffy nose for 3 days, mostly all on right side. Pt taking OTC: theraflu and sudafed.  Pt declined any testing.          History of Present Illness       Earache   There is pain in the right ear. This is a new problem. The current episode started in the past 7 days. The problem occurs every few minutes. The problem has been waxing and waning. There has been no fever. The pain is at a severity of 6/10. Associated symptoms include headaches and neck pain. Pertinent negatives include no abdominal pain, coughing, diarrhea, ear discharge, hearing loss, rash, rhinorrhea, sore throat or vomiting.       Review of Systems   Review of Systems   HENT:  Positive for ear pain. Negative for ear discharge, hearing loss, rhinorrhea and sore throat.    Respiratory:  Negative for cough.    Gastrointestinal:  Negative for abdominal pain, diarrhea and vomiting.   Musculoskeletal:  Positive for neck pain.   Skin:  Negative for  rash.   Neurological:  Positive for headaches.         Current Medications       Current Outpatient Medications:     cetirizine (ZyrTEC) 10 mg tablet, Take 1 tablet (10 mg total) by mouth daily, Disp: 10 tablet, Rfl: 0    fluticasone (FLONASE) 50 mcg/act nasal spray, 1 spray into each nostril 2 (two) times a day, Disp: 11.1 mL, Rfl: 0    guaiFENesin (ROBITUSSIN) 100 MG/5ML oral liquid, Take 10 mL (200 mg total) by mouth 3 (three) times a day as needed for cough, Disp: 120 mL, Rfl: 0    pantoprazole (PROTONIX) 40 mg tablet, TAKE 1 TABLET BY MOUTH EVERY DAY BEFORE BREAKFAST, Disp: 30 tablet, Rfl: 1    PREBIOTIC PRODUCT PO, Take by mouth, Disp: , Rfl:     Probiotic Product (PROBIOTIC DAILY PO), Take by mouth, Disp: , Rfl:     Current Allergies     Allergies as of 02/26/2024 - Reviewed 02/26/2024   Allergen Reaction Noted    Albuterol  11/15/2018    Bee venom Anaphylaxis 05/07/2018    Penicillins Anaphylaxis 11/15/2018            The following portions of the patient's history were reviewed and updated as appropriate: allergies, current medications, past family history, past medical history, past social history, past surgical history and problem list.     Past Medical History:   Diagnosis Date    Abnormal Pap smear of cervix     Cancer (HCC) 12/2018    oral    Hearing loss     down 50% in right ear    Kidney stone     6 or 7 yrs ago    Lumbar herniated disc     Migraine     Neck pain     Prediabetes     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HCC) 3/8/2019    Varicella     Wears glasses        Past Surgical History:   Procedure Laterality Date    HYSTERECTOMY  2007    OOPHORECTOMY Bilateral 2007    NC CERVICAL LYMPHADEC MODIFIED RADICAL NECK DSJ Bilateral 3/8/2019    Procedure: SUPRAOMOHYOID NECK DISSECTION;  Surgeon: Flynn Munoz DO;  [facility];  Service: ENT    NC EXCISION LESION TONGUE W/O CLOSURE N/A 1/9/2019    Procedure: Partial glossectomy with FROZEN SECTION;  Surgeon: Flynn Munoz DO;   [facility];  Service: ENT    IN EXCISION LESION TONGUE W/O CLOSURE Right 3/8/2019    Procedure: LATERAL TONGUE SURGICAL SITE RE-EXCISION WITH FROZEN SECTION ;  Surgeon: Flynn Munoz DO;  [facility];  Service: ENT    TONSILLECTOMY      TUBAL LIGATION      WISDOM TOOTH EXTRACTION         Family History   Problem Relation Age of Onset    Hypertension Mother     Diabetes Father     Hypertension Father     Liver cancer Father     No Known Problems Paternal Aunt     No Known Problems Paternal Aunt     No Known Problems Maternal Grandmother     No Known Problems Paternal Grandmother     No Known Problems Daughter     Breast cancer Neg Hx     Endometrial cancer Neg Hx     Ovarian cancer Neg Hx          Medications have been verified.        Objective   /82   Pulse 95   Temp (!) 97.3 °F (36.3 °C)   Resp 18   SpO2 97%   No LMP recorded. Patient has had a hysterectomy.       Physical Exam     Physical Exam  Constitutional:       General: She is not in acute distress.     Appearance: She is not ill-appearing or toxic-appearing.   HENT:      Head: Normocephalic and atraumatic.      Right Ear: Tympanic membrane and external ear normal.      Left Ear: Tympanic membrane and external ear normal.      Nose: Congestion present.      Comments: Boggy nasal turbinates     Mouth/Throat:      Pharynx: Posterior oropharyngeal erythema present.      Comments: Cobblestone appearance in posterior pharynx  Cardiovascular:      Rate and Rhythm: Normal rate and regular rhythm.   Pulmonary:      Effort: Pulmonary effort is normal. No respiratory distress.      Breath sounds: No wheezing.   Neurological:      General: No focal deficit present.      Mental Status: She is alert and oriented to person, place, and time.   Psychiatric:         Mood and Affect: Mood normal.         Behavior: Behavior normal.         Thought Content: Thought content normal.         Judgment: Judgment normal.               Angle Scott  DO

## 2024-03-12 DIAGNOSIS — J01.00 ACUTE NON-RECURRENT MAXILLARY SINUSITIS: Primary | ICD-10-CM

## 2024-03-12 RX ORDER — AZITHROMYCIN 250 MG/1
TABLET, FILM COATED ORAL
Qty: 6 TABLET | Refills: 0 | Status: SHIPPED | OUTPATIENT
Start: 2024-03-12 | End: 2024-03-13 | Stop reason: ALTCHOICE

## 2024-03-13 DIAGNOSIS — J01.00 ACUTE NON-RECURRENT MAXILLARY SINUSITIS: Primary | ICD-10-CM

## 2024-03-13 RX ORDER — DOXYCYCLINE HYCLATE 100 MG/1
100 CAPSULE ORAL EVERY 12 HOURS SCHEDULED
Qty: 14 CAPSULE | Refills: 0 | Status: SHIPPED | OUTPATIENT
Start: 2024-03-13 | End: 2024-03-20

## 2024-04-23 DIAGNOSIS — K21.00 GASTROESOPHAGEAL REFLUX DISEASE WITH ESOPHAGITIS, UNSPECIFIED WHETHER HEMORRHAGE: ICD-10-CM

## 2024-04-23 DIAGNOSIS — K21.9 GASTROESOPHAGEAL REFLUX DISEASE WITHOUT ESOPHAGITIS: ICD-10-CM

## 2024-04-23 DIAGNOSIS — K22.2 ESOPHAGEAL STRICTURE: ICD-10-CM

## 2024-04-23 RX ORDER — PANTOPRAZOLE SODIUM 40 MG/1
40 TABLET, DELAYED RELEASE ORAL
Qty: 30 TABLET | Refills: 5 | Status: SHIPPED | OUTPATIENT
Start: 2024-04-23

## 2024-06-24 DIAGNOSIS — K04.7 DENTAL INFECTION: Primary | ICD-10-CM

## 2024-06-24 RX ORDER — DOXYCYCLINE HYCLATE 100 MG/1
100 CAPSULE ORAL EVERY 12 HOURS SCHEDULED
Qty: 14 CAPSULE | Refills: 0 | Status: SHIPPED | OUTPATIENT
Start: 2024-06-24 | End: 2024-07-01

## 2024-06-25 DIAGNOSIS — Z12.31 ENCOUNTER FOR SCREENING MAMMOGRAM FOR MALIGNANT NEOPLASM OF BREAST: Primary | ICD-10-CM

## 2024-07-12 ENCOUNTER — APPOINTMENT (OUTPATIENT)
Dept: RADIOLOGY | Facility: CLINIC | Age: 64
End: 2024-07-12

## 2024-07-12 ENCOUNTER — OFFICE VISIT (OUTPATIENT)
Dept: FAMILY MEDICINE CLINIC | Facility: CLINIC | Age: 64
End: 2024-07-12

## 2024-07-12 VITALS
OXYGEN SATURATION: 97 % | SYSTOLIC BLOOD PRESSURE: 144 MMHG | HEART RATE: 94 BPM | DIASTOLIC BLOOD PRESSURE: 86 MMHG | TEMPERATURE: 97.8 F | WEIGHT: 177.4 LBS | BODY MASS INDEX: 31.43 KG/M2 | HEIGHT: 63 IN

## 2024-07-12 DIAGNOSIS — M94.0 COSTOCHONDRITIS, ACUTE: ICD-10-CM

## 2024-07-12 DIAGNOSIS — R07.89 CHEST WALL PAIN: ICD-10-CM

## 2024-07-12 DIAGNOSIS — Z12.11 SCREEN FOR COLON CANCER: ICD-10-CM

## 2024-07-12 DIAGNOSIS — I10 HTN (HYPERTENSION), BENIGN: Primary | ICD-10-CM

## 2024-07-12 DIAGNOSIS — Z13.220 SCREENING FOR LIPID DISORDERS: ICD-10-CM

## 2024-07-12 DIAGNOSIS — H92.01 RIGHT EAR PAIN: ICD-10-CM

## 2024-07-12 DIAGNOSIS — G62.9 NEUROPATHY: ICD-10-CM

## 2024-07-12 DIAGNOSIS — M25.50 ARTHRALGIA, UNSPECIFIED JOINT: ICD-10-CM

## 2024-07-12 PROBLEM — R35.0 URINARY FREQUENCY: Status: RESOLVED | Noted: 2020-12-31 | Resolved: 2024-07-12

## 2024-07-12 PROBLEM — R10.84 GENERALIZED ABDOMINAL PAIN: Status: RESOLVED | Noted: 2023-05-30 | Resolved: 2024-07-12

## 2024-07-12 PROBLEM — Z01.419 NORMAL GYNECOLOGIC EXAMINATION: Status: RESOLVED | Noted: 2019-10-03 | Resolved: 2024-07-12

## 2024-07-12 PROBLEM — R51.9 FREQUENT HEADACHES: Status: RESOLVED | Noted: 2022-08-19 | Resolved: 2024-07-12

## 2024-07-12 PROCEDURE — 99214 OFFICE O/P EST MOD 30 MIN: CPT | Performed by: NURSE PRACTITIONER

## 2024-07-12 PROCEDURE — 93000 ELECTROCARDIOGRAM COMPLETE: CPT | Performed by: NURSE PRACTITIONER

## 2024-07-12 PROCEDURE — 71046 X-RAY EXAM CHEST 2 VIEWS: CPT

## 2024-07-12 RX ORDER — NAPROXEN SODIUM 220 MG
220 TABLET ORAL 2 TIMES DAILY WITH MEALS
Qty: 28 TABLET | Refills: 0 | Status: SHIPPED | OUTPATIENT
Start: 2024-07-12 | End: 2024-07-26

## 2024-07-12 NOTE — PROGRESS NOTES
Ambulatory Visit  Name: Phoebe Kamara      : 1960      MRN: [id number]  Encounter Provider: PHILIPPE Núñez  Encounter Date: 2024   Encounter department: [facility]    Assessment & Plan   1. HTN (hypertension), benign  Assessment & Plan:   EKG showing a NSR HR 80's.   Orders:  -     POCT ECG  2. Chest wall pain  -     XR chest pa & lateral; Future; Expected date: 2024  3. Costochondritis, acute  -     Sedimentation rate, automated; Future  -     naproxen sodium (ALEVE) 220 MG tablet; Take 1 tablet (220 mg total) by mouth 2 (two) times a day with meals for 14 days  4. Arthralgia, unspecified joint  -     Comprehensive metabolic panel; Future  -     CBC and differential; Future  -     Vitamin B12; Future  -     Sedimentation rate, automated; Future  -     Lyme Total AB W Reflex to IGM/IGG; Future  5. Right ear pain  6. Neuropathy  -     Comprehensive metabolic panel; Future  -     CBC and differential; Future  -     Vitamin B12; Future  -     Sedimentation rate, automated; Future  -     Lyme Total AB W Reflex to IGM/IGG; Future  -     TSH, 3rd generation with Free T4 reflex; Future  7. Screening for lipid disorders  -     Lipid panel; Future  8. Screen for colon cancer  -     Occult Blood, Fecal Immunochemical; Future      Depression Screening and Follow-up Plan: Patient was screened for depression during today's encounter. They screened negative with a PHQ-2 score of 0.        History of Present Illness     Patient here today and reports that she is having multiple symptoms. Patient reports that she is having a pin in the middle of her chest located in the middle of her chest and when she pushes on the areas she is feeling pain in the chest wall. When this first started was having pains in the chest on the sides and then having on her breast bone pain. Having tenderness in her sternum. She is also gained some weight and breasts not fitting in her bras and may be  related .   Patient also having little spots on her extremities that start burning without reason and lasts a few seconds and then stops comes and goes and happening every day or every other day no history in the family of MS  She is also having joint pains in her ankles knees and elbows are hurting and feeling like her knee gives out at times question for lyme?   She is also gained weight and concerns for diabetes   She is also having chronic right ear pain and feeling is related to her eustacean tubes       Review of Systems   Constitutional:  Positive for fatigue. Negative for activity change, appetite change, chills, diaphoresis, fever and unexpected weight change.   HENT:  Positive for ear pain. Negative for congestion, hearing loss, postnasal drip, sinus pressure, sinus pain, sneezing and sore throat.    Eyes:  Negative for pain, redness and visual disturbance.   Respiratory:  Negative for cough and shortness of breath.    Cardiovascular:  Positive for chest pain. Negative for leg swelling.   Gastrointestinal:  Negative for abdominal pain, diarrhea, nausea and vomiting.   Endocrine: Negative.    Genitourinary: Negative.    Musculoskeletal:  Positive for arthralgias and myalgias.   Skin: Negative.         Two bouts of cancer sores always on the right    Allergic/Immunologic: Negative.    Neurological:  Positive for headaches. Negative for dizziness and light-headedness.   Hematological: Negative.    Psychiatric/Behavioral:  Negative for behavioral problems and dysphoric mood.      Past Medical History:   Diagnosis Date   • Abnormal Pap smear of cervix    • Cancer (HCC) 12/2018    oral   • Hearing loss     down 50% in right ear   • Kidney stone     6 or 7 yrs ago   • Lumbar herniated disc    • Migraine    • Neck pain    • Prediabetes    • 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HCC) 3/8/2019   • Varicella    • Wears glasses      Past Surgical History:   Procedure Laterality Date   • HYSTERECTOMY  2007   •  OOPHORECTOMY Bilateral 2007   • [place] CERVICAL LYMPHADEC MODIFIED RADICAL NECK DSJ Bilateral 3/8/2019    Procedure: SUPRAOMOHYOID NECK DISSECTION;  Surgeon: Flynn Munoz DO;  [facility];  Service: ENT   • W[place] EXCISION LESION TONGUE W/O CLOSURE N/A 1/9/2019    Procedure: Partial glossectomy with FROZEN SECTION;  Surgeon: Flynn Munoz DO;  [facility];  Service: ENT   • [place] EXCISION LESION TONGUE W/O CLOSURE Right 3/8/2019    Procedure: LATERAL TONGUE SURGICAL SITE RE-EXCISION WITH FROZEN SECTION ;  Surgeon: Flynn Munoz DO;  [facility];  Service: ENT   • TONSILLECTOMY     • TUBAL LIGATION     • WISDOM TOOTH EXTRACTION       Family History   Problem Relation Age of Onset   • Hypertension Mother    • Diabetes Father    • Hypertension Father    • Liver cancer Father    • No Known Problems Paternal Aunt    • No Known Problems Paternal Aunt    • No Known Problems Maternal Grandmother    • No Known Problems Paternal Grandmother    • No Known Problems Daughter    • Breast cancer Neg Hx    • Endometrial cancer Neg Hx    • Ovarian cancer Neg Hx      Social History     Tobacco Use   • Smoking status: Never   • Smokeless tobacco: Never   Vaping Use   • Vaping status: Never Used   Substance and Sexual Activity   • Alcohol use: No   • Drug use: No   • Sexual activity: Yes     Partners: Male     Current Outpatient Medications on File Prior to Visit   Medication Sig   • cetirizine (ZyrTEC) 10 mg tablet Take 1 tablet (10 mg total) by mouth daily   • pantoprazole (PROTONIX) 40 mg tablet TAKE 1 TABLET BY MOUTH EVERY DAY BEFORE BREAKFAST   • PREBIOTIC PRODUCT PO Take by mouth   • [DISCONTINUED] fluticasone (FLONASE) 50 mcg/act nasal spray 1 spray into each nostril 2 (two) times a day (Patient not taking: Reported on 7/12/2024)   • [DISCONTINUED] guaiFENesin (ROBITUSSIN) 100 MG/5ML oral liquid Take 10 mL (200 mg total) by mouth 3 (three) times a day as needed for cough (Patient not taking: Reported on  "7/12/2024)   • [DISCONTINUED] Probiotic Product (PROBIOTIC DAILY PO) Take by mouth (Patient not taking: Reported on 7/12/2024)     Allergies   Allergen Reactions   • Albuterol      tachycardia   • Bee Venom Anaphylaxis   • Penicillins Anaphylaxis     As a toddler   • Azithromycin Palpitations       There is no immunization history on file for this patient.  Objective     /86 (BP Location: Left arm, Patient Position: Sitting)   Pulse 94   Temp 97.8 °F (36.6 °C) (Temporal)   Ht 5' 3\" (1.6 m)   Wt 80.5 kg (177 lb 6.4 oz)   SpO2 97%   BMI 31.42 kg/m²     Physical Exam  Constitutional:       General: She is not in acute distress.     Appearance: She is well-developed.   HENT:      Head: Normocephalic and atraumatic.   Eyes:      Pupils: Pupils are equal, round, and reactive to light.   Neck:      Thyroid: No thyromegaly.   Cardiovascular:      Rate and Rhythm: Normal rate and regular rhythm.      Pulses: no weak pulses.           Dorsalis pedis pulses are 2+ on the right side and 2+ on the left side.        Posterior tibial pulses are 2+ on the right side and 2+ on the left side.      Heart sounds: Normal heart sounds. No murmur heard.  Pulmonary:      Effort: Pulmonary effort is normal. No respiratory distress.      Breath sounds: Normal breath sounds. No wheezing.   Chest:      Chest wall: Tenderness present.       Abdominal:      General: Bowel sounds are normal.      Palpations: Abdomen is soft.   Musculoskeletal:         General: Normal range of motion.      Cervical back: Normal range of motion.   Feet:      Right foot:      Skin integrity: No ulcer, skin breakdown, erythema, warmth, callus or dry skin.      Left foot:      Skin integrity: No ulcer, skin breakdown, erythema, warmth, callus or dry skin.   Skin:     General: Skin is warm and dry.   Neurological:      Mental Status: She is alert and oriented to person, place, and time.   Psychiatric:         Mood and Affect: Mood normal.     Patient's " shoes and socks removed.    Right Foot/Ankle   Right Foot Inspection  Skin Exam: skin normal and skin intact. No dry skin, no warmth, no callus, no erythema, no maceration, no abnormal color, no pre-ulcer, no ulcer and no callus.     Toe Exam: ROM and strength within normal limits.     Sensory   Vibration: intact  Proprioception: intact  Monofilament testing: intact    Vascular  Capillary refills: < 3 seconds  The right DP pulse is 2+. The right PT pulse is 2+.     Left Foot/Ankle  Left Foot Inspection  Skin Exam: skin normal and skin intact. No dry skin, no warmth, no erythema, no maceration, normal color, no pre-ulcer, no ulcer and no callus.     Toe Exam: ROM and strength within normal limits.     Sensory   Vibration: intact  Proprioception: intact  Monofilament testing: intact    Vascular  Capillary refills: < 3 seconds  The left DP pulse is 2+. The left PT pulse is 2+.     Assign Risk Category  No deformity present  No loss of protective sensation  No weak pulses  Risk: 0     Administrative Statements

## 2024-07-16 ENCOUNTER — APPOINTMENT (OUTPATIENT)
Dept: LAB | Facility: CLINIC | Age: 64
End: 2024-07-16
Payer: COMMERCIAL

## 2024-07-16 DIAGNOSIS — M94.0 COSTOCHONDRITIS, ACUTE: ICD-10-CM

## 2024-07-16 DIAGNOSIS — M25.50 ARTHRALGIA, UNSPECIFIED JOINT: ICD-10-CM

## 2024-07-16 DIAGNOSIS — Z13.220 SCREENING FOR LIPID DISORDERS: ICD-10-CM

## 2024-07-16 DIAGNOSIS — G62.9 NEUROPATHY: ICD-10-CM

## 2024-07-16 LAB
ALBUMIN SERPL BCG-MCNC: 4.1 G/DL (ref 3.5–5)
ALP SERPL-CCNC: 59 U/L (ref 34–104)
ALT SERPL W P-5'-P-CCNC: 12 U/L (ref 7–52)
ANION GAP SERPL CALCULATED.3IONS-SCNC: 6 MMOL/L (ref 4–13)
AST SERPL W P-5'-P-CCNC: 18 U/L (ref 13–39)
B BURGDOR IGG+IGM SER QL IA: NEGATIVE
BASOPHILS # BLD AUTO: 0.04 THOUSANDS/ÂΜL (ref 0–0.1)
BASOPHILS NFR BLD AUTO: 1 % (ref 0–1)
BILIRUB SERPL-MCNC: 0.48 MG/DL (ref 0.2–1)
BUN SERPL-MCNC: 18 MG/DL (ref 5–25)
CALCIUM SERPL-MCNC: 8.7 MG/DL (ref 8.4–10.2)
CHLORIDE SERPL-SCNC: 109 MMOL/L (ref 96–108)
CHOLEST SERPL-MCNC: 214 MG/DL
CO2 SERPL-SCNC: 27 MMOL/L (ref 21–32)
CREAT SERPL-MCNC: 0.72 MG/DL (ref 0.6–1.3)
EOSINOPHIL # BLD AUTO: 0.1 THOUSAND/ÂΜL (ref 0–0.61)
EOSINOPHIL NFR BLD AUTO: 2 % (ref 0–6)
ERYTHROCYTE [DISTWIDTH] IN BLOOD BY AUTOMATED COUNT: 14.5 % (ref 11.6–15.1)
ERYTHROCYTE [SEDIMENTATION RATE] IN BLOOD: 27 MM/HOUR (ref 0–29)
GFR SERPL CREATININE-BSD FRML MDRD: 88 ML/MIN/1.73SQ M
GLUCOSE P FAST SERPL-MCNC: 98 MG/DL (ref 65–99)
HCT VFR BLD AUTO: 42.6 % (ref 34.8–46.1)
HDLC SERPL-MCNC: 57 MG/DL
HGB BLD-MCNC: 14.1 G/DL (ref 11.5–15.4)
IMM GRANULOCYTES # BLD AUTO: 0.01 THOUSAND/UL (ref 0–0.2)
IMM GRANULOCYTES NFR BLD AUTO: 0 % (ref 0–2)
LDLC SERPL CALC-MCNC: 143 MG/DL (ref 0–100)
LYMPHOCYTES # BLD AUTO: 1.97 THOUSANDS/ÂΜL (ref 0.6–4.47)
LYMPHOCYTES NFR BLD AUTO: 34 % (ref 14–44)
MCH RBC QN AUTO: 30.3 PG (ref 26.8–34.3)
MCHC RBC AUTO-ENTMCNC: 33.1 G/DL (ref 31.4–37.4)
MCV RBC AUTO: 91 FL (ref 82–98)
MONOCYTES # BLD AUTO: 0.36 THOUSAND/ÂΜL (ref 0.17–1.22)
MONOCYTES NFR BLD AUTO: 6 % (ref 4–12)
NEUTROPHILS # BLD AUTO: 3.25 THOUSANDS/ÂΜL (ref 1.85–7.62)
NEUTS SEG NFR BLD AUTO: 57 % (ref 43–75)
NONHDLC SERPL-MCNC: 157 MG/DL
NRBC BLD AUTO-RTO: 0 /100 WBCS
PLATELET # BLD AUTO: 279 THOUSANDS/UL (ref 149–390)
PMV BLD AUTO: 11.3 FL (ref 8.9–12.7)
POTASSIUM SERPL-SCNC: 4 MMOL/L (ref 3.5–5.3)
PROT SERPL-MCNC: 6.4 G/DL (ref 6.4–8.4)
RBC # BLD AUTO: 4.66 MILLION/UL (ref 3.81–5.12)
SODIUM SERPL-SCNC: 142 MMOL/L (ref 135–147)
TRIGL SERPL-MCNC: 68 MG/DL
TSH SERPL DL<=0.05 MIU/L-ACNC: 1.42 UIU/ML (ref 0.45–4.5)
VIT B12 SERPL-MCNC: 477 PG/ML (ref 180–914)
WBC # BLD AUTO: 5.73 THOUSAND/UL (ref 4.31–10.16)

## 2024-07-16 PROCEDURE — 85025 COMPLETE CBC W/AUTO DIFF WBC: CPT

## 2024-07-16 PROCEDURE — 80053 COMPREHEN METABOLIC PANEL: CPT

## 2024-07-16 PROCEDURE — 36415 COLL VENOUS BLD VENIPUNCTURE: CPT

## 2024-07-16 PROCEDURE — 85652 RBC SED RATE AUTOMATED: CPT

## 2024-07-16 PROCEDURE — 82607 VITAMIN B-12: CPT

## 2024-07-16 PROCEDURE — 84443 ASSAY THYROID STIM HORMONE: CPT

## 2024-07-16 PROCEDURE — 86618 LYME DISEASE ANTIBODY: CPT

## 2024-07-16 PROCEDURE — 80061 LIPID PANEL: CPT

## 2024-08-11 PROBLEM — Z12.11 SCREEN FOR COLON CANCER: Status: RESOLVED | Noted: 2024-07-12 | Resolved: 2024-08-11

## 2024-08-11 PROBLEM — Z13.220 SCREENING FOR LIPID DISORDERS: Status: RESOLVED | Noted: 2024-07-12 | Resolved: 2024-08-11

## 2024-08-19 DIAGNOSIS — K64.9 HEMORRHOIDS, UNSPECIFIED HEMORRHOID TYPE: Primary | ICD-10-CM

## 2024-08-19 RX ORDER — HYDROCORTISONE ACETATE 25 MG/1
25 SUPPOSITORY RECTAL 2 TIMES DAILY
Qty: 12 SUPPOSITORY | Refills: 0 | Status: SHIPPED | OUTPATIENT
Start: 2024-08-19

## 2024-09-13 ENCOUNTER — OFFICE VISIT (OUTPATIENT)
Dept: FAMILY MEDICINE CLINIC | Facility: CLINIC | Age: 64
End: 2024-09-13

## 2024-09-13 VITALS
WEIGHT: 172.2 LBS | SYSTOLIC BLOOD PRESSURE: 138 MMHG | HEIGHT: 63 IN | OXYGEN SATURATION: 98 % | DIASTOLIC BLOOD PRESSURE: 82 MMHG | HEART RATE: 92 BPM | TEMPERATURE: 97.9 F | BODY MASS INDEX: 30.51 KG/M2

## 2024-09-13 DIAGNOSIS — K21.00 GASTROESOPHAGEAL REFLUX DISEASE WITH ESOPHAGITIS, UNSPECIFIED WHETHER HEMORRHAGE: Primary | ICD-10-CM

## 2024-09-13 PROBLEM — R07.89 CHEST WALL PAIN: Status: RESOLVED | Noted: 2024-07-12 | Resolved: 2024-09-13

## 2024-09-13 PROBLEM — M94.0 COSTOCHONDRITIS, ACUTE: Status: RESOLVED | Noted: 2024-07-12 | Resolved: 2024-09-13

## 2024-09-13 PROBLEM — H92.01 RIGHT EAR PAIN: Status: RESOLVED | Noted: 2024-07-12 | Resolved: 2024-09-13

## 2024-09-13 PROCEDURE — 99213 OFFICE O/P EST LOW 20 MIN: CPT | Performed by: NURSE PRACTITIONER

## 2024-09-13 NOTE — PROGRESS NOTES
Ambulatory Visit  Name: Phoebe Kamara      : 1960      MRN: [id number]  Encounter Provider: PHILIPPE Núñez  Encounter Date: 2024   Encounter department: [facility]    Assessment & Plan  Gastroesophageal reflux disease with esophagitis, unspecified whether hemorrhage  Looking over her imaging feel may be related to her IBS and possible ulcer will check H pylori and stool for hemoccult     Orders:    H. pylori antigen, stool; Future       History of Present Illness     Patien there today and reports that sh ehas ongoing issues with her stomach and reports that she is is having IBS-d and reports that she is keeping track of what happens when no specific foods are triggering and is avoiding dairy getting constipated for two days and then has diarrhea and the pain is moving around and has occasional bloating and following before she eats takes a peppermint oil then eats BF and then PPI and align and is fine and as soon as her stomach is emptied and feeling like pain and like something like stomach eating itself and is thinking she may have an duodenal ulcer. Mylanta seems to help with her symptoms and taking gas-x after meals she has been having the symptoms for a while. She has noticed that her stress is contributing to her symptoms. She deals with her ex- who and triggers her anxiety.     Abdominal Pain  This is a recurrent problem. The current episode started 1 to 4 weeks ago. The onset quality is gradual. The problem occurs 2 to 4 times per day. The most recent episode lasted 1 hours. The problem has been waxing and waning. The pain is located in the epigastric region. The pain is at a severity of 5/10. The quality of the pain is burning, cramping and a sensation of fullness. The abdominal pain radiates to the LUQ and RUQ. Associated symptoms include belching, constipation, diarrhea and flatus. Pertinent negatives include no anorexia, arthralgias, dysuria, fever,  frequency, headaches, hematochezia, hematuria, melena, myalgias, nausea, vomiting or weight loss. Nothing aggravates the pain. The pain is relieved by Eating, palpation and passing flatus. Prior diagnostic workup includes GI consult.   GI Problem  The primary symptoms include abdominal pain and diarrhea. Primary symptoms do not include fever, weight loss, nausea, vomiting, melena, hematochezia, dysuria, myalgias or arthralgias.   The illness is also significant for constipation. The illness does not include anorexia.       History obtained from : patient  Review of Systems   Constitutional:  Negative for fever and weight loss.   Gastrointestinal:  Positive for abdominal pain, constipation, diarrhea and flatus. Negative for anorexia, hematochezia, melena, nausea and vomiting.   Genitourinary:  Negative for dysuria, frequency and hematuria.   Musculoskeletal:  Negative for arthralgias and myalgias.   Neurological:  Negative for headaches.   Psychiatric/Behavioral:  The patient is nervous/anxious.      Pertinent Medical History         Medical History Reviewed by provider this encounter:       Past Medical History   Past Medical History:   Diagnosis Date    Abnormal Pap smear of cervix     Cancer (HCC) 12/2018    oral    Hearing loss     down 50% in right ear    Kidney stone     6 or 7 yrs ago    Lumbar herniated disc     Migraine     Neck pain     Prediabetes     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HCC) 3/8/2019    Varicella     Wears glasses      Past Surgical History:   Procedure Laterality Date    HYSTERECTOMY  2007    OOPHORECTOMY Bilateral 2007    NC CERVICAL LYMPHADEC MODIFIED RADICAL NECK DSJ Bilateral 3/8/2019    Procedure: SUPRAOMOHYOID NECK DISSECTION;  Surgeon: Flynn Munoz DO;  [facility];  Service: ENT    NC EXCISION LESION TONGUE W/O CLOSURE N/A 1/9/2019    Procedure: Partial glossectomy with FROZEN SECTION;  Surgeon: Flynn Munoz DO;  [facility];  Service: ENT    NC  EXCISION LESION TONGUE W/O CLOSURE Right 3/8/2019    Procedure: LATERAL TONGUE SURGICAL SITE RE-EXCISION WITH FROZEN SECTION ;  Surgeon: Flynn Munoz DO;  [facility];  Service: ENT    TONSILLECTOMY      TUBAL LIGATION      WISDOM TOOTH EXTRACTION       Family History   Problem Relation Age of Onset    Hypertension Mother     Diabetes Father     Hypertension Father     Liver cancer Father     No Known Problems Paternal Aunt     No Known Problems Paternal Aunt     No Known Problems Maternal Grandmother     No Known Problems Paternal Grandmother     No Known Problems Daughter     Breast cancer Neg Hx     Endometrial cancer Neg Hx     Ovarian cancer Neg Hx      Current Outpatient Medications on File Prior to Visit   Medication Sig Dispense Refill    cetirizine (ZyrTEC) 10 mg tablet Take 1 tablet (10 mg total) by mouth daily 10 tablet 0    hydrocortisone (ANUSOL-HC) 25 mg suppository Insert 1 suppository (25 mg total) into the rectum 2 (two) times a day 12 suppository 0    pantoprazole (PROTONIX) 40 mg tablet TAKE 1 TABLET BY MOUTH EVERY DAY BEFORE BREAKFAST 30 tablet 5    PREBIOTIC PRODUCT PO Take by mouth      naproxen sodium (ALEVE) 220 MG tablet Take 1 tablet (220 mg total) by mouth 2 (two) times a day with meals for 14 days 28 tablet 0     No current facility-administered medications on file prior to visit.     Allergies   Allergen Reactions    Albuterol      tachycardia    Bee Venom Anaphylaxis    Penicillins Anaphylaxis     As a toddler    Azithromycin Palpitations      Current Outpatient Medications on File Prior to Visit   Medication Sig Dispense Refill    cetirizine (ZyrTEC) 10 mg tablet Take 1 tablet (10 mg total) by mouth daily 10 tablet 0    hydrocortisone (ANUSOL-HC) 25 mg suppository Insert 1 suppository (25 mg total) into the rectum 2 (two) times a day 12 suppository 0    pantoprazole (PROTONIX) 40 mg tablet TAKE 1 TABLET BY MOUTH EVERY DAY BEFORE BREAKFAST 30 tablet 5    PREBIOTIC PRODUCT  "PO Take by mouth      naproxen sodium (ALEVE) 220 MG tablet Take 1 tablet (220 mg total) by mouth 2 (two) times a day with meals for 14 days 28 tablet 0     No current facility-administered medications on file prior to visit.      Social History     Tobacco Use    Smoking status: Never    Smokeless tobacco: Never   Vaping Use    Vaping status: Never Used   Substance and Sexual Activity    Alcohol use: No    Drug use: No    Sexual activity: Yes     Partners: Male         Objective     /82 (BP Location: Left arm, Patient Position: Sitting)   Pulse 92   Temp 97.9 °F (36.6 °C) (Temporal)   Ht 5' 3\" (1.6 m)   Wt 78.1 kg (172 lb 3.2 oz)   SpO2 98%   BMI 30.50 kg/m²     Physical Exam  Constitutional:       General: She is not in acute distress.     Appearance: She is well-developed.   HENT:      Head: Normocephalic and atraumatic.   Eyes:      Pupils: Pupils are equal, round, and reactive to light.   Neck:      Thyroid: No thyromegaly.   Cardiovascular:      Rate and Rhythm: Normal rate and regular rhythm.      Heart sounds: Normal heart sounds. No murmur heard.  Pulmonary:      Effort: Pulmonary effort is normal. No respiratory distress.      Breath sounds: Normal breath sounds. No wheezing.   Abdominal:      General: Bowel sounds are normal.      Palpations: Abdomen is soft.   Musculoskeletal:         General: Normal range of motion.      Cervical back: Normal range of motion.   Skin:     General: Skin is warm and dry.   Neurological:      Mental Status: She is alert and oriented to person, place, and time.         "

## 2024-09-13 NOTE — ASSESSMENT & PLAN NOTE
Looking over her imaging feel may be related to her IBS and possible ulcer will check H pylori and stool for hemoccult     Orders:    H. pylori antigen, stool; Future

## 2024-10-19 DIAGNOSIS — K22.2 ESOPHAGEAL STRICTURE: ICD-10-CM

## 2024-10-19 DIAGNOSIS — K21.9 GASTROESOPHAGEAL REFLUX DISEASE WITHOUT ESOPHAGITIS: ICD-10-CM

## 2024-10-19 DIAGNOSIS — K21.00 GASTROESOPHAGEAL REFLUX DISEASE WITH ESOPHAGITIS, UNSPECIFIED WHETHER HEMORRHAGE: ICD-10-CM

## 2024-10-20 RX ORDER — PANTOPRAZOLE SODIUM 40 MG/1
40 TABLET, DELAYED RELEASE ORAL
Qty: 30 TABLET | Refills: 5 | Status: SHIPPED | OUTPATIENT
Start: 2024-10-20

## 2024-10-29 ENCOUNTER — OFFICE VISIT (OUTPATIENT)
Dept: GASTROENTEROLOGY | Facility: CLINIC | Age: 64
End: 2024-10-29

## 2024-10-29 VITALS
HEART RATE: 73 BPM | SYSTOLIC BLOOD PRESSURE: 116 MMHG | OXYGEN SATURATION: 97 % | HEIGHT: 63 IN | TEMPERATURE: 97.5 F | DIASTOLIC BLOOD PRESSURE: 81 MMHG | BODY MASS INDEX: 28.07 KG/M2 | WEIGHT: 158.4 LBS

## 2024-10-29 DIAGNOSIS — K62.5 RECTAL BLEEDING: Primary | ICD-10-CM

## 2024-10-29 DIAGNOSIS — Z12.11 SCREENING FOR COLON CANCER: ICD-10-CM

## 2024-10-29 PROCEDURE — 99214 OFFICE O/P EST MOD 30 MIN: CPT | Performed by: INTERNAL MEDICINE

## 2024-10-29 NOTE — PATIENT INSTRUCTIONS
Scheduled date of colonoscopy (as of today):12/30/24  Physician performing colonoscopy:Raman  Location of colonoscopy:[facility]  Bowel prep reviewed with patient:Anil/Miralax  Instructions reviewed with patient by:King cleveland  Clearances:  none

## 2024-10-30 NOTE — PROGRESS NOTES
[facility] - Outpatient Follow-up Note  Phoebe Kamara 64 y.o. female MRN: [id number]  Encounter: [id number]          ASSESSMENT AND PLAN:      1. Rectal bleeding  - Colonoscopy; Future    2. Screening for colon cancer  - Colonoscopy; Future    ______________________________________________________________________    SUBJECTIVE: Comes the office today stating that she was seen in the emergency room at [facility] on 9/14/2024 for an episode of hematochezia.  She underwent CT scan of the abdomen pelvis which I reviewed that demonstrated possible sigmoid colitis in addition to diverticulosis.  The patient denies a family history for esophageal, stomach, colon cancer.  She was not hospitalized but rather she was sent home since her hemoglobin level was stable.  She denies heartburn, abdominal pain, nausea, vomiting, diarrhea, constipation, bloating, gaseousness.  She further denies heartburn, dysphagia, odynophagia, melena, any additional episodes of bright red rectal bleeding.  We had her scheduled for a colonoscopy on September 26, 2022 and this was canceled.  We subsequently had her scheduled for colonoscopy on 6/30/2023 and this was subsequently canceled.  There is no family history for inflammatory bowel disease.  The patient has no prior history of rectal bleeding or colitis.  She currently denies any problems.        Historical Information   Past Medical History:   Diagnosis Date    Abnormal Pap smear of cervix     Cancer (HCC) 12/2018    oral    Hearing loss     down 50% in right ear    Kidney stone     6 or 7 yrs ago    Lumbar herniated disc     Migraine     Neck pain     Prediabetes     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HCC) 3/8/2019    Varicella     Wears glasses      Past Surgical History:   Procedure Laterality Date    HYSTERECTOMY  2007    OOPHORECTOMY Bilateral 2007    KY CERVICAL LYMPHADEC MODIFIED RADICAL NECK DSJ Bilateral 3/8/2019    Procedure: SUPRAOMOHYOID NECK  "DISSECTION;  Surgeon: Flynn Munoz DO;  Location: [facility];  Service: ENT    LA EXCISION LESION TONGUE W/O CLOSURE N/A 1/9/2019    Procedure: Partial glossectomy with FROZEN SECTION;  Surgeon: Flynn Munoz DO;  Location: [facility];  Service: ENT    LA EXCISION LESION TONGUE W/O CLOSURE Right 3/8/2019    Procedure: LATERAL TONGUE SURGICAL SITE RE-EXCISION WITH FROZEN SECTION ;  Surgeon: Flynn Munoz DO;  Location: [facility];  Service: ENT    TONSILLECTOMY      TUBAL LIGATION      WISDOM TOOTH EXTRACTION       Social History   Social History     Substance and Sexual Activity   Alcohol Use No     Social History     Substance and Sexual Activity   Drug Use No     Social History     Tobacco Use   Smoking Status Never   Smokeless Tobacco Never     Family History   Problem Relation Age of Onset    Hypertension Mother     Diabetes Father     Hypertension Father     Liver cancer Father     No Known Problems Paternal Aunt     No Known Problems Paternal Aunt     No Known Problems Maternal Grandmother     No Known Problems Paternal Grandmother     No Known Problems Daughter     Breast cancer Neg Hx     Endometrial cancer Neg Hx     Ovarian cancer Neg Hx        Meds/Allergies       Current Outpatient Medications:     cetirizine (ZyrTEC) 10 mg tablet    hydrocortisone (ANUSOL-HC) 25 mg suppository    naproxen sodium (ALEVE) 220 MG tablet    pantoprazole (PROTONIX) 40 mg tablet    PREBIOTIC PRODUCT PO    Allergies   Allergen Reactions    Albuterol      tachycardia    Bee Venom Anaphylaxis    Penicillins Anaphylaxis     As a toddler    Azithromycin Palpitations           Objective     Blood pressure 116/81, pulse 73, temperature 97.5 °F (36.4 °C), temperature source Temporal, height 5' 3\" (1.6 m), weight 71.8 kg (158 lb 6.4 oz), SpO2 97%, not currently breastfeeding. Body mass index is 28.06 kg/m².      PHYSICAL EXAM:      General Appearance:   Alert, cooperative, no distress   HEENT:   Normocephalic, atraumatic, " anicteric.     Neck:  Supple, symmetrical, trachea midline   Lungs:   Clear to auscultation bilaterally; no rales, rhonchi or wheezing; respirations unlabored    Heart::   Regular rate and rhythm; no murmur, rub, or gallop.   Abdomen:   Soft, non-tender, non-distended; normal bowel sounds; no masses, no organomegaly    Genitalia:   Deferred    Rectal:   Deferred    Extremities:  No cyanosis, clubbing or edema    Pulses:  2+ and symmetric    Skin:  No jaundice, rashes, or lesions    Lymph nodes:  No palpable cervical lymphadenopathy        Lab Results:   No visits with results within 1 Day(s) from this visit.   Latest known visit with results is:   Appointment on 07/16/2024   Component Date Value    Sodium 07/16/2024 142     Potassium 07/16/2024 4.0     Chloride 07/16/2024 109 (H)     CO2 07/16/2024 27     ANION GAP 07/16/2024 6     BUN 07/16/2024 18     Creatinine 07/16/2024 0.72     Glucose, Fasting 07/16/2024 98     Calcium 07/16/2024 8.7     AST 07/16/2024 18     ALT 07/16/2024 12     Alkaline Phosphatase 07/16/2024 59     Total Protein 07/16/2024 6.4     Albumin 07/16/2024 4.1     Total Bilirubin 07/16/2024 0.48     eGFR 07/16/2024 88     WBC 07/16/2024 5.73     RBC 07/16/2024 4.66     Hemoglobin 07/16/2024 14.1     Hematocrit 07/16/2024 42.6     MCV 07/16/2024 91     MCH 07/16/2024 30.3     MCHC 07/16/2024 33.1     RDW 07/16/2024 14.5     MPV 07/16/2024 11.3     Platelets 07/16/2024 279     nRBC 07/16/2024 0     Segmented % 07/16/2024 57     Immature Grans % 07/16/2024 0     Lymphocytes % 07/16/2024 34     Monocytes % 07/16/2024 6     Eosinophils Relative 07/16/2024 2     Basophils Relative 07/16/2024 1     Absolute Neutrophils 07/16/2024 3.25     Absolute Immature Grans 07/16/2024 0.01     Absolute Lymphocytes 07/16/2024 1.97     Absolute Monocytes 07/16/2024 0.36     Eosinophils Absolute 07/16/2024 0.10     Basophils Absolute 07/16/2024 0.04     Cholesterol 07/16/2024 214 (H)     Triglycerides 07/16/2024 68      HDL, Direct 07/16/2024 57     LDL Calculated 07/16/2024 143 (H)     Non-HDL-Chol (CHOL-HDL) 07/16/2024 157     Vitamin B-12 07/16/2024 477     Sed Rate 07/16/2024 27     TSH 3RD GENERATON 07/16/2024 1.415     Lyme Total Antibodies 07/16/2024 Negative          Radiology Results:   No results found.

## 2025-01-07 ENCOUNTER — TELEPHONE (OUTPATIENT)
Dept: GASTROENTEROLOGY | Facility: CLINIC | Age: 65
End: 2025-01-07

## 2025-05-30 ENCOUNTER — NURSE TRIAGE (OUTPATIENT)
Age: 65
End: 2025-05-30

## 2025-05-30 NOTE — TELEPHONE ENCOUNTER
"REASON FOR CONVERSATION: Chest Pain    SYMPTOMS: chest discomfort that ranges 4-7/10, intermittent, that \"branches out\", started about a week ago    OTHER HEALTH INFORMATION: Pt has h/o Costochondroitis, Irritable Bowel Syndrome and isn't sure if s/s are related to either or both.  Pt took Tylenol for migraine yesterday and states that her chest discomfort stopped; pt has been able to walk for 3 miles without issue, denies, sob, lightheadedness, sweating.  Admits to nausea but states that could be her IBS; pt has h/o hyperlipidemia and hypertension; paternal h/o cardiac issues    PROTOCOL DISPOSITION: See Within 3 Days in Office (overriding See Today in Office)    CARE ADVICE PROVIDED: Scheduled pt to see PHILIPPE Riley on Monday 6/2/25 at 2pm for further evaluation.  Advised pt if pain worsens over the weekend to go to ED.  Pt verbalized understanding.    PRACTICE FOLLOW-UP: None          Reason for Disposition   Patient wants to be seen    Answer Assessment - Initial Assessment Questions  1. LOCATION: \"Where does it hurt?\"        Chest pain that \"branches out\"      2. RADIATION: \"Does the pain go anywhere else?\" (e.g., into neck, jaw, arms, back)      Starts in center of chest can move to right or left breast side and under under armpit    3. ONSET: \"When did the chest pain begin?\" (Minutes, hours or days)       Started about a week ago    4. PATTERN: \"Does the pain come and go, or has it been constant since it started?\"  \"Does it get worse with exertion?\"       Intermittent    5. DURATION: \"How long does it last\" (e.g., seconds, minutes, hours)      Sometimes a whole day and then nothing    6. SEVERITY: \"How bad is the pain?\"  (e.g., Scale 1-10; mild, moderate, or severe)      4-7/10    7. CARDIAC RISK FACTORS: \"Do you have any history of heart problems or risk factors for heart disease?\" (e.g., angina, prior heart attack; diabetes, high blood pressure, high cholesterol, smoker, or strong family history of heart " "disease)      High cholesterol, hypertension, never smoked, dad had cardiac issues    8. PULMONARY RISK FACTORS: \"Do you have any history of lung disease?\"  (e.g., blood clots in lung, asthma, emphysema, birth control pills)  Denies        9. CAUSE: \"What do you think is causing the chest pain?\"      Unsure    10. OTHER SYMPTOMS: \"Do you have any other symptoms?\" (e.g., dizziness, nausea, vomiting, sweating, fever, difficulty breathing, cough)        Denies    Protocols used: Chest Pain-Adult-[place]    "

## 2025-05-30 NOTE — TELEPHONE ENCOUNTER
Regarding: Throat/Esophagus pain as per Appt Notes  ----- Message from Rebecca HANSEN sent at 5/30/2025  2:36 PM EDT -----  The following was sent Via [employer]:    Yony Riley:     I just made an appointment to see you on June 9. I think I have costochondritis again and I don't remember what I did about it last time.      Phoebe

## 2025-06-01 PROBLEM — M25.50 ARTHRALGIA: Status: RESOLVED | Noted: 2024-07-12 | Resolved: 2025-06-01

## 2025-06-01 PROBLEM — R53.82 CHRONIC FATIGUE: Status: RESOLVED | Noted: 2018-11-15 | Resolved: 2025-06-01

## 2025-06-01 PROBLEM — R68.2 MOUTH DRYNESS: Status: RESOLVED | Noted: 2018-11-15 | Resolved: 2025-06-01

## 2025-06-01 PROBLEM — R42 DIZZINESS: Status: RESOLVED | Noted: 2022-08-19 | Resolved: 2025-06-01

## 2025-06-01 PROBLEM — H04.123 DRY EYES: Status: RESOLVED | Noted: 2018-11-15 | Resolved: 2025-06-01

## 2025-06-01 PROBLEM — G62.9 NEUROPATHY: Status: RESOLVED | Noted: 2024-07-12 | Resolved: 2025-06-01

## 2025-06-02 ENCOUNTER — APPOINTMENT (OUTPATIENT)
Dept: RADIOLOGY | Facility: CLINIC | Age: 65
End: 2025-06-02
Payer: COMMERCIAL

## 2025-06-02 ENCOUNTER — OFFICE VISIT (OUTPATIENT)
Dept: FAMILY MEDICINE CLINIC | Facility: CLINIC | Age: 65
End: 2025-06-02

## 2025-06-02 VITALS
TEMPERATURE: 96.4 F | DIASTOLIC BLOOD PRESSURE: 86 MMHG | BODY MASS INDEX: 28.17 KG/M2 | HEART RATE: 64 BPM | OXYGEN SATURATION: 99 % | SYSTOLIC BLOOD PRESSURE: 118 MMHG | HEIGHT: 63 IN | WEIGHT: 159 LBS

## 2025-06-02 DIAGNOSIS — R30.0 BURNING WITH URINATION: ICD-10-CM

## 2025-06-02 DIAGNOSIS — K22.2 ESOPHAGEAL STRICTURE: ICD-10-CM

## 2025-06-02 DIAGNOSIS — K21.00 GASTROESOPHAGEAL REFLUX DISEASE WITH ESOPHAGITIS, UNSPECIFIED WHETHER HEMORRHAGE: ICD-10-CM

## 2025-06-02 DIAGNOSIS — R07.89 CHEST WALL PAIN: Primary | ICD-10-CM

## 2025-06-02 DIAGNOSIS — K21.9 GASTROESOPHAGEAL REFLUX DISEASE WITHOUT ESOPHAGITIS: ICD-10-CM

## 2025-06-02 DIAGNOSIS — R07.89 CHEST WALL PAIN: ICD-10-CM

## 2025-06-02 PROBLEM — I10 HTN (HYPERTENSION), BENIGN: Status: RESOLVED | Noted: 2024-07-12 | Resolved: 2025-06-02

## 2025-06-02 LAB
SL AMB  POCT GLUCOSE, UA: NEGATIVE
SL AMB LEUKOCYTE ESTERASE,UA: ABNORMAL
SL AMB POCT BILIRUBIN,UA: NEGATIVE
SL AMB POCT BLOOD,UA: NEGATIVE
SL AMB POCT KETONES,UA: NEGATIVE
SL AMB POCT NITRITE,UA: NEGATIVE
SL AMB POCT PH,UA: 6
SL AMB POCT SPECIFIC GRAVITY,UA: 1.01
SL AMB POCT URINE PROTEIN: NEGATIVE
SL AMB POCT UROBILINOGEN: ABNORMAL

## 2025-06-02 PROCEDURE — 87086 URINE CULTURE/COLONY COUNT: CPT | Performed by: NURSE PRACTITIONER

## 2025-06-02 PROCEDURE — 71046 X-RAY EXAM CHEST 2 VIEWS: CPT

## 2025-06-02 PROCEDURE — 81002 URINALYSIS NONAUTO W/O SCOPE: CPT | Performed by: NURSE PRACTITIONER

## 2025-06-02 PROCEDURE — 99213 OFFICE O/P EST LOW 20 MIN: CPT | Performed by: NURSE PRACTITIONER

## 2025-06-02 PROCEDURE — 93000 ELECTROCARDIOGRAM COMPLETE: CPT | Performed by: NURSE PRACTITIONER

## 2025-06-02 RX ORDER — PANTOPRAZOLE SODIUM 40 MG/1
40 TABLET, DELAYED RELEASE ORAL
Qty: 30 TABLET | Refills: 5 | Status: SHIPPED | OUTPATIENT
Start: 2025-06-02

## 2025-06-02 NOTE — ASSESSMENT & PLAN NOTE
Orders:    POCT ECG    XR chest pa and lateral; Future  IH EKG showing a NSR and currently not having any pain and also will update the Chest x-ray suspect that it is related to her esophagus and that she has been off the PPI and has history of gastritis and hiatal hernia

## 2025-06-02 NOTE — ASSESSMENT & PLAN NOTE
Will refill the Protonix 40 mg daily and will restart     Orders:    pantoprazole (PROTONIX) 40 mg tablet; Take 1 tablet (40 mg total) by mouth daily before breakfast

## 2025-06-02 NOTE — PROGRESS NOTES
Name: Phoebe Kamara      : 1960      MRN: [id number]  Encounter Provider: PHILIPPE Núñez  Encounter Date: 2025   Encounter department: [facility]  :  Assessment & Plan  Chest wall pain    Orders:    POCT ECG    XR chest pa and lateral; Future  IH EKG showing a NSR and currently not having any pain and also will update the Chest x-ray suspect that it is related to her esophagus and that she has been off the PPI and has history of gastritis and hiatal hernia   Burning with urination    Orders:    UA (URINE) with reflex to Scope; Future    POCT urine dip    Urine culture; Future  will check a urine for UTI  Gastroesophageal reflux disease with esophagitis, unspecified whether hemorrhage  Will refill the Protonix 40 mg daily and will restart     Orders:    pantoprazole (PROTONIX) 40 mg tablet; Take 1 tablet (40 mg total) by mouth daily before breakfast    Esophageal stricture  No current issues     Orders:    pantoprazole (PROTONIX) 40 mg tablet; Take 1 tablet (40 mg total) by mouth daily before breakfast    Gastroesophageal reflux disease without esophagitis    Orders:    pantoprazole (PROTONIX) 40 mg tablet; Take 1 tablet (40 mg total) by mouth daily before breakfast           History of Present Illness   Patient here today and reports that she was having a squeezing pains in the middle of her chest that would last a few seconds and would come and goes and was asking what to take and took tylenol and everything went away and then was able to walk in a parade and had no chest pains. Her pain is always symmetrical blood pressure and pulse are fine and not waking her from sleep no pains in the arms or heaviness in the chest no shortness of breath Patient in the past has had issues with her esophagus and has a hiatal hernia and having belching She is also having some burning with urination       Review of Systems   Constitutional:  Negative for activity change, appetite  "change, chills, diaphoresis, fatigue, fever and unexpected weight change.   HENT:  Negative for congestion, ear pain, hearing loss, postnasal drip, sinus pressure, sinus pain, sneezing and sore throat.    Eyes:  Negative for pain, redness and visual disturbance.   Respiratory:  Negative for cough and shortness of breath.    Cardiovascular:  Negative for chest pain and leg swelling.        Not currently having chest pain    Gastrointestinal:  Negative for abdominal pain, diarrhea, nausea and vomiting.        Dysuria   Endocrine: Negative.    Genitourinary:  Positive for dysuria.   Musculoskeletal:  Negative for arthralgias.   Skin: Negative.    Neurological:  Negative for dizziness and light-headedness.   Psychiatric/Behavioral:  Negative for behavioral problems and dysphoric mood.        Objective   /86 (BP Location: Left arm, Patient Position: Sitting, Cuff Size: Large)   Pulse 64   Temp (!) 96.4 °F (35.8 °C)   Ht 5' 3\" (1.6 m)   Wt 72.1 kg (159 lb)   SpO2 99%   BMI 28.17 kg/m²      Physical Exam  Constitutional:       General: She is not in acute distress.     Appearance: She is well-developed.   HENT:      Head: Normocephalic and atraumatic.     Eyes:      Pupils: Pupils are equal, round, and reactive to light.     Neck:      Thyroid: No thyromegaly.     Cardiovascular:      Rate and Rhythm: Normal rate and regular rhythm.      Heart sounds: Normal heart sounds. No murmur heard.  Pulmonary:      Effort: Pulmonary effort is normal. No respiratory distress.      Breath sounds: Normal breath sounds. No wheezing.   Abdominal:      General: Bowel sounds are normal.      Palpations: Abdomen is soft.     Musculoskeletal:         General: Normal range of motion.      Cervical back: Normal range of motion.     Skin:     General: Skin is warm and dry.     Neurological:      Mental Status: She is alert and oriented to person, place, and time.         "

## 2025-06-02 NOTE — ASSESSMENT & PLAN NOTE
No current issues     Orders:    pantoprazole (PROTONIX) 40 mg tablet; Take 1 tablet (40 mg total) by mouth daily before breakfast

## 2025-06-02 NOTE — ASSESSMENT & PLAN NOTE
Orders:    UA (URINE) with reflex to Scope; Future    POCT urine dip    Urine culture; Future  will check a urine for UTI

## 2025-06-03 ENCOUNTER — RESULTS FOLLOW-UP (OUTPATIENT)
Dept: FAMILY MEDICINE CLINIC | Facility: CLINIC | Age: 65
End: 2025-06-03

## 2025-06-04 LAB — BACTERIA UR CULT: NORMAL

## 2025-08-13 ENCOUNTER — HOSPITAL ENCOUNTER (OUTPATIENT)
Dept: MAMMOGRAPHY | Facility: CLINIC | Age: 65
Discharge: HOME | End: 2025-08-13
Attending: NURSE PRACTITIONER

## 2025-08-13 VITALS — WEIGHT: 159 LBS | BODY MASS INDEX: 28.17 KG/M2 | HEIGHT: 63 IN

## 2025-08-13 DIAGNOSIS — Z12.31 ENCOUNTER FOR SCREENING MAMMOGRAM FOR MALIGNANT NEOPLASM OF BREAST: ICD-10-CM

## 2025-08-13 PROCEDURE — 77063 BREAST TOMOSYNTHESIS BI: CPT

## 2025-08-13 PROCEDURE — 77067 SCR MAMMO BI INCL CAD: CPT

## 2025-08-14 ENCOUNTER — TELEPHONE (OUTPATIENT)
Facility: HOSPITAL | Age: 65
End: 2025-08-14

## (undated) DEVICE — PROXIMATE SKIN STAPLERS (35 WIDE) CONTAINS 35 STAINLESS STEEL STAPLES (FIXED HEAD): Brand: PROXIMATE

## (undated) DEVICE — TELFA NON-ADHERENT ABSORBENT DRESSING: Brand: TELFA

## (undated) DEVICE — 10FR FRAZIER SUCTION HANDLE: Brand: CARDINAL HEALTH

## (undated) DEVICE — STERILE BETHLEHEM T AND A PACK: Brand: CARDINAL HEALTH

## (undated) DEVICE — SUT MONOCRYL 4-0 PS-2 27 IN Y426H

## (undated) DEVICE — SURGICEL 2 X 14

## (undated) DEVICE — SKIN MARKER DUAL TIP WITH RULER CAP, FLEXIBLE RULER AND LABELS: Brand: DEVON

## (undated) DEVICE — DRAPE SHEET THREE QUARTER

## (undated) DEVICE — SPONGE CHERRY 1/2IN

## (undated) DEVICE — INTENDED FOR TISSUE SEPARATION, AND OTHER PROCEDURES THAT REQUIRE A SHARP SURGICAL BLADE TO PUNCTURE OR CUT.: Brand: BARD-PARKER SAFETY BLADES SIZE 15, STERILE

## (undated) DEVICE — SUT VICRYL 3-0 SH 27 IN J416H

## (undated) DEVICE — ADHESIVE SKN CLSR HISTOACRYL FLEX 0.5ML LF

## (undated) DEVICE — GLOVE INDICATOR PI UNDERGLOVE SZ 7.5 BLUE

## (undated) DEVICE — NEEDLE 25G X 1 1/2

## (undated) DEVICE — GLOVE PI ULTRA TOUCH SZ.7.0

## (undated) DEVICE — GLOVE SRG BIOGEL ORTHOPEDIC 7

## (undated) DEVICE — GLOVE SRG BIOGEL ECLIPSE 7

## (undated) DEVICE — TUBING SUCTION 5MM X 12 FT

## (undated) DEVICE — NEEDLE COUNTER LG W/RULER

## (undated) DEVICE — SUT VICRYL 4-0 PS-2 27 IN J426H

## (undated) DEVICE — ELECTRODE NEEDLE MOD E-Z CLEAN 2.75IN 7CM -0013M

## (undated) DEVICE — GLOVE PI ULTRA TOUCH SZ.6.5

## (undated) DEVICE — SUT SILK 2-0 SH 30 IN K833H

## (undated) DEVICE — SUT VICRYL 2-0 SH 27 IN UNDYED J417H

## (undated) DEVICE — HARMONIC FOCUS SHEARS 9CM LENGTH + ADAPTIVE TISSUE TECHNOLOGY FOR USE WITH BLUE HAND PIECE ONLY: Brand: HARMONIC FOCUS

## (undated) DEVICE — SYRINGE 10ML LL

## (undated) DEVICE — SPECIMEN CONTAINER STERILE PEEL PACK

## (undated) DEVICE — SPONGE STICK WITH PVP-I: Brand: KENDALL

## (undated) DEVICE — 2000CC GUARDIAN II: Brand: GUARDIAN

## (undated) DEVICE — NEEDLE 18 G X 1 1/2

## (undated) DEVICE — GLOVE INDICATOR PI UNDERGLOVE SZ 7 BLUE

## (undated) DEVICE — PENCIL ELECTROSURG E-Z CLEAN -0035H

## (undated) DEVICE — X-RAY DETECTABLE SPONGES,16 PLY: Brand: VISTEC

## (undated) DEVICE — SCD SEQUENTIAL COMPRESSION COMFORT SLEEVE MEDIUM KNEE LENGTH: Brand: KENDALL SCD

## (undated) DEVICE — BETHLEHEM UNIVERSAL MINOR GEN: Brand: CARDINAL HEALTH

## (undated) DEVICE — SUT SILK 3-0 30 IN A304H

## (undated) DEVICE — ASTOUND STANDARD SURGICAL GOWN, XXL: Brand: CONVERTORS

## (undated) DEVICE — GLOVE SRG BIOGEL ECLIPSE 7.5

## (undated) DEVICE — TIBURON SPLIT SHEET: Brand: CONVERTORS

## (undated) DEVICE — SUT SILK 2-0 30 IN A305H

## (undated) DEVICE — SUT VICRYL 4-0 SH 27 IN J415H

## (undated) DEVICE — ELECTRODE BLADE MOD E-Z CLEAN  2.75IN 7CM -0012AM

## (undated) DEVICE — GLOVE SRG BIOGEL 7.5